# Patient Record
Sex: FEMALE | Race: WHITE | NOT HISPANIC OR LATINO | Employment: UNEMPLOYED | URBAN - METROPOLITAN AREA
[De-identification: names, ages, dates, MRNs, and addresses within clinical notes are randomized per-mention and may not be internally consistent; named-entity substitution may affect disease eponyms.]

---

## 2023-04-24 LAB
ALBUMIN (G/DL) IN SER/PLAS: 4.3 G/DL (ref 3.4–5)
ANION GAP IN SER/PLAS: 17 MMOL/L (ref 10–20)
CALCIUM (MG/DL) IN SER/PLAS: 8.6 MG/DL (ref 8.6–10.3)
CARBON DIOXIDE, TOTAL (MMOL/L) IN SER/PLAS: 25 MMOL/L (ref 21–32)
CHLORIDE (MMOL/L) IN SER/PLAS: 102 MMOL/L (ref 98–107)
CREATININE (MG/DL) IN SER/PLAS: 1.31 MG/DL (ref 0.5–1.05)
ERYTHROCYTE DISTRIBUTION WIDTH (RATIO) BY AUTOMATED COUNT: 16 % (ref 11.5–14.5)
ERYTHROCYTE MEAN CORPUSCULAR HEMOGLOBIN CONCENTRATION (G/DL) BY AUTOMATED: 29 G/DL (ref 32–36)
ERYTHROCYTE MEAN CORPUSCULAR VOLUME (FL) BY AUTOMATED COUNT: 75 FL (ref 80–100)
ERYTHROCYTES (10*6/UL) IN BLOOD BY AUTOMATED COUNT: 4.64 X10E12/L (ref 4–5.2)
GFR FEMALE: 39 ML/MIN/1.73M2
GLUCOSE (MG/DL) IN SER/PLAS: 100 MG/DL (ref 74–99)
HEMATOCRIT (%) IN BLOOD BY AUTOMATED COUNT: 34.8 % (ref 36–46)
HEMOGLOBIN (G/DL) IN BLOOD: 10.1 G/DL (ref 12–16)
LEUKOCYTES (10*3/UL) IN BLOOD BY AUTOMATED COUNT: 8.1 X10E9/L (ref 4.4–11.3)
PHOSPHATE (MG/DL) IN SER/PLAS: 4.1 MG/DL (ref 2.5–4.9)
PLATELETS (10*3/UL) IN BLOOD AUTOMATED COUNT: 334 X10E9/L (ref 150–450)
POTASSIUM (MMOL/L) IN SER/PLAS: 3.4 MMOL/L (ref 3.5–5.3)
SODIUM (MMOL/L) IN SER/PLAS: 141 MMOL/L (ref 136–145)
UREA NITROGEN (MG/DL) IN SER/PLAS: 28 MG/DL (ref 6–23)

## 2023-09-02 PROBLEM — I10 BENIGN ESSENTIAL HTN: Status: ACTIVE | Noted: 2023-09-02

## 2023-09-02 PROBLEM — I44.2 COMPLETE HEART BLOCK (MULTI): Status: ACTIVE | Noted: 2023-09-02

## 2023-09-02 PROBLEM — D50.9 IRON DEFICIENCY ANEMIA: Status: ACTIVE | Noted: 2023-09-02

## 2023-09-02 PROBLEM — J44.9 COPD (CHRONIC OBSTRUCTIVE PULMONARY DISEASE) (MULTI): Status: ACTIVE | Noted: 2023-09-02

## 2023-09-02 PROBLEM — K52.9 ACUTE COLITIS: Status: ACTIVE | Noted: 2023-09-02

## 2023-09-02 PROBLEM — R91.1 LUNG NODULE: Status: ACTIVE | Noted: 2023-09-02

## 2023-09-02 PROBLEM — N18.2 CHRONIC RENAL IMPAIRMENT, STAGE 2 (MILD): Status: ACTIVE | Noted: 2023-09-02

## 2023-09-02 PROBLEM — R07.9 CARDIAC CHEST PAIN: Status: ACTIVE | Noted: 2023-09-02

## 2023-09-02 PROBLEM — K92.2 GI (GASTROINTESTINAL BLEED): Status: ACTIVE | Noted: 2023-02-10

## 2023-09-02 PROBLEM — I50.22 CHRONIC SYSTOLIC CONGESTIVE HEART FAILURE (MULTI): Status: ACTIVE | Noted: 2023-09-02

## 2023-09-02 PROBLEM — R76.8 RED BLOOD CELL ANTIBODY POSITIVE: Chronic | Status: ACTIVE | Noted: 2023-02-21

## 2023-09-02 PROBLEM — I21.4 NON-ST ELEVATION MYOCARDIAL INFARCTION (NSTEMI), SUBSEQUENT EPISODE OF CARE (MULTI): Status: ACTIVE | Noted: 2023-09-02

## 2023-09-02 PROBLEM — F51.01 PRIMARY INSOMNIA: Status: ACTIVE | Noted: 2023-09-02

## 2023-09-02 PROBLEM — D37.8 NEOPLASM OF UNCERTAIN BEHAVIOR OF PANCREAS: Status: ACTIVE | Noted: 2023-09-02

## 2023-09-02 PROBLEM — I25.10 3-VESSEL CORONARY ARTERY DISEASE: Status: ACTIVE | Noted: 2023-09-02

## 2023-09-02 PROBLEM — I25.10 CORONARY ARTERY DISEASE INVOLVING NATIVE CORONARY ARTERY OF NATIVE HEART WITHOUT ANGINA PECTORIS: Status: ACTIVE | Noted: 2023-02-19

## 2023-09-02 PROBLEM — Z95.828 HISTORY OF ENDOVASCULAR STENT GRAFT FOR ABDOMINAL AORTIC ANEURYSM (AAA): Status: ACTIVE | Noted: 2023-09-02

## 2023-09-02 PROBLEM — Z90.410 ACQUIRED TOTAL ABSENCE OF PANCREAS: Status: ACTIVE | Noted: 2023-02-19

## 2023-09-02 PROBLEM — E11.9 DIABETES MELLITUS TYPE 2, NONINSULIN DEPENDENT (MULTI): Status: ACTIVE | Noted: 2023-09-02

## 2023-09-02 PROBLEM — I10 HTN (HYPERTENSION): Status: ACTIVE | Noted: 2023-09-02

## 2023-09-02 PROBLEM — I71.40 ABDOMINAL AORTIC ANEURYSM (AAA) (CMS-HCC): Status: ACTIVE | Noted: 2023-09-02

## 2023-09-02 PROBLEM — E78.00 HYPERCHOLESTEROLEMIA: Status: ACTIVE | Noted: 2023-09-02

## 2023-09-02 PROBLEM — K21.9 GERD WITHOUT ESOPHAGITIS: Status: ACTIVE | Noted: 2023-09-02

## 2023-09-02 PROBLEM — R63.4 WEIGHT LOSS: Status: ACTIVE | Noted: 2022-05-10

## 2023-09-02 RX ORDER — OMEPRAZOLE 40 MG/1
1 CAPSULE, DELAYED RELEASE ORAL
COMMUNITY
Start: 2021-10-24

## 2023-09-02 RX ORDER — AMLODIPINE BESYLATE 10 MG/1
1 TABLET ORAL DAILY
COMMUNITY
End: 2023-10-10 | Stop reason: ALTCHOICE

## 2023-09-02 RX ORDER — FAMOTIDINE 20 MG/1
1 TABLET, FILM COATED ORAL NIGHTLY
COMMUNITY

## 2023-09-02 RX ORDER — BUDESONIDE 0.5 MG/2ML
2 INHALANT ORAL 2 TIMES DAILY
COMMUNITY
Start: 2020-01-02 | End: 2023-10-10 | Stop reason: ALTCHOICE

## 2023-09-02 RX ORDER — FUROSEMIDE 40 MG/1
1 TABLET ORAL DAILY
COMMUNITY
End: 2023-11-22 | Stop reason: SDUPTHER

## 2023-09-02 RX ORDER — METFORMIN HYDROCHLORIDE 500 MG/1
1 TABLET ORAL EVERY 12 HOURS
COMMUNITY
Start: 2021-06-09

## 2023-09-02 RX ORDER — FLUTICASONE PROPIONATE 110 UG/1
2 AEROSOL, METERED RESPIRATORY (INHALATION) 2 TIMES DAILY
COMMUNITY
Start: 2020-01-10 | End: 2023-10-10 | Stop reason: ALTCHOICE

## 2023-09-02 RX ORDER — NITROGLYCERIN 0.4 MG/1
1 TABLET SUBLINGUAL EVERY 5 MIN PRN
COMMUNITY
Start: 2018-09-17

## 2023-09-02 RX ORDER — TRAZODONE HYDROCHLORIDE 50 MG/1
1 TABLET ORAL NIGHTLY
COMMUNITY
End: 2023-10-10 | Stop reason: ALTCHOICE

## 2023-09-02 RX ORDER — DOCUSATE SODIUM 100 MG/1
1 CAPSULE, LIQUID FILLED ORAL 2 TIMES DAILY
COMMUNITY
End: 2023-10-10 | Stop reason: ALTCHOICE

## 2023-09-02 RX ORDER — METOPROLOL TARTRATE 25 MG/1
1 TABLET, FILM COATED ORAL 2 TIMES DAILY
COMMUNITY
End: 2023-11-22

## 2023-09-02 RX ORDER — POTASSIUM CHLORIDE 750 MG/1
1 TABLET, FILM COATED, EXTENDED RELEASE ORAL DAILY
COMMUNITY
Start: 2021-11-26 | End: 2023-10-10 | Stop reason: ALTCHOICE

## 2023-09-02 RX ORDER — METOPROLOL TARTRATE 50 MG/1
50 TABLET ORAL 2 TIMES DAILY
COMMUNITY
Start: 2019-12-24 | End: 2023-10-10 | Stop reason: ALTCHOICE

## 2023-09-02 RX ORDER — SIMVASTATIN 5 MG/1
1 TABLET, FILM COATED ORAL NIGHTLY
COMMUNITY
Start: 2018-03-01 | End: 2023-10-10 | Stop reason: ALTCHOICE

## 2023-09-02 RX ORDER — ASPIRIN 81 MG/1
81 TABLET ORAL
COMMUNITY
End: 2023-10-10 | Stop reason: ALTCHOICE

## 2023-09-02 RX ORDER — PANTOPRAZOLE SODIUM 40 MG/1
1 TABLET, DELAYED RELEASE ORAL DAILY
COMMUNITY
End: 2023-10-10 | Stop reason: ALTCHOICE

## 2023-09-02 RX ORDER — ACETAMINOPHEN 500 MG
2 TABLET ORAL EVERY 6 HOURS PRN
COMMUNITY
Start: 2019-12-20 | End: 2023-10-10 | Stop reason: ALTCHOICE

## 2023-09-02 RX ORDER — EZETIMIBE 10 MG/1
1 TABLET ORAL DAILY
COMMUNITY
Start: 2022-07-19 | End: 2023-10-10 | Stop reason: ALTCHOICE

## 2023-09-02 RX ORDER — IRON POLYSACCHARIDE COMPLEX 150 MG
150 CAPSULE ORAL
COMMUNITY
End: 2023-10-10 | Stop reason: ALTCHOICE

## 2023-09-02 RX ORDER — SUCRALFATE 1 G/1
1 TABLET ORAL EVERY 6 HOURS
COMMUNITY
Start: 2022-07-19 | End: 2023-10-10 | Stop reason: ALTCHOICE

## 2023-09-02 RX ORDER — LISINOPRIL 2.5 MG/1
1 TABLET ORAL DAILY
COMMUNITY
Start: 2019-11-24 | End: 2023-11-22 | Stop reason: SDUPTHER

## 2023-09-02 RX ORDER — ACETAMINOPHEN 500 MG
1 TABLET ORAL NIGHTLY
COMMUNITY
End: 2023-10-10 | Stop reason: ALTCHOICE

## 2023-09-02 RX ORDER — GLIMEPIRIDE 1 MG/1
1 TABLET ORAL
COMMUNITY
Start: 2021-03-03 | End: 2023-10-10 | Stop reason: ALTCHOICE

## 2023-09-02 RX ORDER — HYDROXYZINE HYDROCHLORIDE 10 MG/1
1 TABLET, FILM COATED ORAL NIGHTLY
COMMUNITY
Start: 2021-11-09 | End: 2023-10-10 | Stop reason: ALTCHOICE

## 2023-09-02 RX ORDER — IPRATROPIUM BROMIDE 0.5 MG/2.5ML
2.5 SOLUTION RESPIRATORY (INHALATION) 4 TIMES DAILY
COMMUNITY
End: 2023-10-10 | Stop reason: ALTCHOICE

## 2023-09-02 RX ORDER — FERROUS SULFATE 15 MG/ML
15 DROPS ORAL 2 TIMES DAILY
COMMUNITY
Start: 2019-12-24 | End: 2023-10-10 | Stop reason: ALTCHOICE

## 2023-09-02 RX ORDER — SUCRALFATE 1 G/1
1 TABLET ORAL 4 TIMES DAILY
COMMUNITY
Start: 2023-05-10

## 2023-09-02 RX ORDER — TALC
1 POWDER (GRAM) TOPICAL NIGHTLY
COMMUNITY
End: 2023-10-10 | Stop reason: ALTCHOICE

## 2023-10-10 ENCOUNTER — ANCILLARY PROCEDURE (OUTPATIENT)
Dept: CARDIOLOGY | Facility: CLINIC | Age: 88
End: 2023-10-10
Payer: COMMERCIAL

## 2023-10-10 ENCOUNTER — OFFICE VISIT (OUTPATIENT)
Dept: CARDIOLOGY | Facility: CLINIC | Age: 88
End: 2023-10-10
Payer: COMMERCIAL

## 2023-10-10 VITALS
OXYGEN SATURATION: 96 % | WEIGHT: 95 LBS | BODY MASS INDEX: 17.94 KG/M2 | HEART RATE: 69 BPM | HEIGHT: 61 IN | DIASTOLIC BLOOD PRESSURE: 71 MMHG | SYSTOLIC BLOOD PRESSURE: 119 MMHG

## 2023-10-10 DIAGNOSIS — I10 PRIMARY HYPERTENSION: ICD-10-CM

## 2023-10-10 DIAGNOSIS — E78.00 HYPERCHOLESTEROLEMIA: ICD-10-CM

## 2023-10-10 DIAGNOSIS — I50.22 CHRONIC SYSTOLIC CONGESTIVE HEART FAILURE (MULTI): ICD-10-CM

## 2023-10-10 DIAGNOSIS — I25.10 CORONARY ARTERY DISEASE INVOLVING NATIVE CORONARY ARTERY OF NATIVE HEART WITHOUT ANGINA PECTORIS: ICD-10-CM

## 2023-10-10 DIAGNOSIS — I44.2 COMPLETE HEART BLOCK (MULTI): ICD-10-CM

## 2023-10-10 DIAGNOSIS — I10 BENIGN ESSENTIAL HTN: ICD-10-CM

## 2023-10-10 DIAGNOSIS — D50.0 IRON DEFICIENCY ANEMIA DUE TO CHRONIC BLOOD LOSS: ICD-10-CM

## 2023-10-10 DIAGNOSIS — I25.10 3-VESSEL CORONARY ARTERY DISEASE: Primary | ICD-10-CM

## 2023-10-10 DIAGNOSIS — R07.9 CARDIAC CHEST PAIN: ICD-10-CM

## 2023-10-10 LAB
ATRIAL RATE: 69 BPM
PR INTERVAL: 156 MS
Q ONSET: 196 MS
QRS COUNT: 11 BEATS
QRS DURATION: 132 MS
QT INTERVAL: 422 MS
QTC CALCULATION(BAZETT): 452 MS
QTC FREDERICIA: 442 MS
R AXIS: 12 DEGREES
T AXIS: 102 DEGREES
T OFFSET: 407 MS
VENTRICULAR RATE: 69 BPM

## 2023-10-10 PROCEDURE — 1036F TOBACCO NON-USER: CPT | Performed by: INTERNAL MEDICINE

## 2023-10-10 PROCEDURE — 3074F SYST BP LT 130 MM HG: CPT | Performed by: INTERNAL MEDICINE

## 2023-10-10 PROCEDURE — 1159F MED LIST DOCD IN RCRD: CPT | Performed by: INTERNAL MEDICINE

## 2023-10-10 PROCEDURE — 93005 ELECTROCARDIOGRAM TRACING: CPT

## 2023-10-10 PROCEDURE — 99215 OFFICE O/P EST HI 40 MIN: CPT | Performed by: INTERNAL MEDICINE

## 2023-10-10 PROCEDURE — 1126F AMNT PAIN NOTED NONE PRSNT: CPT | Performed by: INTERNAL MEDICINE

## 2023-10-10 PROCEDURE — 3078F DIAST BP <80 MM HG: CPT | Performed by: INTERNAL MEDICINE

## 2023-10-10 PROCEDURE — 1160F RVW MEDS BY RX/DR IN RCRD: CPT | Performed by: INTERNAL MEDICINE

## 2023-10-10 RX ORDER — ACETAMINOPHEN, DIPHENHYDRAMINE HCL, PHENYLEPHRINE HCL 325; 25; 5 MG/1; MG/1; MG/1
10 TABLET ORAL DAILY
COMMUNITY

## 2023-10-10 ASSESSMENT — PAIN SCALES - GENERAL: PAINLEVEL: 0-NO PAIN

## 2023-10-10 ASSESSMENT — ENCOUNTER SYMPTOMS
DEPRESSION: 0
OCCASIONAL FEELINGS OF UNSTEADINESS: 0
LOSS OF SENSATION IN FEET: 0

## 2023-10-10 NOTE — PROGRESS NOTES
"Chief Complaint:   Follow-up, Coronary Artery Disease, and Hypertension     History Of Present Illness:    Maci Catalan is a 88 y.o. female presenting with Ms Catalan is an 87-year-old female with pertinent medical history including, Hx of Perioperative NSTEMI ( Occuring in setting of EVAR 11/2019, Troponin Peak 18.45 ) three-vessel coronary artery disease S/P PCI (11/22/2019) to proximal LAD with a 2.75 x 12 mm Resolute [place] drug-eluting stent postdilated up to 3.5 mm ) Chronic Systolic Heart Failure with LVEF 35% ( 11/2019 ) with Mid and apical anterior septum, mid and apical inferior septum, and basal and mid inferior wall motion abnormalities, HX of AAA S/P EVAR 11/2019 ) Hx of Complete Heart Block S/P Medtronic Dual Chamber System, and Hx of Pancreatic Tumor S/P Whipple who is seen for follow up.     She is seen in the presence of her Sister and permission is granted to discuss medical care in her presnec.      Last Recorded Vitals:  Vitals:    10/10/23 1111   BP: 119/71   Pulse: 69   SpO2: 96%   Weight: (!) 43.1 kg (95 lb)   Height: 1.549 m (5' 1\")     Weight         10/10/2023  1111             Weight: 43.1 kg (95 lb)              Past Medical History:  She has a past medical history of Personal history of other diseases of the circulatory system and Personal history of other endocrine, nutritional and metabolic disease.    Past Surgical History:  She has a past surgical history that includes Hysterectomy (04/17/2018); Other surgical history (06/19/2018); Cholecystectomy (06/19/2018); Other surgical history (06/21/2019); Other surgical history (06/21/2019); CT angio abdomen pelvis w and or wo IV IV contrast (8/16/2019); CT angio aorta and bilateral iliofemoral runoff w and or wo IV contrast (11/7/2019); IR angiogram aorta abdomen (11/6/2019); and CT angio abdomen pelvis w and or wo IV IV contrast (10/16/2022).      Social History:  She reports that she has quit smoking. Her smoking use included cigarettes. " "She has never used smokeless tobacco. No history on file for alcohol use and drug use.    Family History:  Family History   Problem Relation Name Age of Onset    Hyperlipidemia Mother      Hyperlipidemia Father          Allergies:  Aspirin, Codeine, and Hydrocortisone    Outpatient Medications:  Current Outpatient Medications   Medication Instructions    famotidine (Pepcid) 20 mg tablet 1 tablet, oral, Nightly    furosemide (Lasix) 40 mg tablet 1 tablet, oral, Daily    lisinopril 2.5 mg tablet 1 tablet, oral, Daily    melatonin 10 mg, oral, Daily    metFORMIN (Glucophage) 500 mg tablet 1 tablet, oral, Every 12 hours, With food     metoprolol tartrate (Lopressor) 25 mg tablet 1 tablet, oral, 2 times daily    nitroglycerin (Nitrostat) 0.4 mg SL tablet 1 tablet, sublingual, Every 5 min PRN    omeprazole (PriLOSEC) 40 mg DR capsule 1 capsule, oral, Daily    sucralfate (Carafate) 1 gram tablet 1 tablet, oral, 4 times daily       Physical Exam:  /71   Pulse 69   Ht 1.549 m (5' 1\")   Wt (!) 43.1 kg (95 lb)   SpO2 96%   BMI 17.95 kg/m²     General Appearance:  Alert, cooperative, no distress, appears stated age   Head:  Normocephalic, without obvious abnormality, atraumatic   Eyes:  PERRL, conjunctiva/corneas clear, EOM's intact, fundi benign, both eyes   Ears:  Normal TM's and external ear canals, both ears   Nose: Nares normal, septum midline,mucosa normal, no drainage or sinus tenderness   Throat: Lips, mucosa, and tongue normal; teeth and gums normal   Neck: Supple, symmetrical, trachea midline, no adenopathy;  thyroid: not enlarged, symmetric, no tenderness/mass/nodules; no carotid bruit or JVD   Back:   Symmetric, no curvature, ROM normal, no CVA tenderness   Lungs:   Clear to auscultation bilaterally, respirations unlabored   Breasts:  No masses or tenderness   Heart:  Regular rate and rhythm, S1 and S2 normal, no murmur, rub, or gallop   Abdomen:   Soft, non-tender, bowel sounds active all four quadrants, "  no masses, no organomegaly   Pelvic: Deferred   Extremities: Extremities normal, atraumatic, no cyanosis or edema   Pulses: 2+ and symmetric   Skin: Skin color, texture, turgor normal, no rashes or lesions   Lymph nodes: Cervical, supraclavicular, and axillary nodes normal   Neurologic: Normal          Last Labs:  CBC -  Lab Results   Component Value Date    WBC 8.1 04/24/2023    HGB 10.1 (L) 04/24/2023    HCT 34.8 (L) 04/24/2023    MCV 75 (L) 04/24/2023     04/24/2023       CMP -  Lab Results   Component Value Date    CALCIUM 8.6 04/24/2023    PHOS 4.1 04/24/2023    PROT 6.3 (L) 02/06/2023    ALBUMIN 4.3 04/24/2023    AST 14 02/06/2023    ALT 13 02/06/2023    ALKPHOS 35 02/06/2023    BILITOT 0.3 02/06/2023       LIPID PANEL -   Lab Results   Component Value Date    CHOL 143 11/01/2022    TRIG 209 (H) 11/01/2022    HDL 55.0 11/01/2022    CHHDL 2.6 11/01/2022    LDLF 126 (H) 11/10/2021    VLDL 38 11/10/2021    NHDL 143 11/11/2019       RENAL FUNCTION PANEL -   Lab Results   Component Value Date    GLUCOSE 100 (H) 04/24/2023     04/24/2023    K 3.4 (L) 04/24/2023     04/24/2023    CO2 25 04/24/2023    ANIONGAP 17 04/24/2023    BUN 28 (H) 04/24/2023    CREATININE 1.31 (H) 04/24/2023    CALCIUM 8.6 04/24/2023    PHOS 4.1 04/24/2023    ALBUMIN 4.3 04/24/2023        Lab Results   Component Value Date     (H) 11/01/2022    HGBA1C 7.4 (A) 06/14/2022    HGBA1C 7.4 (H) 06/01/2021       Last Cardiology Tests:  ECG:  In Office EKG 10/10/2023  -- AV Pacing   Echo:  Echocardiogram 01/2021  1. The left ventricular systolic function is normal with a 55% estimated ejection fraction.  2. Mid and apical inferior septum and basal inferior segment are abnormal.This is unchanged.  3. Moderately increased left ventricular septal thickness.  4. There is an elevated mean left atrial pressure.  5. Slightly elevated RVSP.  6. The left atrium is abnormally small.  7. The left ventricular cavity size is decreased      Cath:  Cardiac catheterization and PCI 2019  A 4 Arabic pigtail was used to cross the aortic valve for LV pressure  measurement and pullback.     Coronary Angiography:  The coronary circulation is right dominant.     Left Main Coronary Artery:  The left main coronary artery is a normal caliber vessel. The left main arises  normally from the left coronary sinus of Valsalva and bifurcates into the LAD  and circumflex coronary arteries. The left main coronary artery showed  calcification. The distal left main coronary artery showed 10 to 30% stenosis.     Left Anterior Descending Coronary Artery Distribution:  The left anterior descending coronary artery is a normal caliber vessel. The LAD  arises normally from the left main coronary artery. The LAD demonstrated  calcification. The proximal left anterior descending coronary artery showed 40%  stenosis. An additional lesion, located at the proximal left anterior descending  coronary artery, revealed 90% stenosis. The 1st diagonal branch is a normal  caliber vessel. The proximal 1st diagonal branch revealed 80% stenosis. The 2nd  diagonal branch is a small caliber vessel. The proximal 2nd diagonal branch  showed 70% stenosis.        Valve Findings:  No aortic valve stenosis is visualized.     Coronary Interventions:  Angiography reveals a 90% stenosis of the proximal left anterior descending.  Pre-intervention LIZANDRO flow was 3. Percutaneous coronary intervention was  performed within the proximal left anterior descending. The stenosis was  successfully reduced from 90% to 0%. Post-intervention LIZANDRO flow was 3.  Successful PCI of the proximal LAD with a 2.75 x 12 mm Resolute [place]  drug-eluting stent postdilated up to 3.5 mm.     Coronary Intervention Comments:  An Ikari L 3.5 guide was used to engage the left main. A run-through wire was  placed in the distal LAD. A Akira Blue wire was attempted to be placed in the  first diagonal. Due to vessel angulation wire could not  be placed there. A 2.0 x  12 mm semi-compliant balloon would not cross the proximal LAD. Subsequently a  guide liner was used to assist in delivering equipment. The lesion was  predilated with a 2.0 x 12 mm semi-compliant balloon at 8 guillermo. The guide liner  was advanced into the LAD while the balloon was inflated. The stent was then  delivered across the lesion. A 2.75 x 12 mm resolute case drug-eluting stent was  then deployed at 10 guillermo. A 3.25 x 8 mm noncompliant balloon would not cross the  mid stent. Subsequently the Akira Blue wire was placed in the distal LAD to be  used as a delia wire. The proximal portion of the stent was postdilated with a  3.25 x 8 mm noncompliant balloon up to 16 guillermo. A 3.25 x 6 mm semi-compliant  balloon was used to post dilate the distal stent up to 14 guillermo. A 3.5 x 6 mm  noncompliant balloon was then used to post dilate the entire stented up to 16  guillermo. Final angiography revealed LIZANDRO 3 flow with 0% residual stenosis. The  jailed diagonal had LIZANDRO 3 flow with moderate to severe ostial disease.     Coronary Lesion Summary:  Vessel Stenosis Vessel Segment  Left Main 10 to 30% stenosis distal  LAD 40% stenosis proximal  LAD 90% stenosis proximal  1st Diagonal 80% stenosis proximal  2nd Diagonal 70% stenosis proximal      Lab review: I have Chemistry CMP:   Lab Results   Component Value Date    ALBUMIN 4.3 04/24/2023    CALCIUM 8.6 04/24/2023    CO2 25 04/24/2023    CREATININE 1.31 (H) 04/24/2023    GLUCOSE 100 (H) 04/24/2023    BILITOT 0.3 02/06/2023    PROT 6.3 (L) 02/06/2023    ALT 13 02/06/2023    AST 14 02/06/2023    ALKPHOS 35 02/06/2023   , Chemistry BMP   Lab Results   Component Value Date    GLUCOSE 100 (H) 04/24/2023    CALCIUM 8.6 04/24/2023    CO2 25 04/24/2023    CREATININE 1.31 (H) 04/24/2023   , CBC:  Lab Results   Component Value Date    WBC 8.1 04/24/2023    RBC 4.64 04/24/2023    HGB 10.1 (L) 04/24/2023    HCT 34.8 (L) 04/24/2023    MCV 75 (L) 04/24/2023    MCHC 29.0 (L)  "04/24/2023    RDW 16.0 (H) 04/24/2023    NRBC 0.0 11/01/2022   , Coags:   Lab Results   Component Value Date    APTT 25 (L) 02/06/2023    FIBRINOGEN 429 (H) 11/08/2019    INR 1.0 02/06/2023   , Lipids:   Lab Results   Component Value Date    CHOL 143 11/01/2022    HDL 55.0 11/01/2022    TRIG 209 (H) 11/01/2022   , Cardiac Enzymes: No results found for: \"CKTOTAL\", \"CKMB\", \"CKMBINDEX\", \"TROPONINT\", and POC Glucose:   Lab Results   Component Value Date    GLUCOSE 100 (H) 04/24/2023     Diagnostic review: I have personally reviewed the result(s) of the EKG and Echocardiogram .   Imaging review: I have  personally reviewed the result(s) Device Interrogation     Assessment/Plan   Problem List Items Addressed This Visit             ICD-10-CM    Benign essential HTN I10    3-vessel coronary artery disease - Primary I25.10    Relevant Orders    Iron and TIBC    Ferritin    CBC and Auto Differential    Troponin I, High Sensitivity    B-Type Natriuretic Peptide    Coronary artery disease involving native coronary artery of native heart without angina pectoris I25.10    Cardiac chest pain R07.9    Relevant Orders    ECG 12 lead (Ancillary Performed)    Chronic systolic congestive heart failure (CMS/HCC) I50.22    Relevant Orders    Iron and TIBC    CBC and Auto Differential    Comprehensive metabolic panel    B-Type Natriuretic Peptide    Complete heart block (CMS/HCC) I44.2    Relevant Orders    ECG 12 lead (Ancillary Performed)    Referral to Cardiac Electrophysiology    HTN (hypertension) I10    Hypercholesterolemia E78.00    Relevant Orders    Lipid Panel    Iron deficiency anemia D50.9    Relevant Orders    Iron and TIBC    Ferritin    CBC and Auto Differential     Overall, she continues to do well from a cardiovascular standpoint.  She offers no significant complaints.  Her concern is regarding her permanent pacemaker and monitoring of this.  Device was interrogated at bedside with results transmitted to [employer] system " via [employer].  They are currently in process.  We will set her up with electrophysiology for device management and continued monitoring.  We will place referral to our electrophysiology colleague, Dr. PERRI Bush so that she can continue to consolidate care at the [facility], [place]

## 2023-10-10 NOTE — PATIENT INSTRUCTIONS
It was a pleasure seeing you today. I would like to see you back in clinic in  4-6  months. You can call my office if questions arise between now and our next visit.     Today we talked about the following:   -- You Cardiac Device and Battery  ++ Please meet with Dr Bush here at [place] so that they can follow your pacemaker and the battery   += Right now, you're battery should have more than enough life. WE did interrogate this and it is transmitting to the company to verify this     We will stay on your current medications   -- I have placed orders for blood work.   -- get blood work about 1-2 weeks before we see each other again.         Below are some Heart Health Tips that we provide to all of our patients. I hope you find them useful.     -  We recommend you follow a heart healthy diet. Watch food labels and try not to eat more than 2,500 mg of sodium per day. Avoid foods high in salt like processed meats (lunch meats, elias, and sausage), processed foods (boxed dinners, canned soups), fried and fast foods. Monitor serving sizes and if the sodium per serving size is more than 200 mg, avoid those foods. If the sodium per serving size is between 100-200 mg, you can use those in limited quantities. Try to choose foods where the amount of sodium per serving size is less than 100 mg. Try to eat a diet rich in fruits and vegetables, whole grains, low fat dairy products, skinless poultry and fish, nuts, beans, non-tropical vegetable oils. Limit saturated fat, trans fat, sodium, red meats, and sugar-sweetened beverages.   Limit alcohol     -The combination of a reduced-calorie diet and increased physical activity is recommended. Adults should aim to get at least 150 minutes of moderate physical activity per week (30 minutes of moderate physical activities at least 5 days per week). Examples of moderate physical activities include brisk walking, swimming, aerobic dancing, heavy gardening, jumping rope, bicycling 10  "MPH or faster, tennis, hiking uphill or with a heavy backpack. Please let us know if you would like to learn more about your nutrition and calories and additional options including weight loss programs to help you reach your goal.     -If you smoke, stop smoking. If you stop smoking you can help get rid of a major source of stress to your heart. Smoking makes your heart rate and blood pressure go up and increases your risk or developing cardiovascular diseases and worsen symptoms associated with heart failure.     -Obtain a BP monitor and monitor your BP daily. Check it around the same time each day; at least 1 hour after taking your medications. Record your BP in a log and bring your log with you to your doctors appointment.     -F/u with your PCP as recommended.     - If you are having problems with medications, consider looking at the following websites.   --  \"GoodRx\"   --  \"[facility]\"    - We are happy to supply written prescriptions if needed to allow you to obtain your medications from different pharmacies. Additionally, if you are having issues with mail order delivery, please let us know. We can send a limited supply of your medications to your local pharmacy.     "

## 2023-11-15 DIAGNOSIS — I25.10 3-VESSEL CORONARY ARTERY DISEASE: Primary | ICD-10-CM

## 2023-11-22 RX ORDER — FUROSEMIDE 40 MG/1
40 TABLET ORAL DAILY
Qty: 100 TABLET | Refills: 2 | Status: SHIPPED | OUTPATIENT
Start: 2023-11-22 | End: 2024-04-10

## 2023-11-22 RX ORDER — METOPROLOL TARTRATE 25 MG/1
25 TABLET, FILM COATED ORAL 2 TIMES DAILY
Qty: 200 TABLET | Refills: 2 | Status: SHIPPED | OUTPATIENT
Start: 2023-11-22 | End: 2024-04-10 | Stop reason: SDUPTHER

## 2023-11-22 RX ORDER — LISINOPRIL 2.5 MG/1
2.5 TABLET ORAL DAILY
Qty: 100 TABLET | Refills: 2 | Status: SHIPPED | OUTPATIENT
Start: 2023-11-22 | End: 2024-04-10 | Stop reason: SDUPTHER

## 2023-11-23 NOTE — TELEPHONE ENCOUNTER
Cardiac Medications Refilled   -- As per 10/10/2023 note  -- Metoprolol Tartrate 25 mg BID   -- Lisinopril 2.5 mg   '-- Furosemide 40 mg

## 2024-01-23 ENCOUNTER — APPOINTMENT (OUTPATIENT)
Dept: CARDIOLOGY | Facility: CLINIC | Age: 89
End: 2024-01-23
Payer: MEDICARE

## 2024-03-25 ENCOUNTER — LAB (OUTPATIENT)
Dept: LAB | Facility: LAB | Age: 89
End: 2024-03-25
Payer: MEDICARE

## 2024-03-25 DIAGNOSIS — K55.019: Primary | ICD-10-CM

## 2024-03-25 DIAGNOSIS — I50.22 CHRONIC SYSTOLIC CONGESTIVE HEART FAILURE (MULTI): ICD-10-CM

## 2024-03-25 DIAGNOSIS — E11.9 TYPE 2 DIABETES MELLITUS WITHOUT COMPLICATIONS (MULTI): ICD-10-CM

## 2024-03-25 DIAGNOSIS — D50.0 IRON DEFICIENCY ANEMIA DUE TO CHRONIC BLOOD LOSS: ICD-10-CM

## 2024-03-25 DIAGNOSIS — E78.00 HYPERCHOLESTEROLEMIA: ICD-10-CM

## 2024-03-25 DIAGNOSIS — I25.10 3-VESSEL CORONARY ARTERY DISEASE: ICD-10-CM

## 2024-03-25 LAB
ALBUMIN SERPL BCP-MCNC: 4.2 G/DL (ref 3.4–5)
ALP SERPL-CCNC: 87 U/L (ref 33–136)
ALT SERPL W P-5'-P-CCNC: 11 U/L (ref 7–45)
ANION GAP SERPL CALC-SCNC: 15 MMOL/L (ref 10–20)
AST SERPL W P-5'-P-CCNC: 14 U/L (ref 9–39)
BASOPHILS # BLD AUTO: 0.07 X10*3/UL (ref 0–0.1)
BASOPHILS NFR BLD AUTO: 1.1 %
BILIRUB DIRECT SERPL-MCNC: 0.1 MG/DL (ref 0–0.3)
BILIRUB SERPL-MCNC: 0.3 MG/DL (ref 0–1.2)
BNP SERPL-MCNC: 296 PG/ML (ref 0–99)
BUN SERPL-MCNC: 28 MG/DL (ref 6–23)
CALCIUM SERPL-MCNC: 8.6 MG/DL (ref 8.6–10.3)
CARDIAC TROPONIN I PNL SERPL HS: 21 NG/L (ref 0–13)
CHLORIDE SERPL-SCNC: 100 MMOL/L (ref 98–107)
CHOLEST SERPL-MCNC: 235 MG/DL (ref 0–199)
CHOLESTEROL/HDL RATIO: 3.7
CO2 SERPL-SCNC: 25 MMOL/L (ref 21–32)
CREAT SERPL-MCNC: 1.98 MG/DL (ref 0.5–1.05)
EGFRCR SERPLBLD CKD-EPI 2021: 24 ML/MIN/1.73M*2
EOSINOPHIL # BLD AUTO: 0.24 X10*3/UL (ref 0–0.4)
EOSINOPHIL NFR BLD AUTO: 3.8 %
ERYTHROCYTE [DISTWIDTH] IN BLOOD BY AUTOMATED COUNT: 16.3 % (ref 11.5–14.5)
EST. AVERAGE GLUCOSE BLD GHB EST-MCNC: 189 MG/DL
FERRITIN SERPL-MCNC: 17 NG/ML (ref 8–150)
GLUCOSE SERPL-MCNC: 245 MG/DL (ref 74–99)
HBA1C MFR BLD: 8.2 %
HCT VFR BLD AUTO: 36.7 % (ref 36–46)
HDLC SERPL-MCNC: 62.8 MG/DL
HGB BLD-MCNC: 10.7 G/DL (ref 12–16)
IMM GRANULOCYTES # BLD AUTO: 0.02 X10*3/UL (ref 0–0.5)
IMM GRANULOCYTES NFR BLD AUTO: 0.3 % (ref 0–0.9)
IRON SATN MFR SERPL: 9 % (ref 25–45)
IRON SERPL-MCNC: 43 UG/DL (ref 35–150)
LDLC SERPL CALC-MCNC: 125 MG/DL
LYMPHOCYTES # BLD AUTO: 0.93 X10*3/UL (ref 0.8–3)
LYMPHOCYTES NFR BLD AUTO: 14.7 %
MCH RBC QN AUTO: 22.7 PG (ref 26–34)
MCHC RBC AUTO-ENTMCNC: 29.2 G/DL (ref 32–36)
MCV RBC AUTO: 78 FL (ref 80–100)
MONOCYTES # BLD AUTO: 0.4 X10*3/UL (ref 0.05–0.8)
MONOCYTES NFR BLD AUTO: 6.3 %
NEUTROPHILS # BLD AUTO: 4.68 X10*3/UL (ref 1.6–5.5)
NEUTROPHILS NFR BLD AUTO: 73.8 %
NON HDL CHOLESTEROL: 172 MG/DL (ref 0–149)
NRBC BLD-RTO: 0 /100 WBCS (ref 0–0)
PLATELET # BLD AUTO: 203 X10*3/UL (ref 150–450)
POTASSIUM SERPL-SCNC: 4.5 MMOL/L (ref 3.5–5.3)
PROT SERPL-MCNC: 7.1 G/DL (ref 6.4–8.2)
RBC # BLD AUTO: 4.72 X10*6/UL (ref 4–5.2)
SODIUM SERPL-SCNC: 135 MMOL/L (ref 136–145)
TIBC SERPL-MCNC: 467 UG/DL (ref 240–445)
TRIGL SERPL-MCNC: 235 MG/DL (ref 0–149)
UIBC SERPL-MCNC: 424 UG/DL (ref 110–370)
VLDL: 47 MG/DL (ref 0–40)
WBC # BLD AUTO: 6.3 X10*3/UL (ref 4.4–11.3)

## 2024-03-25 PROCEDURE — 83550 IRON BINDING TEST: CPT

## 2024-03-25 PROCEDURE — 80061 LIPID PANEL: CPT

## 2024-03-25 PROCEDURE — 82248 BILIRUBIN DIRECT: CPT

## 2024-03-25 PROCEDURE — 84484 ASSAY OF TROPONIN QUANT: CPT

## 2024-03-25 PROCEDURE — 82570 ASSAY OF URINE CREATININE: CPT

## 2024-03-25 PROCEDURE — 83880 ASSAY OF NATRIURETIC PEPTIDE: CPT

## 2024-03-25 PROCEDURE — 36415 COLL VENOUS BLD VENIPUNCTURE: CPT

## 2024-03-25 PROCEDURE — 83540 ASSAY OF IRON: CPT

## 2024-03-25 PROCEDURE — 85025 COMPLETE CBC W/AUTO DIFF WBC: CPT

## 2024-03-25 PROCEDURE — 80053 COMPREHEN METABOLIC PANEL: CPT

## 2024-03-25 PROCEDURE — 82728 ASSAY OF FERRITIN: CPT

## 2024-03-25 PROCEDURE — 82043 UR ALBUMIN QUANTITATIVE: CPT

## 2024-03-25 PROCEDURE — 83036 HEMOGLOBIN GLYCOSYLATED A1C: CPT

## 2024-03-26 LAB
CREAT UR-MCNC: 19.2 MG/DL (ref 20–320)
MICROALBUMIN UR-MCNC: 11.5 MG/L
MICROALBUMIN/CREAT UR: 59.9 UG/MG CREAT

## 2024-04-09 ENCOUNTER — OFFICE VISIT (OUTPATIENT)
Dept: CARDIOLOGY | Facility: CLINIC | Age: 89
End: 2024-04-09
Payer: MEDICARE

## 2024-04-09 VITALS
SYSTOLIC BLOOD PRESSURE: 142 MMHG | HEART RATE: 79 BPM | WEIGHT: 103 LBS | BODY MASS INDEX: 20.22 KG/M2 | HEIGHT: 60 IN | OXYGEN SATURATION: 96 % | DIASTOLIC BLOOD PRESSURE: 70 MMHG

## 2024-04-09 DIAGNOSIS — I10 BENIGN ESSENTIAL HTN: ICD-10-CM

## 2024-04-09 DIAGNOSIS — I50.22 CHRONIC SYSTOLIC CONGESTIVE HEART FAILURE (MULTI): ICD-10-CM

## 2024-04-09 DIAGNOSIS — I25.10 3-VESSEL CORONARY ARTERY DISEASE: Primary | ICD-10-CM

## 2024-04-09 DIAGNOSIS — Z95.828 HISTORY OF ENDOVASCULAR STENT GRAFT FOR ABDOMINAL AORTIC ANEURYSM (AAA): ICD-10-CM

## 2024-04-09 DIAGNOSIS — I71.43 INFRARENAL ABDOMINAL AORTIC ANEURYSM (AAA) WITHOUT RUPTURE (CMS-HCC): ICD-10-CM

## 2024-04-09 PROCEDURE — 99214 OFFICE O/P EST MOD 30 MIN: CPT | Mod: 25 | Performed by: INTERNAL MEDICINE

## 2024-04-09 PROCEDURE — 1159F MED LIST DOCD IN RCRD: CPT | Performed by: INTERNAL MEDICINE

## 2024-04-09 PROCEDURE — 3078F DIAST BP <80 MM HG: CPT | Performed by: INTERNAL MEDICINE

## 2024-04-09 PROCEDURE — 1160F RVW MEDS BY RX/DR IN RCRD: CPT | Performed by: INTERNAL MEDICINE

## 2024-04-09 PROCEDURE — 3077F SYST BP >= 140 MM HG: CPT | Performed by: INTERNAL MEDICINE

## 2024-04-09 PROCEDURE — 1126F AMNT PAIN NOTED NONE PRSNT: CPT | Performed by: INTERNAL MEDICINE

## 2024-04-09 PROCEDURE — 93010 ELECTROCARDIOGRAM REPORT: CPT | Performed by: INTERNAL MEDICINE

## 2024-04-09 PROCEDURE — 99214 OFFICE O/P EST MOD 30 MIN: CPT | Performed by: INTERNAL MEDICINE

## 2024-04-09 ASSESSMENT — PAIN SCALES - GENERAL: PAINLEVEL: 0-NO PAIN

## 2024-04-09 ASSESSMENT — ENCOUNTER SYMPTOMS
OCCASIONAL FEELINGS OF UNSTEADINESS: 0
LOSS OF SENSATION IN FEET: 0
DEPRESSION: 0

## 2024-04-09 NOTE — PATIENT INSTRUCTIONS
It was a pleasure seeing you today. I would like to see you back in clinic in  4-6  months. You can call my office if questions arise between now and our next visit.     Today we talked about the following:   -- Cholesterol -- Watch what you are eating and try to exercise just a touc more   -- Try dancing with the dog, line dancing or Neftaly Barrios Sweating to the oldies.     We will stay on your current medications     Below are some Heart Health Tips that we provide to all of our patients. I hope you find them useful.     -  We recommend you follow a heart healthy diet. Watch food labels and try not to eat more than 2,500 mg of sodium per day. Avoid foods high in salt like processed meats (lunch meats, elias, and sausage), processed foods (boxed dinners, canned soups), fried and fast foods. Monitor serving sizes and if the sodium per serving size is more than 200 mg, avoid those foods. If the sodium per serving size is between 100-200 mg, you can use those in limited quantities. Try to choose foods where the amount of sodium per serving size is less than 100 mg. Try to eat a diet rich in fruits and vegetables, whole grains, low fat dairy products, skinless poultry and fish, nuts, beans, non-tropical vegetable oils. Limit saturated fat, trans fat, sodium, red meats, and sugar-sweetened beverages.   Limit alcohol     -The combination of a reduced-calorie diet and increased physical activity is recommended. Adults should aim to get at least 150 minutes of moderate physical activity per week (30 minutes of moderate physical activities at least 5 days per week). Examples of moderate physical activities include brisk walking, swimming, aerobic dancing, heavy gardening, jumping rope, bicycling 10 MPH or faster, tennis, hiking uphill or with a heavy backpack. Please let us know if you would like to learn more about your nutrition and calories and additional options including weight loss programs to help you reach your  "goal.     -If you smoke, stop smoking. If you stop smoking you can help get rid of a major source of stress to your heart. Smoking makes your heart rate and blood pressure go up and increases your risk or developing cardiovascular diseases and worsen symptoms associated with heart failure.     -Obtain a BP monitor and monitor your BP daily. Check it around the same time each day; at least 1 hour after taking your medications. Record your BP in a log and bring your log with you to your doctors appointment.     -F/u with your PCP as recommended.     - If you are having problems with medications, consider looking at the following websites.   --  \"GoodRx\"   --  \"[facility]\"    - We are happy to supply written prescriptions if needed to allow you to obtain your medications from different pharmacies. Additionally, if you are having issues with mail order delivery, please let us know. We can send a limited supply of your medications to your local pharmacy.   "

## 2024-04-09 NOTE — PROGRESS NOTES
Chief Complaint:   Follow-up (7 month )     History Of Present Illness:    Maci Catalan is a 88 y.o. female presenting with Ms Catalan is an 87-year-old female with pertinent medical history including, Hx of Perioperative NSTEMI ( Occuring in setting of EVAR 11/2019, Troponin Peak 18.45 ) three-vessel coronary artery disease S/P PCI (11/22/2019) to proximal LAD with a 2.75 x 12 mm Resolute Rc drug-eluting stent postdilated up to 3.5 mm ) Chronic Systolic Heart Failure with LVEF 35% ( 11/2019 ) with Mid and apical anterior septum, mid and apical inferior septum, and basal and mid inferior wall motion abnormalities, HX of AAA S/P EVAR 11/2019 ) Hx of Complete Heart Block S/P Medtronic Dual Chamber System, and Hx of Pancreatic Tumor S/P Whipple who is seen for follow up.     She is seen in the presence of her Sister and permission is granted to discuss medical care in her in her presence.  Since her last appointment, they have both done very well.  She continues to remain as active as she can.  She has put on some weight, but this is mostly desirable weight gain issues improve nutrition.  She has not noticed any lower extremity edema, abdominal swelling.  She notes no orthopnea, paroxysmal nocturnal dyspnea.  She remains compliant with medications.  She offers no cardiovascular complaints.    Patient denies chest pain and angina.  Pt denies shortness of breath, dyspnea on exertion, orthopnea, and paroxysmal nocturnal dyspnea.  Pt denies worsening lower extremity edema.  Pt denies palpitations or syncope.  No recent falls.  No fever or chills.  No cough.  No change in bowel or bladder habits.  No travel.  No sick contacts.  No recent travel    12 point review of systems was performed and is otherwise negative.  Past medical history:  As above.  Medications:  Reviewed.  Allergies:  Reviewed.  Social history:  Patient denies smoking, alcohol abuse, or illicit drug use.  Family history:  No sudden cardiac death or  premature coronary artery disease.  67     Last Recorded Vitals:  Vitals:    04/09/24 1113 04/09/24 1145   BP: 152/78 142/70   BP Location:  Left arm   Patient Position: Sitting Sitting   BP Cuff Size:  Adult   Pulse: 79    SpO2: 96%    Weight: 46.7 kg (103 lb)    Height: 1.524 m (5')      Weight         4/9/2024  1113             Weight: 46.7 kg (103 lb)              Past Medical History:  She has a past medical history of Personal history of other diseases of the circulatory system and Personal history of other endocrine, nutritional and metabolic disease.    Past Surgical History:  She has a past surgical history that includes Hysterectomy (04/17/2018); Other surgical history (06/19/2018); Cholecystectomy (06/19/2018); Other surgical history (06/21/2019); Other surgical history (06/21/2019); CT angio abdomen pelvis w and or wo IV IV contrast (8/16/2019); CT angio aorta and bilateral iliofemoral runoff w and or wo IV contrast (11/7/2019); IR angiogram aorta abdomen (11/6/2019); and CT angio abdomen pelvis w and or wo IV IV contrast (10/16/2022).      Social History:  She reports that she quit smoking about 7 years ago. Her smoking use included cigarettes. She has been exposed to tobacco smoke. She has never used smokeless tobacco. She reports that she does not drink alcohol and does not use drugs.    Family History:  Family History   Problem Relation Name Age of Onset    Hyperlipidemia Mother      Hyperlipidemia Father          Allergies:  Aspirin, Codeine, and Hydrocortisone    Outpatient Medications:  Current Outpatient Medications   Medication Instructions    famotidine (Pepcid) 20 mg tablet 1 tablet, oral, Nightly    furosemide (LASIX) 40 mg, oral, Daily    lisinopril 2.5 mg, oral, Daily    melatonin 10 mg, oral, Daily    metFORMIN (Glucophage) 500 mg tablet 1 tablet, oral, Every 12 hours, With food     metoprolol tartrate (LOPRESSOR) 25 mg, oral, 2 times daily    nitroglycerin (Nitrostat) 0.4 mg SL tablet 1  tablet, sublingual, Every 5 min PRN    omeprazole (PriLOSEC) 40 mg DR capsule 1 capsule, oral, 2 times daily (0900,1400)    sucralfate (Carafate) 1 gram tablet 1 tablet, oral, 4 times daily       Physical Exam:  /70 (BP Location: Left arm, Patient Position: Sitting, BP Cuff Size: Adult)   Pulse 79   Ht 1.524 m (5')   Wt 46.7 kg (103 lb)   SpO2 96%   BMI 20.12 kg/m²     General Appearance:  Alert, cooperative, no distress, appears stated age   Head:  Normocephalic, without obvious abnormality, atraumatic   Eyes:  PERRL, conjunctiva/corneas clear, EOM's intact, fundi benign, both eyes   Ears:  Normal TM's and external ear canals, both ears   Nose: Nares normal, septum midline,mucosa normal, no drainage or sinus tenderness   Throat: Lips, mucosa, and tongue normal; teeth and gums normal   Neck: Supple, symmetrical, trachea midline, no adenopathy;  thyroid: not enlarged, symmetric, no tenderness/mass/nodules; no carotid bruit or JVD   Back:   Symmetric, no curvature, ROM normal, no CVA tenderness   Lungs:   Clear to auscultation bilaterally, respirations unlabored   Breasts:  No masses or tenderness   Heart:  Regular rate and rhythm, S1 and S2 normal, no murmur, rub, or gallop   Abdomen:   Soft, non-tender, bowel sounds active all four quadrants,  no masses, no organomegaly   Pelvic: Deferred   Extremities: Extremities normal, atraumatic, no cyanosis or edema   Pulses: 2+ and symmetric   Skin: Skin color, texture, turgor normal, no rashes or lesions   Lymph nodes: Cervical, supraclavicular, and axillary nodes normal   Neurologic: Normal          Last Labs:  CBC -  Lab Results   Component Value Date    WBC 6.3 03/25/2024    HGB 10.7 (L) 03/25/2024    HCT 36.7 03/25/2024    MCV 78 (L) 03/25/2024     03/25/2024       CMP -  Lab Results   Component Value Date    CALCIUM 8.6 03/25/2024    PHOS 4.1 04/24/2023    PROT 7.1 03/25/2024    ALBUMIN 4.2 03/25/2024    AST 14 03/25/2024    ALT 11 03/25/2024     ALKPHOS 87 03/25/2024    BILITOT 0.3 03/25/2024       LIPID PANEL -   Lab Results   Component Value Date    CHOL 235 (H) 03/25/2024    TRIG 235 (H) 03/25/2024    HDL 62.8 03/25/2024    CHHDL 3.7 03/25/2024    LDLF 126 (H) 11/10/2021    VLDL 47 (H) 03/25/2024    NHDL 172 (H) 03/25/2024       RENAL FUNCTION PANEL -   Lab Results   Component Value Date    GLUCOSE 245 (H) 03/25/2024     (L) 03/25/2024    K 4.5 03/25/2024     03/25/2024    CO2 25 03/25/2024    ANIONGAP 15 03/25/2024    BUN 28 (H) 03/25/2024    CREATININE 1.98 (H) 03/25/2024    CALCIUM 8.6 03/25/2024    PHOS 4.1 04/24/2023    ALBUMIN 4.2 03/25/2024        Lab Results   Component Value Date     (H) 03/25/2024    HGBA1C 8.2 (H) 03/25/2024       Last Cardiology Tests:  ECG:  In Office EKG 10/10/2023  -- AV Pacing   Echo:  Echocardiogram 01/2021  1. The left ventricular systolic function is normal with a 55% estimated ejection fraction.  2. Mid and apical inferior septum and basal inferior segment are abnormal.This is unchanged.  3. Moderately increased left ventricular septal thickness.  4. There is an elevated mean left atrial pressure.  5. Slightly elevated RVSP.  6. The left atrium is abnormally small.  7. The left ventricular cavity size is decreased     Cath:  Cardiac catheterization and PCI 2019  A 4 Korean pigtail was used to cross the aortic valve for LV pressure  measurement and pullback.     Coronary Angiography:  The coronary circulation is right dominant.     Left Main Coronary Artery:  The left main coronary artery is a normal caliber vessel. The left main arises  normally from the left coronary sinus of Valsalva and bifurcates into the LAD  and circumflex coronary arteries. The left main coronary artery showed  calcification. The distal left main coronary artery showed 10 to 30% stenosis.     Left Anterior Descending Coronary Artery Distribution:  The left anterior descending coronary artery is a normal caliber vessel. The  LAD  arises normally from the left main coronary artery. The LAD demonstrated  calcification. The proximal left anterior descending coronary artery showed 40%  stenosis. An additional lesion, located at the proximal left anterior descending  coronary artery, revealed 90% stenosis. The 1st diagonal branch is a normal  caliber vessel. The proximal 1st diagonal branch revealed 80% stenosis. The 2nd  diagonal branch is a small caliber vessel. The proximal 2nd diagonal branch  showed 70% stenosis.        Valve Findings:  No aortic valve stenosis is visualized.     Coronary Interventions:  Angiography reveals a 90% stenosis of the proximal left anterior descending.  Pre-intervention LIZANDRO flow was 3. Percutaneous coronary intervention was  performed within the proximal left anterior descending. The stenosis was  successfully reduced from 90% to 0%. Post-intervention LIZANDRO flow was 3.  Successful PCI of the proximal LAD with a 2.75 x 12 mm Resolute Kite  drug-eluting stent postdilated up to 3.5 mm.     Coronary Intervention Comments:  An BehavioSec L 3.5 guide was used to engage the left main. A run-through wire was  placed in the distal LAD. A Akira Blue wire was attempted to be placed in the  first diagonal. Due to vessel angulation wire could not be placed there. A 2.0 x  12 mm semi-compliant balloon would not cross the proximal LAD. Subsequently a  guide liner was used to assist in delivering equipment. The lesion was  predilated with a 2.0 x 12 mm semi-compliant balloon at 8 guillermo. The guide liner  was advanced into the LAD while the balloon was inflated. The stent was then  delivered across the lesion. A 2.75 x 12 mm resolute case drug-eluting stent was  then deployed at 10 guillermo. A 3.25 x 8 mm noncompliant balloon would not cross the  mid stent. Subsequently the Akira Blue wire was placed in the distal LAD to be  used as a delia wire. The proximal portion of the stent was postdilated with a  3.25 x 8 mm noncompliant balloon up  "to 16 guillermo. A 3.25 x 6 mm semi-compliant  balloon was used to post dilate the distal stent up to 14 guillermo. A 3.5 x 6 mm  noncompliant balloon was then used to post dilate the entire stented up to 16  guillermo. Final angiography revealed LIZANDRO 3 flow with 0% residual stenosis. The  jailed diagonal had LIZANDRO 3 flow with moderate to severe ostial disease.     Coronary Lesion Summary:  Vessel Stenosis Vessel Segment  Left Main 10 to 30% stenosis distal  LAD 40% stenosis proximal  LAD 90% stenosis proximal  1st Diagonal 80% stenosis proximal  2nd Diagonal 70% stenosis proximal      Lab review: I have Chemistry CMP:   Lab Results   Component Value Date    ALBUMIN 4.2 03/25/2024    CALCIUM 8.6 03/25/2024    CO2 25 03/25/2024    CREATININE 1.98 (H) 03/25/2024    GLUCOSE 245 (H) 03/25/2024    BILITOT 0.3 03/25/2024    PROT 7.1 03/25/2024    ALT 11 03/25/2024    AST 14 03/25/2024    ALKPHOS 87 03/25/2024   , Chemistry BMP   Lab Results   Component Value Date    GLUCOSE 245 (H) 03/25/2024    CALCIUM 8.6 03/25/2024    CO2 25 03/25/2024    CREATININE 1.98 (H) 03/25/2024   , CBC:  Lab Results   Component Value Date    WBC 6.3 03/25/2024    RBC 4.72 03/25/2024    HGB 10.7 (L) 03/25/2024    HCT 36.7 03/25/2024    MCV 78 (L) 03/25/2024    MCH 22.7 (L) 03/25/2024    MCHC 29.2 (L) 03/25/2024    RDW 16.3 (H) 03/25/2024    NRBC 0.0 03/25/2024   , Coags:   Lab Results   Component Value Date    APTT 25 (L) 02/06/2023    FIBRINOGEN 429 (H) 11/08/2019    INR 1.0 02/06/2023   , Lipids:   Lab Results   Component Value Date    CHOL 235 (H) 03/25/2024    HDL 62.8 03/25/2024    LDLCALC 125 (H) 03/25/2024    TRIG 235 (H) 03/25/2024   , Cardiac Enzymes: No results found for: \"CKTOTAL\", \"CKMB\", \"CKMBINDEX\", \"TROPONINT\", and POC Glucose:   Lab Results   Component Value Date    GLUCOSE 245 (H) 03/25/2024     Diagnostic review: I have personally reviewed the result(s) of the EKG and Echocardiogram .   Imaging review: I have  personally reviewed the result(s) " Device Interrogation     Assessment/Plan   Problem List Items Addressed This Visit             ICD-10-CM    3-vessel coronary artery disease - Primary I25.10    Abdominal aortic aneurysm (AAA) (CMS-[facility]) I71.40    Benign essential HTN I10    Chronic systolic congestive heart failure (Multi) I50.22    History of endovascular stent graft for abdominal aortic aneurysm (AAA) Z95.828       Overall, she continues to do well from a cardiovascular standpoint.  She offers no significant complaints.    Her care has been consolidated with both electrophysiology And cardiology at [facility].  --She offers no significant complaints at this time.  She is satisfied with her increased weight gain, and except increased cholesterol levels as a result of trying to improve diet.  We will continue to follow this closely to see whether or not further interventions are needed.  Otherwise, she remains well compensated.  Continue current medications at this time.      Ruben Sanchez, DO

## 2024-04-10 DIAGNOSIS — I25.10 3-VESSEL CORONARY ARTERY DISEASE: ICD-10-CM

## 2024-04-10 RX ORDER — LISINOPRIL 2.5 MG/1
2.5 TABLET ORAL DAILY
Qty: 100 TABLET | Refills: 2 | Status: SHIPPED | OUTPATIENT
Start: 2024-04-10 | End: 2025-02-04

## 2024-04-10 RX ORDER — FUROSEMIDE 40 MG/1
40 TABLET ORAL DAILY
Qty: 100 TABLET | Refills: 2 | Status: SHIPPED | OUTPATIENT
Start: 2024-04-10 | End: 2024-04-30 | Stop reason: SDUPTHER

## 2024-04-10 RX ORDER — METOPROLOL TARTRATE 25 MG/1
25 TABLET, FILM COATED ORAL 2 TIMES DAILY
Qty: 200 TABLET | Refills: 2 | Status: SHIPPED | OUTPATIENT
Start: 2024-04-10

## 2024-04-12 ENCOUNTER — ANCILLARY PROCEDURE (OUTPATIENT)
Dept: CARDIOLOGY | Facility: CLINIC | Age: 89
End: 2024-04-12
Payer: MEDICARE

## 2024-04-12 PROCEDURE — 93005 ELECTROCARDIOGRAM TRACING: CPT

## 2024-04-30 DIAGNOSIS — I25.10 3-VESSEL CORONARY ARTERY DISEASE: ICD-10-CM

## 2024-04-30 RX ORDER — FUROSEMIDE 40 MG/1
40 TABLET ORAL DAILY
Qty: 100 TABLET | Refills: 2 | Status: SHIPPED | OUTPATIENT
Start: 2024-04-30 | End: 2025-04-30

## 2024-05-09 LAB
ATRIAL RATE: 79 BPM
P AXIS: 10 DEGREES
P OFFSET: 171 MS
P ONSET: 136 MS
PR INTERVAL: 134 MS
Q ONSET: 183 MS
QRS COUNT: 13 BEATS
QRS DURATION: 158 MS
QT INTERVAL: 432 MS
QTC CALCULATION(BAZETT): 495 MS
QTC FREDERICIA: 473 MS
R AXIS: 5 DEGREES
T AXIS: 179 DEGREES
T OFFSET: 399 MS
VENTRICULAR RATE: 79 BPM

## 2024-06-30 ENCOUNTER — APPOINTMENT (OUTPATIENT)
Dept: RADIOLOGY | Facility: HOSPITAL | Age: 89
End: 2024-06-30
Payer: MEDICARE

## 2024-06-30 ENCOUNTER — HOSPITAL ENCOUNTER (EMERGENCY)
Facility: HOSPITAL | Age: 89
Discharge: HOME | End: 2024-06-30
Attending: STUDENT IN AN ORGANIZED HEALTH CARE EDUCATION/TRAINING PROGRAM
Payer: MEDICARE

## 2024-06-30 VITALS
OXYGEN SATURATION: 97 % | SYSTOLIC BLOOD PRESSURE: 168 MMHG | BODY MASS INDEX: 18.26 KG/M2 | HEIGHT: 60 IN | HEART RATE: 88 BPM | WEIGHT: 93 LBS | TEMPERATURE: 98.5 F | DIASTOLIC BLOOD PRESSURE: 68 MMHG | RESPIRATION RATE: 16 BRPM
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
## 2024-06-30 DIAGNOSIS — M25.551 PAIN OF RIGHT HIP: ICD-10-CM

## 2024-06-30 PROCEDURE — 72125 CT NECK SPINE W/O DYE: CPT

## 2024-06-30 PROCEDURE — 99285 EMERGENCY DEPT VISIT HI MDM: CPT | Mod: 25

## 2024-06-30 PROCEDURE — 2500000005 HC RX 250 GENERAL PHARMACY W/O HCPCS

## 2024-06-30 PROCEDURE — 70450 CT HEAD/BRAIN W/O DYE: CPT

## 2024-06-30 PROCEDURE — 72125 CT NECK SPINE W/O DYE: CPT | Performed by: RADIOLOGY

## 2024-06-30 PROCEDURE — 73552 X-RAY EXAM OF FEMUR 2/>: CPT | Mod: RIGHT SIDE | Performed by: RADIOLOGY

## 2024-06-30 PROCEDURE — 70450 CT HEAD/BRAIN W/O DYE: CPT | Performed by: RADIOLOGY

## 2024-06-30 PROCEDURE — 73552 X-RAY EXAM OF FEMUR 2/>: CPT | Mod: RT

## 2024-06-30 PROCEDURE — 73502 X-RAY EXAM HIP UNI 2-3 VIEWS: CPT | Mod: RIGHT SIDE | Performed by: RADIOLOGY

## 2024-06-30 PROCEDURE — 2500000001 HC RX 250 WO HCPCS SELF ADMINISTERED DRUGS (ALT 637 FOR MEDICARE OP)

## 2024-06-30 PROCEDURE — 73502 X-RAY EXAM HIP UNI 2-3 VIEWS: CPT | Mod: RT

## 2024-06-30 RX ORDER — LIDOCAINE 50 MG/G
1 PATCH TOPICAL DAILY
Qty: 7 PATCH | Refills: 0 | Status: ON HOLD | OUTPATIENT
Start: 2024-06-30 | End: 2024-07-07

## 2024-06-30 RX ORDER — METHOCARBAMOL 500 MG/1
500 TABLET, FILM COATED ORAL ONCE
Status: COMPLETED | OUTPATIENT
Start: 2024-06-30 | End: 2024-06-30

## 2024-06-30 RX ORDER — LIDOCAINE 560 MG/1
1 PATCH PERCUTANEOUS; TOPICAL; TRANSDERMAL DAILY
Status: DISCONTINUED | OUTPATIENT
Start: 2024-06-30 | End: 2024-06-30 | Stop reason: HOSPADM

## 2024-06-30 RX ORDER — ACETAMINOPHEN 325 MG/1
650 TABLET ORAL EVERY 6 HOURS PRN
Qty: 60 TABLET | Refills: 0 | Status: ON HOLD | OUTPATIENT
Start: 2024-06-30 | End: 2024-07-14

## 2024-06-30 RX ORDER — METHOCARBAMOL 500 MG/1
500 TABLET, FILM COATED ORAL 4 TIMES DAILY
Qty: 28 TABLET | Refills: 0 | Status: ON HOLD | OUTPATIENT
Start: 2024-06-30 | End: 2024-07-07

## 2024-06-30 RX ORDER — ACETAMINOPHEN 325 MG/1
975 TABLET ORAL ONCE
Status: COMPLETED | OUTPATIENT
Start: 2024-06-30 | End: 2024-06-30

## 2024-06-30 ASSESSMENT — PAIN DESCRIPTION - LOCATION: LOCATION: HIP

## 2024-06-30 ASSESSMENT — PAIN SCALES - GENERAL
PAINLEVEL_OUTOF10: 9
PAINLEVEL_OUTOF10: 2
PAINLEVEL_OUTOF10: 5 - MODERATE PAIN
PAINLEVEL_OUTOF10: 2
PAINLEVEL_OUTOF10: 9
PAINLEVEL_OUTOF10: 4
PAINLEVEL_OUTOF10: 0 - NO PAIN

## 2024-06-30 ASSESSMENT — PAIN DESCRIPTION - ORIENTATION: ORIENTATION: RIGHT

## 2024-06-30 NOTE — DISCHARGE INSTRUCTIONS
You have been evaluated in the Emergency Department today for hip pain. Please return if unable to walk and pain does not improve.     Please follow up with your primary care physician within two days. If you do not have a primary care doctor you may call [phone] to make an appointment.    Return to the Emergency Department if you develop any new or worsening symptoms.   Thank you for choosing us for your care.

## 2024-06-30 NOTE — ED PROVIDER NOTES
Emergency Department Provider Note        History of Present Illness     History provided by: Patient and EMS  Limitations to History: None  External Records Reviewed with Brief Summary: Outpatient progress note from cardiology in april which showed visit for management of heart block, NSTEMI and AAA    HPI:  Maci Catalan is a 89 y.o. female with a past medical history of NSTEMI, complete heart block s/p pacemaker, AAA who is presenting for a  mechanical fall. Patient reports that she slipped on a grocery bag yesterday. She reports that she fell on her butt. Denies LOC. Denies blood thinners. She reports that she was able to ambulate afterwards but has been having worsening right hip pain since walking on it.     Physical Exam   Triage vitals:  T 36.9 °C (98.5 °F)  HR 93  /64  RR 17  O2   None (Room air)    Physical Exam  Vitals and nursing note reviewed.   Constitutional:       General: She is not in acute distress.  HENT:      Head: Normocephalic and atraumatic.   Eyes:      Conjunctiva/sclera: Conjunctivae normal.   Neck:      Comments: No cervical spine tenderness.   Cardiovascular:      Rate and Rhythm: Normal rate and regular rhythm.   Pulmonary:      Effort: Pulmonary effort is normal. No respiratory distress.      Breath sounds: Normal breath sounds.   Abdominal:      General: There is no distension.      Palpations: Abdomen is soft.   Musculoskeletal:         General: No deformity.      Cervical back: Normal range of motion. No tenderness.      Comments: 2+ DP and PT pulses. Able to wiggle her toes. Flex and extend her ankles.sensation intact to light touch. Pain to palpation of the right femur   Skin:     General: Skin is warm and dry.   Neurological:      Mental Status: She is alert and oriented to person, place, and time.      Comments: GCS 15   Psychiatric:         Mood and Affect: Mood normal.         Behavior: Behavior normal.          Medical Decision Making & ED Course   Medical  Decision Makin y.o. female with a past medical history of complete heart block status post pacemaker, AAA, NSTEMI who is presenting today with mechanical fall.  Do not think this is a syncopal episode.  Will obtain a CT head and C-spine based on the patient's age (89.  Will also obtain an x-ray of the right hip including the right femur.  No indication for additional lab work at this time EKGs.  Patient was offered pain meds and was requesting Tylenol.  X-ray of the right hip and femur were both negative. CT head and cervical spine were both negative. Patient was unable to ambulate initially. CT hip was ordered .Gave tylenol, robaxin and lidocaine patch. Patient was able to ambulate after Robaxin Tylenol and lidocaine patch.  Patient was informed of the risks of leaving without the CT hip.  Patient reports that she does not want this.  She was given strict return precautions to come back if she is unable to ambulate having worsening pain no improvement with outpatient management.  Patient was discharged home in stable condition.  ----      Differential diagnoses considered include but are not limited to: Femur fracture versus MSK pain     Social Determinants of Health which Significantly Impact Care: None identified     EKG Independent Interpretation: EKG not obtained    Independent Result Review and Interpretation: Relevant laboratory and radiographic results were reviewed and independently interpreted by myself.  As necessary, they are commented on in the ED Course.    Chronic conditions affecting the patient's care: As documented above in [place]    The patient was discussed with the following consultants/services: None    Care Considerations: As documented above in [place]    ED Course:  ED Course as of 24 1558   Sun 2024   1234 X-ray of the hip and femur were negative. [JYOTI]   1241 CT head was negative [JYOTI]   1244 CT cervical spine wo IV contrast  negative [JYOTI]   1555 Patient was able to ambulate  after the x-rays.  And pain meds.  Patient did not want to wait for the CT imaging.  Low concern for fracture however patient was informed that we cannot rule out an occult fracture without a CT hip. Patient wants to be discharged home with her sister. Given strict return precautions.  [JYOTI]      ED Course User Index  [JYOTI] Frida Vivas MD         Diagnoses as of 06/30/24 1558   Fall, initial encounter   Pain of right hip     Disposition   As a result of the work-up, the patient was discharged home.  she was informed of her diagnosis and instructed to come back with any concerns or worsening of condition.  she and was agreeable to the plan as discussed above.  she was given the opportunity to ask questions.  All of the patient's questions were answered.    Procedures   Procedures    Patient seen and discussed with ED attending physician.    Frida Vivas MD  Emergency Medicine     Frida Vivas MD  Resident  06/30/24 [id number]

## 2024-06-30 NOTE — PROGRESS NOTES
Patient is a an 89-year-old female who presented to the emergency room after mechanical fall yesterday with hip pain and difficulty with ambulation.  She was initially seen and evaluated by Dr. Cook who obtained imaging that was negative.  Patient was still complaining of discomfort with ambulation and inability to ambulate appropriately so he added CT imaging to evaluate for occult fracture and patient was signed out to me pending these results.  While awaiting obtaining CT patient was able to ambulate without difficulty and stated that her pain was much improved for resident.  It was discussed that she had pending CT imaging but she felt that since she was back to her baseline and was able to ambulate comfortably she could go home and return should her symptoms get worse.  She has decision-making capacity and is otherwise well.  She will follow-up with her primary care doctor.  She is educated on supportive care at home as well as signs and symptoms that should prompt immediate return to emergency room.      Jasmyn Goodson MD

## 2024-06-30 NOTE — Clinical Note
Maci Catalan was seen and treated in our emergency department on 6/30/2024.  She may return to work on 07/02/2024.       If you have any questions or concerns, please don't hesitate to call.      Frida Vivas MD

## 2024-07-04 ENCOUNTER — ANESTHESIA (OUTPATIENT)
Dept: OPERATING ROOM | Facility: HOSPITAL | Age: 89
End: 2024-07-04
Payer: MEDICARE

## 2024-07-04 ENCOUNTER — APPOINTMENT (OUTPATIENT)
Dept: RADIOLOGY | Facility: HOSPITAL | Age: 89
DRG: 481 | End: 2024-07-04
Payer: MEDICARE

## 2024-07-04 ENCOUNTER — HOSPITAL ENCOUNTER (INPATIENT)
Facility: HOSPITAL | Age: 89
DRG: 481 | End: 2024-07-04
Attending: EMERGENCY MEDICINE | Admitting: FAMILY MEDICINE
Payer: MEDICARE

## 2024-07-04 ENCOUNTER — ANESTHESIA EVENT (OUTPATIENT)
Dept: OPERATING ROOM | Facility: HOSPITAL | Age: 89
End: 2024-07-04
Payer: MEDICARE

## 2024-07-04 DIAGNOSIS — I25.10 3-VESSEL CORONARY ARTERY DISEASE: ICD-10-CM

## 2024-07-04 DIAGNOSIS — E11.9 DIABETES MELLITUS TYPE 2, NONINSULIN DEPENDENT (MULTI): ICD-10-CM

## 2024-07-04 DIAGNOSIS — R53.81 PHYSICAL DECONDITIONING: ICD-10-CM

## 2024-07-04 DIAGNOSIS — S72.001A CLOSED RIGHT HIP FRACTURE, INITIAL ENCOUNTER (MULTI): Primary | ICD-10-CM

## 2024-07-04 LAB
ABO GROUP (TYPE) IN BLOOD: NORMAL
ANION GAP SERPL CALC-SCNC: 16 MMOL/L (ref 10–20)
ANION GAP SERPL CALC-SCNC: 19 MMOL/L (ref 10–20)
ANTIBODY SCREEN: NORMAL
APTT PPP: 29 SECONDS (ref 27–38)
BASOPHILS # BLD AUTO: 0.04 X10*3/UL (ref 0–0.1)
BASOPHILS NFR BLD AUTO: 0.4 %
BB ANTIBODY IDENTIFICATION: NORMAL
BUN SERPL-MCNC: 56 MG/DL (ref 6–23)
BUN SERPL-MCNC: 57 MG/DL (ref 6–23)
CALCIUM SERPL-MCNC: 8.3 MG/DL (ref 8.6–10.3)
CALCIUM SERPL-MCNC: 8.6 MG/DL (ref 8.6–10.3)
CASE #: NORMAL
CHLORIDE SERPL-SCNC: 104 MMOL/L (ref 98–107)
CHLORIDE SERPL-SCNC: 107 MMOL/L (ref 98–107)
CO2 SERPL-SCNC: 18 MMOL/L (ref 21–32)
CO2 SERPL-SCNC: 21 MMOL/L (ref 21–32)
CREAT SERPL-MCNC: 2.73 MG/DL (ref 0.5–1.05)
CREAT SERPL-MCNC: 2.84 MG/DL (ref 0.5–1.05)
EGFRCR SERPLBLD CKD-EPI 2021: 15 ML/MIN/1.73M*2
EGFRCR SERPLBLD CKD-EPI 2021: 16 ML/MIN/1.73M*2
EOSINOPHIL # BLD AUTO: 0.03 X10*3/UL (ref 0–0.4)
EOSINOPHIL NFR BLD AUTO: 0.3 %
ERYTHROCYTE [DISTWIDTH] IN BLOOD BY AUTOMATED COUNT: 16.4 % (ref 11.5–14.5)
ERYTHROCYTE [DISTWIDTH] IN BLOOD BY AUTOMATED COUNT: 16.7 % (ref 11.5–14.5)
GLUCOSE SERPL-MCNC: 128 MG/DL (ref 74–99)
GLUCOSE SERPL-MCNC: 199 MG/DL (ref 74–99)
HCT VFR BLD AUTO: 33.3 % (ref 36–46)
HCT VFR BLD AUTO: 34.6 % (ref 36–46)
HGB BLD-MCNC: 10.4 G/DL (ref 12–16)
HGB BLD-MCNC: 9.8 G/DL (ref 12–16)
IMM GRANULOCYTES # BLD AUTO: 0.09 X10*3/UL (ref 0–0.5)
IMM GRANULOCYTES NFR BLD AUTO: 0.8 % (ref 0–0.9)
INR PPP: 1 (ref 0.9–1.1)
LYMPHOCYTES # BLD AUTO: 0.51 X10*3/UL (ref 0.8–3)
LYMPHOCYTES NFR BLD AUTO: 4.7 %
MCH RBC QN AUTO: 22.7 PG (ref 26–34)
MCH RBC QN AUTO: 22.8 PG (ref 26–34)
MCHC RBC AUTO-ENTMCNC: 29.4 G/DL (ref 32–36)
MCHC RBC AUTO-ENTMCNC: 30.1 G/DL (ref 32–36)
MCV RBC AUTO: 76 FL (ref 80–100)
MCV RBC AUTO: 77 FL (ref 80–100)
MONOCYTES # BLD AUTO: 0.53 X10*3/UL (ref 0.05–0.8)
MONOCYTES NFR BLD AUTO: 4.9 %
NEUTROPHILS # BLD AUTO: 9.69 X10*3/UL (ref 1.6–5.5)
NEUTROPHILS NFR BLD AUTO: 88.9 %
NRBC BLD-RTO: 0 /100 WBCS (ref 0–0)
NRBC BLD-RTO: 0 /100 WBCS (ref 0–0)
PLATELET # BLD AUTO: 211 X10*3/UL (ref 150–450)
PLATELET # BLD AUTO: 233 X10*3/UL (ref 150–450)
POTASSIUM SERPL-SCNC: 3.4 MMOL/L (ref 3.5–5.3)
POTASSIUM SERPL-SCNC: 3.5 MMOL/L (ref 3.5–5.3)
PROTHROMBIN TIME: 11.7 SECONDS (ref 9.8–12.8)
RBC # BLD AUTO: 4.31 X10*6/UL (ref 4–5.2)
RBC # BLD AUTO: 4.56 X10*6/UL (ref 4–5.2)
RH FACTOR (ANTIGEN D): NORMAL
SODIUM SERPL-SCNC: 137 MMOL/L (ref 136–145)
SODIUM SERPL-SCNC: 141 MMOL/L (ref 136–145)
WBC # BLD AUTO: 10.9 X10*3/UL (ref 4.4–11.3)
WBC # BLD AUTO: 7.8 X10*3/UL (ref 4.4–11.3)

## 2024-07-04 PROCEDURE — 73700 CT LOWER EXTREMITY W/O DYE: CPT | Mod: FOREIGN READ | Performed by: RADIOLOGY

## 2024-07-04 PROCEDURE — 72192 CT PELVIS W/O DYE: CPT | Mod: FOREIGN READ | Performed by: RADIOLOGY

## 2024-07-04 PROCEDURE — 85027 COMPLETE CBC AUTOMATED: CPT | Performed by: FAMILY MEDICINE

## 2024-07-04 PROCEDURE — 86900 BLOOD TYPING SEROLOGIC ABO: CPT | Performed by: PHYSICIAN ASSISTANT

## 2024-07-04 PROCEDURE — 72192 CT PELVIS W/O DYE: CPT

## 2024-07-04 PROCEDURE — 71045 X-RAY EXAM CHEST 1 VIEW: CPT

## 2024-07-04 PROCEDURE — 86922 COMPATIBILITY TEST ANTIGLOB: CPT

## 2024-07-04 PROCEDURE — 73502 X-RAY EXAM HIP UNI 2-3 VIEWS: CPT | Mod: RIGHT SIDE | Performed by: RADIOLOGY

## 2024-07-04 PROCEDURE — 96374 THER/PROPH/DIAG INJ IV PUSH: CPT | Mod: 59

## 2024-07-04 PROCEDURE — 1200000002 HC GENERAL ROOM WITH TELEMETRY DAILY

## 2024-07-04 PROCEDURE — 73552 X-RAY EXAM OF FEMUR 2/>: CPT | Mod: RIGHT SIDE | Performed by: RADIOLOGY

## 2024-07-04 PROCEDURE — 2500000001 HC RX 250 WO HCPCS SELF ADMINISTERED DRUGS (ALT 637 FOR MEDICARE OP): Performed by: FAMILY MEDICINE

## 2024-07-04 PROCEDURE — 96375 TX/PRO/DX INJ NEW DRUG ADDON: CPT

## 2024-07-04 PROCEDURE — 99223 1ST HOSP IP/OBS HIGH 75: CPT | Performed by: STUDENT IN AN ORGANIZED HEALTH CARE EDUCATION/TRAINING PROGRAM

## 2024-07-04 PROCEDURE — 93005 ELECTROCARDIOGRAM TRACING: CPT

## 2024-07-04 PROCEDURE — 73552 X-RAY EXAM OF FEMUR 2/>: CPT | Mod: RT

## 2024-07-04 PROCEDURE — 36415 COLL VENOUS BLD VENIPUNCTURE: CPT | Performed by: PHYSICIAN ASSISTANT

## 2024-07-04 PROCEDURE — 85025 COMPLETE CBC W/AUTO DIFF WBC: CPT | Performed by: PHYSICIAN ASSISTANT

## 2024-07-04 PROCEDURE — 80048 BASIC METABOLIC PNL TOTAL CA: CPT | Performed by: PHYSICIAN ASSISTANT

## 2024-07-04 PROCEDURE — 73700 CT LOWER EXTREMITY W/O DYE: CPT | Mod: RT

## 2024-07-04 PROCEDURE — 99221 1ST HOSP IP/OBS SF/LOW 40: CPT

## 2024-07-04 PROCEDURE — 85610 PROTHROMBIN TIME: CPT | Performed by: PHYSICIAN ASSISTANT

## 2024-07-04 PROCEDURE — 99285 EMERGENCY DEPT VISIT HI MDM: CPT | Mod: 25

## 2024-07-04 PROCEDURE — 2500000004 HC RX 250 GENERAL PHARMACY W/ HCPCS (ALT 636 FOR OP/ED): Performed by: FAMILY MEDICINE

## 2024-07-04 PROCEDURE — 73564 X-RAY EXAM KNEE 4 OR MORE: CPT | Mod: RIGHT SIDE | Performed by: RADIOLOGY

## 2024-07-04 PROCEDURE — 2500000004 HC RX 250 GENERAL PHARMACY W/ HCPCS (ALT 636 FOR OP/ED): Performed by: PHYSICIAN ASSISTANT

## 2024-07-04 PROCEDURE — 73564 X-RAY EXAM KNEE 4 OR MORE: CPT | Mod: RT

## 2024-07-04 PROCEDURE — 80048 BASIC METABOLIC PNL TOTAL CA: CPT | Performed by: FAMILY MEDICINE

## 2024-07-04 PROCEDURE — 85730 THROMBOPLASTIN TIME PARTIAL: CPT | Performed by: PHYSICIAN ASSISTANT

## 2024-07-04 PROCEDURE — 73502 X-RAY EXAM HIP UNI 2-3 VIEWS: CPT | Mod: RT

## 2024-07-04 RX ORDER — TALC
9 POWDER (GRAM) TOPICAL NIGHTLY
Status: DISCONTINUED | OUTPATIENT
Start: 2024-07-04 | End: 2024-07-11 | Stop reason: HOSPADM

## 2024-07-04 RX ORDER — SUCRALFATE 1 G/1
1 TABLET ORAL 4 TIMES DAILY
Status: DISCONTINUED | OUTPATIENT
Start: 2024-07-04 | End: 2024-07-11 | Stop reason: HOSPADM

## 2024-07-04 RX ORDER — ACETAMINOPHEN 650 MG/1
650 SUPPOSITORY RECTAL EVERY 4 HOURS PRN
Status: DISCONTINUED | OUTPATIENT
Start: 2024-07-04 | End: 2024-07-11 | Stop reason: HOSPADM

## 2024-07-04 RX ORDER — ONDANSETRON HYDROCHLORIDE 2 MG/ML
4 INJECTION, SOLUTION INTRAVENOUS EVERY 8 HOURS PRN
Status: DISCONTINUED | OUTPATIENT
Start: 2024-07-04 | End: 2024-07-11 | Stop reason: HOSPADM

## 2024-07-04 RX ORDER — FLUTICASONE PROPIONATE 50 MCG
1 SPRAY, SUSPENSION (ML) NASAL DAILY
COMMUNITY

## 2024-07-04 RX ORDER — FUROSEMIDE 40 MG/1
40 TABLET ORAL DAILY
Status: DISCONTINUED | OUTPATIENT
Start: 2024-07-05 | End: 2024-07-11 | Stop reason: HOSPADM

## 2024-07-04 RX ORDER — ONDANSETRON 4 MG/1
4 TABLET, FILM COATED ORAL EVERY 8 HOURS PRN
Status: DISCONTINUED | OUTPATIENT
Start: 2024-07-04 | End: 2024-07-11 | Stop reason: HOSPADM

## 2024-07-04 RX ORDER — METOPROLOL TARTRATE 25 MG/1
25 TABLET, FILM COATED ORAL 2 TIMES DAILY
Status: DISCONTINUED | OUTPATIENT
Start: 2024-07-04 | End: 2024-07-11 | Stop reason: HOSPADM

## 2024-07-04 RX ORDER — METHOCARBAMOL 500 MG/1
500 TABLET, FILM COATED ORAL 4 TIMES DAILY
Status: DISCONTINUED | OUTPATIENT
Start: 2024-07-04 | End: 2024-07-11 | Stop reason: HOSPADM

## 2024-07-04 RX ORDER — SODIUM CHLORIDE, SODIUM LACTATE, POTASSIUM CHLORIDE, CALCIUM CHLORIDE 600; 310; 30; 20 MG/100ML; MG/100ML; MG/100ML; MG/100ML
50 INJECTION, SOLUTION INTRAVENOUS CONTINUOUS
Status: DISCONTINUED | OUTPATIENT
Start: 2024-07-04 | End: 2024-07-05

## 2024-07-04 RX ORDER — PANTOPRAZOLE SODIUM 40 MG/1
40 TABLET, DELAYED RELEASE ORAL
Status: DISCONTINUED | OUTPATIENT
Start: 2024-07-05 | End: 2024-07-11 | Stop reason: HOSPADM

## 2024-07-04 RX ORDER — METFORMIN HYDROCHLORIDE 500 MG/1
500 TABLET ORAL EVERY 12 HOURS
Status: DISCONTINUED | OUTPATIENT
Start: 2024-07-04 | End: 2024-07-04

## 2024-07-04 RX ORDER — TALC
3 POWDER (GRAM) TOPICAL NIGHTLY PRN
Status: DISCONTINUED | OUTPATIENT
Start: 2024-07-04 | End: 2024-07-04

## 2024-07-04 RX ORDER — MORPHINE SULFATE 4 MG/ML
4 INJECTION, SOLUTION INTRAMUSCULAR; INTRAVENOUS ONCE
Status: COMPLETED | OUTPATIENT
Start: 2024-07-04 | End: 2024-07-04

## 2024-07-04 RX ORDER — HYDROCODONE BITARTRATE AND ACETAMINOPHEN 5; 325 MG/1; MG/1
1 TABLET ORAL EVERY 6 HOURS PRN
COMMUNITY
End: 2024-07-11 | Stop reason: HOSPADM

## 2024-07-04 RX ORDER — ACETAMINOPHEN 325 MG/1
650 TABLET ORAL EVERY 4 HOURS PRN
Status: DISCONTINUED | OUTPATIENT
Start: 2024-07-04 | End: 2024-07-11 | Stop reason: HOSPADM

## 2024-07-04 RX ORDER — ACETAMINOPHEN 325 MG/1
650 TABLET ORAL EVERY 6 HOURS PRN
Status: DISCONTINUED | OUTPATIENT
Start: 2024-07-04 | End: 2024-07-11 | Stop reason: HOSPADM

## 2024-07-04 RX ORDER — MORPHINE SULFATE 4 MG/ML
4 INJECTION, SOLUTION INTRAMUSCULAR; INTRAVENOUS EVERY 4 HOURS PRN
Status: DISCONTINUED | OUTPATIENT
Start: 2024-07-04 | End: 2024-07-06

## 2024-07-04 RX ORDER — ACETAMINOPHEN 160 MG/5ML
650 SOLUTION ORAL EVERY 4 HOURS PRN
Status: DISCONTINUED | OUTPATIENT
Start: 2024-07-04 | End: 2024-07-11 | Stop reason: HOSPADM

## 2024-07-04 RX ORDER — NITROGLYCERIN 0.4 MG/1
0.4 TABLET SUBLINGUAL EVERY 5 MIN PRN
Status: DISCONTINUED | OUTPATIENT
Start: 2024-07-04 | End: 2024-07-11 | Stop reason: HOSPADM

## 2024-07-04 RX ORDER — FLUTICASONE PROPIONATE 50 MCG
1 SPRAY, SUSPENSION (ML) NASAL DAILY
Status: DISCONTINUED | OUTPATIENT
Start: 2024-07-04 | End: 2024-07-11 | Stop reason: HOSPADM

## 2024-07-04 RX ORDER — LISINOPRIL 5 MG/1
2.5 TABLET ORAL DAILY
Status: DISCONTINUED | OUTPATIENT
Start: 2024-07-04 | End: 2024-07-11 | Stop reason: HOSPADM

## 2024-07-04 RX ORDER — ONDANSETRON HYDROCHLORIDE 2 MG/ML
4 INJECTION, SOLUTION INTRAVENOUS ONCE
Status: COMPLETED | OUTPATIENT
Start: 2024-07-04 | End: 2024-07-04

## 2024-07-04 SDOH — SOCIAL STABILITY: SOCIAL INSECURITY: HAVE YOU HAD THOUGHTS OF HARMING ANYONE ELSE?: NO

## 2024-07-04 SDOH — SOCIAL STABILITY: SOCIAL INSECURITY: HAS ANYONE EVER THREATENED TO HURT YOUR FAMILY OR YOUR PETS?: NO

## 2024-07-04 SDOH — SOCIAL STABILITY: SOCIAL INSECURITY: DO YOU FEEL UNSAFE GOING BACK TO THE PLACE WHERE YOU ARE LIVING?: NO

## 2024-07-04 SDOH — SOCIAL STABILITY: SOCIAL INSECURITY: ARE YOU OR HAVE YOU BEEN THREATENED OR ABUSED PHYSICALLY, EMOTIONALLY, OR SEXUALLY BY ANYONE?: NO

## 2024-07-04 SDOH — SOCIAL STABILITY: SOCIAL INSECURITY: DO YOU FEEL ANYONE HAS EXPLOITED OR TAKEN ADVANTAGE OF YOU FINANCIALLY OR OF YOUR PERSONAL PROPERTY?: NO

## 2024-07-04 SDOH — SOCIAL STABILITY: SOCIAL INSECURITY: ARE THERE ANY APPARENT SIGNS OF INJURIES/BEHAVIORS THAT COULD BE RELATED TO ABUSE/NEGLECT?: NO

## 2024-07-04 SDOH — SOCIAL STABILITY: SOCIAL INSECURITY: HAVE YOU HAD ANY THOUGHTS OF HARMING ANYONE ELSE?: NO

## 2024-07-04 SDOH — SOCIAL STABILITY: SOCIAL INSECURITY: WERE YOU ABLE TO COMPLETE ALL THE BEHAVIORAL HEALTH SCREENINGS?: YES

## 2024-07-04 SDOH — SOCIAL STABILITY: SOCIAL INSECURITY: ABUSE: ADULT

## 2024-07-04 SDOH — SOCIAL STABILITY: SOCIAL INSECURITY: DOES ANYONE TRY TO KEEP YOU FROM HAVING/CONTACTING OTHER FRIENDS OR DOING THINGS OUTSIDE YOUR HOME?: NO

## 2024-07-04 ASSESSMENT — COGNITIVE AND FUNCTIONAL STATUS - GENERAL
DRESSING REGULAR UPPER BODY CLOTHING: A LITTLE
MOVING TO AND FROM BED TO CHAIR: A LOT
TURNING FROM BACK TO SIDE WHILE IN FLAT BAD: A LOT
DAILY ACTIVITIY SCORE: 19
TOILETING: A LITTLE
MOBILITY SCORE: 10
WALKING IN HOSPITAL ROOM: TOTAL
MOVING FROM LYING ON BACK TO SITTING ON SIDE OF FLAT BED WITH BEDRAILS: A LOT
CLIMB 3 TO 5 STEPS WITH RAILING: TOTAL
PATIENT BASELINE BEDBOUND: NO
HELP NEEDED FOR BATHING: A LITTLE
STANDING UP FROM CHAIR USING ARMS: A LOT
DRESSING REGULAR LOWER BODY CLOTHING: A LOT

## 2024-07-04 ASSESSMENT — ACTIVITIES OF DAILY LIVING (ADL)
ADEQUATE_TO_COMPLETE_ADL: YES
WALKS IN HOME: INDEPENDENT
HEARING - LEFT EAR: FUNCTIONAL
BATHING: INDEPENDENT
LACK_OF_TRANSPORTATION: NO
FEEDING YOURSELF: INDEPENDENT
GROOMING: INDEPENDENT
JUDGMENT_ADEQUATE_SAFELY_COMPLETE_DAILY_ACTIVITIES: YES
ASSISTIVE_DEVICE: EYEGLASSES;DENTURES UPPER;DENTURES LOWER
TOILETING: INDEPENDENT
HEARING - RIGHT EAR: FUNCTIONAL
PATIENT'S MEMORY ADEQUATE TO SAFELY COMPLETE DAILY ACTIVITIES?: YES
DRESSING YOURSELF: INDEPENDENT

## 2024-07-04 ASSESSMENT — PATIENT HEALTH QUESTIONNAIRE - PHQ9
SUM OF ALL RESPONSES TO PHQ9 QUESTIONS 1 & 2: 0
1. LITTLE INTEREST OR PLEASURE IN DOING THINGS: NOT AT ALL
2. FEELING DOWN, DEPRESSED OR HOPELESS: NOT AT ALL

## 2024-07-04 ASSESSMENT — PAIN SCALES - GENERAL
PAINLEVEL_OUTOF10: 10 - WORST POSSIBLE PAIN
PAINLEVEL_OUTOF10: 3
PAINLEVEL_OUTOF10: 3
PAINLEVEL_OUTOF10: 10 - WORST POSSIBLE PAIN
PAINLEVEL_OUTOF10: 1
PAINLEVEL_OUTOF10: 0 - NO PAIN

## 2024-07-04 ASSESSMENT — LIFESTYLE VARIABLES
AUDIT-C TOTAL SCORE: 0
PRESCIPTION_ABUSE_PAST_12_MONTHS: NO
HOW OFTEN DO YOU HAVE A DRINK CONTAINING ALCOHOL: NEVER
SUBSTANCE_ABUSE_PAST_12_MONTHS: NO
SKIP TO QUESTIONS 9-10: 1
HOW OFTEN DO YOU HAVE 6 OR MORE DRINKS ON ONE OCCASION: NEVER
HOW MANY STANDARD DRINKS CONTAINING ALCOHOL DO YOU HAVE ON A TYPICAL DAY: PATIENT DOES NOT DRINK
AUDIT-C TOTAL SCORE: 0

## 2024-07-04 ASSESSMENT — PAIN DESCRIPTION - LOCATION: LOCATION: HIP

## 2024-07-04 ASSESSMENT — COLUMBIA-SUICIDE SEVERITY RATING SCALE - C-SSRS
1. IN THE PAST MONTH, HAVE YOU WISHED YOU WERE DEAD OR WISHED YOU COULD GO TO SLEEP AND NOT WAKE UP?: NO
6. HAVE YOU EVER DONE ANYTHING, STARTED TO DO ANYTHING, OR PREPARED TO DO ANYTHING TO END YOUR LIFE?: NO
2. HAVE YOU ACTUALLY HAD ANY THOUGHTS OF KILLING YOURSELF?: NO

## 2024-07-04 ASSESSMENT — PAIN - FUNCTIONAL ASSESSMENT
PAIN_FUNCTIONAL_ASSESSMENT: 0-10
PAIN_FUNCTIONAL_ASSESSMENT: 0-10

## 2024-07-04 ASSESSMENT — PAIN DESCRIPTION - ORIENTATION: ORIENTATION: RIGHT

## 2024-07-04 NOTE — CONSULTS
Orthopaedic Surgery Consult H&P    HPI:   Orthopaedic Problems/Injuries: R basicervical FNF  Other Injuries: none    89 y.o. female PMH NSTEMI, complete heart block s/p pacemaker, AAA, HF, pancreatic mass s/p whipple) tripped on a grocery bag 6/29. Eval in ED on 6/30 w neg XR. re-presented to ED today with worsening R hip pain and inability to ambulate. Denies numbness, tingling, and open wounds on the affected limb.     PMH: per above/EMR  PSH: per above/EMR  SocHx:      -  Denies tobacco use, former smoker      -  Denies EtOH use      -  Denies other drug use  FamHx:  Non-contributory to this patient's acute orthopaedic problem.   Allergies: Reviewed in EMR  Meds: Reviewed in EMR    ROS      - 14 point ROS negative except as above    Physical Exam:  Gen: AOx3, NAD  HEENT: normocephalic atraumatic  Psych: appropriate mood and affect  Resp: nonlabored breathing  Cardiac: Extremities WWP, RRR to peripheral palpation  Neuro: CN 2-12 grossly intact  Skin: no rashes    Right lower extremity:  - Skin intact   - Tender to palpation over R greater troch  - Fires EHL/DF/PF.  - Sensation intact to light touch in sural, saphenous, superficial/deep peroneal, tibial nerve distributions.  - 2+ DP pulse, < 2 seconds capillary refill.    A full secondary exam was performed and all relevant findings discussed and noted above.    Imaging:  AP and lateral radiographs of the AP pelvis, R hip/femur display right displaced basicervical femoral neck fracture.    CT pelvis displays above.    Assessment:  Orthopaedic Problems/Injuries: R basicervical femoral neck fracture.    C/p R hip CMN. Clearance pending medicine/cards workup.    Plan:  - admit to medicine  - surgical optimization pending, appreciate documentation of clearance when medically able  - WB: NWB RLE, strict bedrest  - Abx: periop ancef  - Diet: NPO  - DVT: okay for DVT ppx per primary  - Please obtain all preop labs (CBC,BMP,EKG, CXR, Coags, Type and Screen)  - Mcintosh:  please place high in setting of immobilizing hip fracture    - Dispo: pending OR    Anita Bryant, DO  PGY-3 Orthopaedic Surgery  On-call Resident  _________________________________________________________    While admitted, this patient will be followed by the S&E Team. Please reach out to orthopaedic on-call RAJ first with any questions or concerns. Please contact below residents with any questions (available via Epic Chat).     Joints Team: Joseluis Waller, PGY1; Marcos Ambrosio, PGY2; Bartolo Scott, PGY4    Sports Team: Anita Bryant, PGY3; Sia Norton, PGY5    Community Team: Pravin Keith, PGY3;  Hugh Hodges, PGY5    Shoulder/Elbow Team:  Raad Montgomery, PGY4    Foot/Ankle Team: Cristian Looney, PGY3    On weekends and after 6PM:  At [facility]: Please reach out to the orthopaedic on-call resident ([phone])  At [facility]: Please reach out to the orthopaedic on-call RAJ or resident (please refer to Rajesh)

## 2024-07-04 NOTE — ED TRIAGE NOTES
PT TO ED VIA EMS AFTER FALLING IN A STANDING POSITION ON SATURDAY. PT WAS SEEN ON SUNDAY AND SCANS WERE ALL NORMAL. PT STATES THAT HER RIGHT LEG STILL HURTS AND SHE IS UNABLE TO BEAR WEIGHT. PT DENIES BT USE.

## 2024-07-04 NOTE — PROGRESS NOTES
Pharmacy Medication History Review   Spoke to the patients daughter    Maci Catalan is a 89 y.o. female admitted for Closed right hip fracture, initial encounter (Multi). Pharmacy reviewed the patient's [id number] medications and allergies for accuracy.    The list below reflectives the updated PTA list. Please review each medication in order reconciliation for additional clarification and justification.     Prior to Admission Medications   Prescriptions Last Dose Informant   HYDROcodone-acetaminophen (Norco) 5-325 mg tablet     Sig: Take 1 tablet by mouth every 6 hours if needed for severe pain (7 - 10). Rx 3 day supply on 7/3/24   acetaminophen (TylenoL) 325 mg tablet     Sig: Take 2 tablets (650 mg) by mouth every 6 hours if needed for mild pain (1 - 3) for up to 14 days.   fluticasone (Flonase) 50 mcg/actuation nasal spray 7/3/2024    Sig: Administer 1 spray into each nostril once daily. Shake gently. Before first use, prime pump. After use, clean tip and replace cap.   furosemide (Lasix) 40 mg tablet 7/3/2024    Sig: Take 1 tablet (40 mg) by mouth once daily.   lidocaine (Lidoderm) 5 % patch     Sig: Place 1 patch over 12 hours on the skin once daily for 7 days. Remove & discard patch within 12 hours or as directed by MD.   lisinopril 2.5 mg tablet 7/3/2024    Sig: Take 1 tablet (2.5 mg) by mouth once daily.   melatonin 10 mg tablet     Sig: Take 1 tablet (10 mg) by mouth once daily at bedtime.   metFORMIN (Glucophage) 500 mg tablet 7/3/2024    Sig: Take 1 tablet (500 mg) by mouth every 12 hours. With food   methocarbamol (Robaxin) 500 mg tablet     Sig: Take 1 tablet (500 mg) by mouth 4 times a day for 7 days. As needed for pain   metoprolol tartrate (Lopressor) 25 mg tablet 7/3/2024    Sig: Take 1 tablet (25 mg) by mouth 2 times a day.   nitroglycerin (Nitrostat) 0.4 mg SL tablet     Sig: Place 1 tablet (0.4 mg) under the tongue every 5 minutes if needed for chest pain (UP TO 3 DOSES AS NEEDED  FOR CHEST PAIN.CALL 911 IF PAIN PERSISTS.).   omeprazole (PriLOSEC) 40 mg DR capsule 7/3/2024    Sig: Take 1 capsule (40 mg) by mouth 2 times a day.   sucralfate (Carafate) 1 gram tablet 7/3/2024    Sig: Take 1 tablet (1 g) by mouth 4 times a day.      Facility-Administered Medications: None       The list below reflectives the updated allergy list. Please review each documented allergy for additional clarification and justification.  Allergies  Reviewed by Johana Thakur RN on 7/4/2024        Severity Reactions Comments    Aspirin Not Specified GI bleeding     Codeine Not Specified Other syncope    Hydrocortisone Not Specified Itching             Below are additional concerns with the patient's PTA list.      Puja Gan

## 2024-07-04 NOTE — ED PROVIDER NOTES
HPI   Chief Complaint   Patient presents with    Leg Pain       89y old female complains of pain in the R hip. She fell 7 days ago. Was seen at [facility] and had negative plain films and was able to ambulate so was discharged home. Continues to have pain and feel the hip is not healing. Returns today because she had worse pain and cannot now ambulate. She denies other complaints.                         Patten Coma Scale Score: 15                     Patient History   Past Medical History:   Diagnosis Date    Personal history of other diseases of the circulatory system     History of hypertension    Personal history of other endocrine, nutritional and metabolic disease     History of high cholesterol     Past Surgical History:   Procedure Laterality Date    CHOLECYSTECTOMY  06/19/2018    Cholecystectomy    CT ABDOMEN PELVIS ANGIOGRAM W AND/OR WO IV CONTRAST  8/16/2019    CT ABDOMEN PELVIS ANGIOGRAM W AND/OR WO IV CONTRAST 8/16/2019 [facility] [place] [facility]    CT ABDOMEN PELVIS ANGIOGRAM W AND/OR WO IV CONTRAST  10/16/2022    CT ABDOMEN PELVIS ANGIOGRAM W AND/OR WO IV CONTRAST 10/16/2022 [place] [facility]    CT AORTA AND BILATERAL ILIOFEMORAL RUNOFF ANGIOGRAM W AND/OR WO IV CONTRAST  11/7/2019    CT AORTA AND BILATERAL ILIOFEMORAL RUNOFF ANGIOGRAM W AND/OR WO IV CONTRAST 11/7/2019 [facility] [place] [facility]    HYSTERECTOMY  04/17/2018    Hysterectomy    IR ANGIOGRAM AORTA ABDOMEN  11/6/2019    IR ANGIOGRAM AORTA ABDOMEN 11/6/2019 [facility] [place] [facility]    OTHER SURGICAL HISTORY  06/19/2018    Proximal Subtotal Pancreatectomy ([facility] Procedure)    OTHER SURGICAL HISTORY  06/21/2019    Pacemaker insertion    OTHER SURGICAL HISTORY  06/21/2019    Appendectomy laparoscopic     Family History   Problem Relation Name Age of Onset    Hyperlipidemia Mother      Hyperlipidemia Father       Social History     Tobacco Use    Smoking status: Former     Current packs/day: 0.00     Types: Cigarettes     Quit date: 2017     Years since  quittin.5     Passive exposure: Past    Smokeless tobacco: Never   Substance Use Topics    Alcohol use: Never    Drug use: Never       Physical Exam   ED Triage Vitals   Temperature Heart Rate Respirations BP   24 0822 24 0822 24 0822 24 0822   36.6 °C (97.9 °F) 98 18 (!) 166/104      Pulse Ox Temp Source Heart Rate Source Patient Position   24 0822 24 0822 24 0822 24 0822   94 % Temporal Monitor Lying      BP Location FiO2 (%)     24 0822 24 1115     Right arm 32 %       Physical Exam  Vitals reviewed.   Constitutional:       General: She is not in acute distress.     Appearance: Normal appearance. She is normal weight. She is not ill-appearing, toxic-appearing or diaphoretic.   HENT:      Head: Normocephalic and atraumatic.      Right Ear: External ear normal.      Left Ear: External ear normal.      Nose: Nose normal.      Mouth/Throat:      Pharynx: No oropharyngeal exudate.   Eyes:      Extraocular Movements: Extraocular movements intact.      Conjunctiva/sclera: Conjunctivae normal.      Pupils: Pupils are equal, round, and reactive to light.   Pulmonary:      Effort: Pulmonary effort is normal. No respiratory distress.      Breath sounds: No stridor.   Abdominal:      General: There is no distension.   Musculoskeletal:         General: Tenderness present. No swelling or deformity.      Cervical back: Normal range of motion.      Comments: R hip tender, unable to frog leg. NV intact.      Skin:     General: Skin is warm.      Capillary Refill: Capillary refill takes less than 2 seconds.      Findings: No rash.   Neurological:      General: No focal deficit present.      Mental Status: She is alert and oriented to person, place, and time. Mental status is at baseline.   Psychiatric:         Mood and Affect: Mood normal.         Behavior: Behavior normal.         Thought Content: Thought content normal.         Judgment: Judgment normal.         ED  Course & MDM   Diagnoses as of 07/04/24 1544   Closed right hip fracture, initial encounter (Multi)       Medical Decision Making  MEDICAL DECISION MAKING   Number and Complexity of Problems  Differential Diagnosis: fracture, sprain, occult fracture    MDM Data  External documents reviewed:  My EKG interpretation: 82 bpm atrial paced  My CT interpretation: + fem neck intertroch fx  Discussed with: Simeon Lopez, and ortho resident who requests cards consult for clearance.     Treatment and Disposition  Labs Reviewed  CBC WITH AUTO DIFFERENTIAL - Abnormal     WBC                           10.9                   nRBC                          0.0                    RBC                           4.56                   Hemoglobin                    10.4 (*)               Hematocrit                    34.6 (*)               MCV                           76 (*)                 MCH                           22.8 (*)               MCHC                          30.1 (*)               RDW                           16.4 (*)               Platelets                     211                    Neutrophils %                 88.9                   Immature Granulocytes %, Automated   0.8                    Lymphocytes %                 4.7                    Monocytes %                   4.9                    Eosinophils %                 0.3                    Basophils %                   0.4                    Neutrophils Absolute          9.69 (*)               Immature Granulocytes Absolute, Au*   0.09                   Lymphocytes Absolute          0.51 (*)               Monocytes Absolute            0.53                   Eosinophils Absolute          0.03                   Basophils Absolute            0.04                BASIC METABOLIC PANEL - Abnormal     Glucose                       199 (*)                Sodium                        137                    Potassium                     3.5                     Chloride                      104                    Bicarbonate                   18 (*)                 Anion Gap                     19                     Urea Nitrogen                 57 (*)                 Creatinine                    2.84 (*)               eGFR                          15 (*)                 Calcium                       8.6                 PROTIME-INR - Normal     Protime                       11.7                   INR                           1.0                 APTT - Normal     aPTT                          29                       Narrative: The APTT is no longer used for monitoring Unfractionated Heparin Therapy. For monitoring Heparin Therapy, use the Heparin Assay.  TYPE AND SCREEN     ABO TYPE                      AB                     Rh TYPE                       POS                    ANTIBODY SCREEN               POS                 BB ORDER ONLY - ANTIBODY IDENTIFICATION     Antibody ID                   Anti-K                 CASE #                                            BASIC METABOLIC PANEL  CBC  URINALYSIS WITH REFLEX MICROSCOPIC   XR femur right 2+ views   Final Result    Fracture the proximal right femur.    Signed by Diony Amato MD     XR hip right with pelvis when performed 2 or 3 views   Final Result    Fracture the proximal right femur.    Signed by Diony Amato MD     XR knee right 4+ views   Final Result    No definite acute findings. See discussion above.                MACRO:    None          Signed by: Joseph Schoenberger 7/4/2024 11:07 AM    Dictation workstation:   [id number]     XR chest 1 view   Final Result    No evidence consolidating infiltrate or effusion.    Signed by Diony Amato MD     CT pelvis wo IV contrast   Final Result    Intertrochanteric fracture of the proximal right femur with coxa vera    deformity.    Signed by Diony Amato MD     CT femur right wo IV contrast   Final Result    Intertrochanteric fracture of the proximal right femur  with coxa vera    deformity.    Signed by Diony Amato MD      Medications  morphine injection 4 mg (4 mg intravenous Given 7/4/24 1313)  acetaminophen (Tylenol) tablet 650 mg (has no administration in time range)  fluticasone (Flonase) nasal spray 1 spray (has no administration in time range)  furosemide (Lasix) tablet 40 mg (has no administration in time range)  lisinopril tablet 2.5 mg (has no administration in time range)  melatonin tablet 10 mg (has no administration in time range)  metFORMIN (Glucophage) tablet 500 mg (has no administration in time range)  methocarbamol (Robaxin) tablet 500 mg (has no administration in time range)  metoprolol tartrate (Lopressor) tablet 25 mg (has no administration in time range)  nitroglycerin (Nitrostat) SL tablet 0.4 mg (has no administration in time range)  pantoprazole (ProtoNix) EC tablet 40 mg (has no administration in time range)  sucralfate (Carafate) tablet 1 g (has no administration in time range)  lactated Ringer's infusion (has no administration in time range)  acetaminophen (Tylenol) tablet 650 mg (has no administration in time range)    Or  acetaminophen (Tylenol) oral liquid 650 mg (has no administration in time range)    Or  acetaminophen (Tylenol) suppository 650 mg (has no administration in time range)  ondansetron (Zofran) tablet 4 mg (has no administration in time range)    Or  ondansetron (Zofran) injection 4 mg (has no administration in time range)  melatonin tablet 3 mg (has no administration in time range)  morphine injection 4 mg (4 mg intravenous Given 7/4/24 0905)  ondansetron (Zofran) injection 4 mg (4 mg intravenous Given 7/4/24 0905)     ED Course: see above, admitted for further care. Morphine for pain, screening labs, npo for surgery possibly today  Was admission considered?: yes        Procedure  Procedures     João Toussaint PA-C  07/04/24 3777       João Toussaint PA-C  07/04/24 1811

## 2024-07-04 NOTE — TREATMENT PLAN
Patient still pending clearance.     Plan for OR tomorrow 7/5 with Dr. Barton or Dr. Zambrano.  Okay for diet today      Anita Bryant, DO

## 2024-07-04 NOTE — CONSULTS
Inpatient consult to Cardiology  Consult performed by: Matthew ROWLEY MD  Consult ordered by: Jãoo Toussaint PA-C  Reason for consult: Preoperative risk stratification        History Of Present Illness:    Maci Catalan is a 89 y.o. female presenting with past medical history of Perioperative NSTEMI ( Occuring in setting of EVAR 11/2019, Troponin Peak 18.45 ) three-vessel coronary artery disease S/P PCI (11/22/2019) to proximal LAD with a 2.75 x 12 mm Resolute [place] drug-eluting stent postdilated up to 3.5 mm ) Chronic Systolic Heart Failure with LVEF 35% ( 11/2019 ) with Mid and apical anterior septum, mid and apical inferior septum, and basal and mid inferior wall motion abnormalities, HX of AAA S/P EVAR 11/2019 ) Hx of Complete Heart Block S/P Medtronic Dual Chamber System, and Hx of Pancreatic Tumor S/P Whipple, preserved ejection fraction 55% with regional wall motion abnormalities, left-ventricular hypertrophy, slightly elevated RVSP with small left ventricular cavity and echocardiogram from 1/6/2021 presenting with hip fracture.  Cardiology consulted for preoperative risk stratification.     A full hospital course review was completed.  The patient tripped on a grocery bag 6/29. Eval in ED on 6/30 w neg XR. re-presented to ED with worsening R hip pain and inability to ambulate. Denies numbness, tingling, and open wounds on the affected limb.       No exacerbating or relieving factors.  Patient denies chest pain and angina.  Pt denies shortness of breath, dyspnea on exertion, orthopnea, and paroxysmal nocturnal dyspnea.  Pt denies worsening lower extremity edema.  Pt denies palpitations or syncope.  No recent falls.  No fever or chills.  No cough.  No change in bowel or bladder habits.  No sick contacts.  No recent travel.      Last Recorded Vitals:  Vitals:    07/04/24 1100 07/04/24 1115 07/04/24 1130 07/04/24 1230   BP: 136/64 144/89 144/60 124/58   BP Location:       Patient Position:       Pulse: 79  79 69 73   Resp: 15 17 13 16   Temp:       TempSrc:       SpO2: 98% 98% 100% 100%       Last Labs:  CBC - 7/4/2024:  9:10 AM  10.9 10.4 211    34.6      CMP - 7/4/2024:  9:10 AM  8.6 7.1 14 --- 0.3   _ 4.2 11 87      PTT - 7/4/2024: 10:40 AM  1.0   11.7 29     Troponin I, High Sensitivity   Date/Time Value Ref Range Status   03/25/2024 12:05 PM 21 (H) 0 - 13 ng/L Final     Troponin I   Date/Time Value Ref Range Status   01/31/2023 04:52 PM 21 (H) 0 - 13 ng/L Final     Comment:     .  Less than 99th percentile of normal range cutoff-  Female and children under 18 years old <14 ng/L; Male <21 ng/L: Negative  Repeat testing should be performed if clinically indicated.   .  Female and children under 18 years old 14-50 ng/L; Male 21-50 ng/L:  Consistent with possible cardiac damage and possible increased clinical   risk. Serial measurements may help to assess extent of myocardial damage.   .  >50 ng/L: Consistent with cardiac damage, increased clinical risk and  myocardial infarction. Serial measurements may help assess extent of   myocardial damage.   .   NOTE: Children less than 1 year old may have higher baseline troponin   levels and results should be interpreted in conjunction with the overall   clinical context.   .  NOTE: Troponin I testing is performed using a different   testing methodology at [facility] than at other   [facility]. Direct result comparisons should only   be made within the same method.       BNP   Date/Time Value Ref Range Status   03/25/2024 12:05  (H) 0 - 99 pg/mL Final   11/01/2022 01:07  (H) 0 - 99 pg/mL Final     Comment:     .  <100 pg/mL - Heart failure unlikely  100-299 pg/mL - Intermediate probability of acute heart  .               failure exacerbation. Correlate with clinical  .               context and patient history.    >=300 pg/mL - Heart Failure likely. Correlate with clinical  .               context and patient history.   Biotin interference may  cause falsely decreased results.   Patients taking a Biotin dose of up to 5 mg/day should   refrain from taking Biotin for 24 hours before sample   collection. Providers may contact their local laboratory   for further information.     06/14/2022 11:53  (H) 0 - 99 pg/mL Final     Comment:     .  <100 pg/mL - Heart failure unlikely  100-299 pg/mL - Intermediate probability of acute heart  .               failure exacerbation. Correlate with clinical  .               context and patient history.    >=300 pg/mL - Heart Failure likely. Correlate with clinical  .               context and patient history.  BNP testing is performed using different testing   methodology at [facility] than at other   [facility]. Direct result comparisons should   only be made within the same method.       Hemoglobin A1C   Date/Time Value Ref Range Status   03/25/2024 12:05 PM 8.2 (H) see below % Final   06/14/2022 11:49 AM 7.4 (A) % Final     Comment:          Diagnosis of Diabetes-Adults   Non-Diabetic: < or = 5.6%   Increased risk for developing diabetes: 5.7-6.4%   Diagnostic of diabetes: > or = 6.5%  .       Monitoring of Diabetes                Age (y)     Therapeutic Goal (%)   Adults:          >18           <7.0   Pediatrics:    13-18           <7.5                   7-12           <8.0                   0- 6            7.5-8.5   American Diabetes Association. Diabetes Care 33(S1), Jan 2010.     02/02/2022 10:08 AM 5.4 % Final     Comment:          Diagnosis of Diabetes-Adults   Non-Diabetic: < or = 5.6%   Increased risk for developing diabetes: 5.7-6.4%   Diagnostic of diabetes: > or = 6.5%  .       Monitoring of Diabetes                Age (y)     Therapeutic Goal (%)   Adults:          >18           <7.0   Pediatrics:    13-18           <7.5                   7-12           <8.0                   0- 6            7.5-8.5   American Diabetes Association. Diabetes Care 33(S1), Jan 2010.     06/01/2021 10:41  "AM 7.4 (H) 4.0 - 5.6 % Final   03/02/2021 10:42 AM 8.4 (H) 4.0 - 5.6 % Final     LDL Calculated   Date/Time Value Ref Range Status   03/25/2024 12:05  (H) <=99 mg/dL Final     Comment:                                 Near   Borderline      AGE      Desirable  Optimal    High     High     Very High     0-19 Y     0 - 109     ---    110-129   >/= 130     ----    20-24 Y     0 - 119     ---    120-159   >/= 160     ----      >24 Y     0 -  99   100-129  130-159   160-189     >/=190       VLDL   Date/Time Value Ref Range Status   03/25/2024 12:05 PM 47 (H) 0 - 40 mg/dL Final   11/10/2021 11:00 AM 38 0 - 40 mg/dL Final   01/11/2021 11:06 AM 37 0 - 40 mg/dL Final   11/11/2019 08:30 AM 42 (H) 0 - 40 mg/dL Final      Last I/O:  No intake/output data recorded.    Past Cardiology Tests (Last 3 Years):  EKG:  ECG 12 lead (Clinic Performed) 04/19/2024      ECG 12 lead (Ancillary Performed) 10/10/2023    Echo:  No results found for this or any previous visit from the past 1095 days.    Ejection Fractions:  No results found for: \"EF\"  Cath:  No results found for this or any previous visit from the past 1095 days.    Stress Test:  No results found for this or any previous visit from the past 1095 days.    Cardiac Imaging:  No results found for this or any previous visit from the past 1095 days.      Past Medical History:  She has a past medical history of Personal history of other diseases of the circulatory system and Personal history of other endocrine, nutritional and metabolic disease.    Past Surgical History:  She has a past surgical history that includes Hysterectomy (04/17/2018); Other surgical history (06/19/2018); Cholecystectomy (06/19/2018); Other surgical history (06/21/2019); Other surgical history (06/21/2019); CT angio abdomen pelvis w and or wo IV IV contrast (8/16/2019); CT angio aorta and bilateral iliofemoral runoff w and or wo IV contrast (11/7/2019); IR angiogram aorta abdomen (11/6/2019); and CT angio " abdomen pelvis w and or wo IV IV contrast (10/16/2022).      Social History:  She reports that she quit smoking about 7 years ago. Her smoking use included cigarettes. She has been exposed to tobacco smoke. She has never used smokeless tobacco. She reports that she does not drink alcohol and does not use drugs.    Family History:  Family History   Problem Relation Name Age of Onset    Hyperlipidemia Mother      Hyperlipidemia Father          Allergies:  Aspirin, Codeine, and Hydrocortisone    Inpatient Medications:  Scheduled medications   Medication Dose Route Frequency     PRN medications   Medication    morphine     Continuous Medications   Medication Dose Last Rate     Outpatient Medications:  Current Outpatient Medications   Medication Instructions    acetaminophen (TYLENOL) 650 mg, oral, Every 6 hours PRN    fluticasone (Flonase) 50 mcg/actuation nasal spray 1 spray, Each Nostril, Daily, Shake gently. Before first use, prime pump. After use, clean tip and replace cap.    furosemide (LASIX) 40 mg, oral, Daily    HYDROcodone-acetaminophen (Norco) 5-325 mg tablet 1 tablet, oral, Every 6 hours PRN, Rx 3 day supply on 7/3/24    lidocaine (Lidoderm) 5 % patch 1 patch, transdermal, Daily, Remove & discard patch within 12 hours or as directed by MD.    lisinopril 2.5 mg, oral, Daily    melatonin 10 mg, oral, Nightly    metFORMIN (Glucophage) 500 mg tablet 1 tablet, oral, Every 12 hours, With food     methocarbamol (ROBAXIN) 500 mg, oral, 4 times daily, As needed for pain    metoprolol tartrate (LOPRESSOR) 25 mg, oral, 2 times daily    nitroglycerin (Nitrostat) 0.4 mg SL tablet 1 tablet, sublingual, Every 5 min PRN    omeprazole (PriLOSEC) 40 mg DR capsule 1 capsule, oral, 2 times daily (0900,1400)    sucralfate (Carafate) 1 gram tablet 1 tablet, oral, 4 times daily       Physical Exam:  General:  Patient is awake, alert, and oriented.  Patient is in no acute distress.  HEENT:  Pupils equal and reactive.   Normocephalic.  Moist mucosa.    Neck:  No thyromegaly.  Normal Jugular Venous Pressure.  Cardiovascular:  Regular rate and rhythm.  Normal S1 and S2.  Pulmonary:  Clear to auscultation bilaterally.  Abdomen:  Soft. Non-tender.   Non-distended.  Positive bowel sounds.  Lower Extremities:  2+ pedal pulses. No LE edema.  Neurologic:  Cranial nerves intact.  No focal deficit.   Skin: Skin warm and dry, normal skin turgor.   Psychiatric: Normal affect.      Assessment/Plan   Maci Catalan is a 89 y.o. female presenting with past medical history of Perioperative NSTEMI ( Occuring in setting of EVAR 11/2019, Troponin Peak 18.45 ) three-vessel coronary artery disease S/P PCI (11/22/2019) to proximal LAD with a 2.75 x 12 mm Resolute [place] drug-eluting stent postdilated up to 3.5 mm ) Chronic Systolic Heart Failure with LVEF 35% ( 11/2019 ) with Mid and apical anterior septum, mid and apical inferior septum, and basal and mid inferior wall motion abnormalities, HX of AAA S/P EVAR 11/2019 ) Hx of Complete Heart Block S/P Medtronic Dual Chamber System, and Hx of Pancreatic Tumor S/P Whipple, preserved ejection fraction 55% with regional wall motion abnormalities, left-ventricular hypertrophy, slightly elevated RVSP with small left ventricular cavity and echocardiogram from 1/6/2021 presenting with hip fracture.  Cardiology consulted for preoperative risk stratification.  No pulmonary edema on chest x-ray.  The patient clinically appears euvolemic.  No signs of active ischemia.    Agree with ongoing supportive management.     The patient is low to intermediate risk for moderate risk surgery and can proceed.    The patient will benefit from follow-up in Cardiology clinic with her primary cardiologist Dr. Sanchez at [facility] as scheduled.    Thank you for allowing me to participate in their care.  Please feel free to call me with any further questions or concerns.        Matthew Alvarez MD, FAC, DAR, [facility]  Division of  Cardiovascular Medicine  System Director, Nuclear Cardiology   Medical Director, [facility] [place], [facility]   Clinical , [employer] [facility] [employer] [facility] [employer]  [email or web address]   Office:  [phone]

## 2024-07-05 ENCOUNTER — APPOINTMENT (OUTPATIENT)
Dept: RADIOLOGY | Facility: HOSPITAL | Age: 89
DRG: 481 | End: 2024-07-05
Payer: MEDICARE

## 2024-07-05 ENCOUNTER — ANESTHESIA EVENT (OUTPATIENT)
Dept: OPERATING ROOM | Facility: HOSPITAL | Age: 89
End: 2024-07-05
Payer: MEDICARE

## 2024-07-05 ENCOUNTER — ANESTHESIA (OUTPATIENT)
Dept: OPERATING ROOM | Facility: HOSPITAL | Age: 89
End: 2024-07-05
Payer: MEDICARE

## 2024-07-05 ENCOUNTER — APPOINTMENT (OUTPATIENT)
Dept: CARDIOLOGY | Facility: HOSPITAL | Age: 89
DRG: 481 | End: 2024-07-05
Payer: MEDICARE

## 2024-07-05 PROBLEM — Z95.0 PACEMAKER: Status: ACTIVE | Noted: 2024-07-05

## 2024-07-05 LAB
ANION GAP SERPL CALC-SCNC: 19 MMOL/L (ref 10–20)
ATRIAL RATE: 82 BPM
BLOOD EXPIRATION DATE: NORMAL
BLOOD EXPIRATION DATE: NORMAL
BUN SERPL-MCNC: 50 MG/DL (ref 6–23)
CALCIUM SERPL-MCNC: 8.3 MG/DL (ref 8.6–10.3)
CHLORIDE SERPL-SCNC: 107 MMOL/L (ref 98–107)
CO2 SERPL-SCNC: 18 MMOL/L (ref 21–32)
CREAT SERPL-MCNC: 2.09 MG/DL (ref 0.5–1.05)
DISPENSE STATUS: NORMAL
DISPENSE STATUS: NORMAL
EGFRCR SERPLBLD CKD-EPI 2021: 22 ML/MIN/1.73M*2
ERYTHROCYTE [DISTWIDTH] IN BLOOD BY AUTOMATED COUNT: 16.7 % (ref 11.5–14.5)
GLUCOSE BLD MANUAL STRIP-MCNC: 124 MG/DL (ref 74–99)
GLUCOSE BLD MANUAL STRIP-MCNC: 91 MG/DL (ref 74–99)
GLUCOSE SERPL-MCNC: 105 MG/DL (ref 74–99)
HCT VFR BLD AUTO: 33.7 % (ref 36–46)
HGB BLD-MCNC: 9.7 G/DL (ref 12–16)
MCH RBC QN AUTO: 22.8 PG (ref 26–34)
MCHC RBC AUTO-ENTMCNC: 28.8 G/DL (ref 32–36)
MCV RBC AUTO: 79 FL (ref 80–100)
NRBC BLD-RTO: 0 /100 WBCS (ref 0–0)
P AXIS: 63 DEGREES
P OFFSET: 180 MS
P ONSET: 123 MS
PLATELET # BLD AUTO: 229 X10*3/UL (ref 150–450)
POTASSIUM SERPL-SCNC: 3.5 MMOL/L (ref 3.5–5.3)
PR INTERVAL: 182 MS
PRODUCT BLOOD TYPE: 6200
PRODUCT BLOOD TYPE: 8400
PRODUCT CODE: NORMAL
PRODUCT CODE: NORMAL
Q ONSET: 214 MS
QRS COUNT: 13 BEATS
QRS DURATION: 124 MS
QT INTERVAL: 406 MS
QTC CALCULATION(BAZETT): 474 MS
QTC FREDERICIA: 450 MS
R AXIS: 4 DEGREES
RBC # BLD AUTO: 4.26 X10*6/UL (ref 4–5.2)
SODIUM SERPL-SCNC: 140 MMOL/L (ref 136–145)
T AXIS: 142 DEGREES
T OFFSET: 417 MS
UNIT ABO: NORMAL
UNIT ABO: NORMAL
UNIT NUMBER: NORMAL
UNIT NUMBER: NORMAL
UNIT RH: NORMAL
UNIT RH: NORMAL
UNIT VOLUME: 350
UNIT VOLUME: 350
VENTRICULAR RATE: 82 BPM
WBC # BLD AUTO: 6.3 X10*3/UL (ref 4.4–11.3)
XM INTEP: NORMAL
XM INTEP: NORMAL

## 2024-07-05 PROCEDURE — C1713 ANCHOR/SCREW BN/BN,TIS/BN: HCPCS | Performed by: STUDENT IN AN ORGANIZED HEALTH CARE EDUCATION/TRAINING PROGRAM

## 2024-07-05 PROCEDURE — 3600000009 HC OR TIME - EACH INCREMENTAL 1 MINUTE - PROCEDURE LEVEL FOUR: Performed by: STUDENT IN AN ORGANIZED HEALTH CARE EDUCATION/TRAINING PROGRAM

## 2024-07-05 PROCEDURE — 2500000001 HC RX 250 WO HCPCS SELF ADMINISTERED DRUGS (ALT 637 FOR MEDICARE OP): Performed by: FAMILY MEDICINE

## 2024-07-05 PROCEDURE — 3700000002 HC GENERAL ANESTHESIA TIME - EACH INCREMENTAL 1 MINUTE: Performed by: STUDENT IN AN ORGANIZED HEALTH CARE EDUCATION/TRAINING PROGRAM

## 2024-07-05 PROCEDURE — 2500000004 HC RX 250 GENERAL PHARMACY W/ HCPCS (ALT 636 FOR OP/ED): Performed by: ANESTHESIOLOGIST ASSISTANT

## 2024-07-05 PROCEDURE — 2500000001 HC RX 250 WO HCPCS SELF ADMINISTERED DRUGS (ALT 637 FOR MEDICARE OP): Performed by: PHYSICAL THERAPIST

## 2024-07-05 PROCEDURE — 2720000007 HC OR 272 NO HCPCS: Performed by: STUDENT IN AN ORGANIZED HEALTH CARE EDUCATION/TRAINING PROGRAM

## 2024-07-05 PROCEDURE — 2780000003 HC OR 278 NO HCPCS: Performed by: STUDENT IN AN ORGANIZED HEALTH CARE EDUCATION/TRAINING PROGRAM

## 2024-07-05 PROCEDURE — 3700000001 HC GENERAL ANESTHESIA TIME - INITIAL BASE CHARGE: Performed by: STUDENT IN AN ORGANIZED HEALTH CARE EDUCATION/TRAINING PROGRAM

## 2024-07-05 PROCEDURE — 85027 COMPLETE CBC AUTOMATED: CPT

## 2024-07-05 PROCEDURE — 3600000004 HC OR TIME - INITIAL BASE CHARGE - PROCEDURE LEVEL FOUR: Performed by: STUDENT IN AN ORGANIZED HEALTH CARE EDUCATION/TRAINING PROGRAM

## 2024-07-05 PROCEDURE — 27245 TREAT THIGH FRACTURE: CPT | Performed by: STUDENT IN AN ORGANIZED HEALTH CARE EDUCATION/TRAINING PROGRAM

## 2024-07-05 PROCEDURE — A4649 SURGICAL SUPPLIES: HCPCS | Performed by: STUDENT IN AN ORGANIZED HEALTH CARE EDUCATION/TRAINING PROGRAM

## 2024-07-05 PROCEDURE — 36415 COLL VENOUS BLD VENIPUNCTURE: CPT

## 2024-07-05 PROCEDURE — 2500000005 HC RX 250 GENERAL PHARMACY W/O HCPCS: Performed by: STUDENT IN AN ORGANIZED HEALTH CARE EDUCATION/TRAINING PROGRAM

## 2024-07-05 PROCEDURE — 76000 FLUOROSCOPY <1 HR PHYS/QHP: CPT | Mod: RT

## 2024-07-05 PROCEDURE — 2500000005 HC RX 250 GENERAL PHARMACY W/O HCPCS: Performed by: ANESTHESIOLOGY

## 2024-07-05 PROCEDURE — 2060000001 HC INTERMEDIATE ICU ROOM DAILY

## 2024-07-05 PROCEDURE — 7100000002 HC RECOVERY ROOM TIME - EACH INCREMENTAL 1 MINUTE: Performed by: STUDENT IN AN ORGANIZED HEALTH CARE EDUCATION/TRAINING PROGRAM

## 2024-07-05 PROCEDURE — 2500000004 HC RX 250 GENERAL PHARMACY W/ HCPCS (ALT 636 FOR OP/ED): Performed by: NURSE PRACTITIONER

## 2024-07-05 PROCEDURE — 7100000001 HC RECOVERY ROOM TIME - INITIAL BASE CHARGE: Performed by: STUDENT IN AN ORGANIZED HEALTH CARE EDUCATION/TRAINING PROGRAM

## 2024-07-05 PROCEDURE — 2500000005 HC RX 250 GENERAL PHARMACY W/O HCPCS: Performed by: ANESTHESIOLOGIST ASSISTANT

## 2024-07-05 PROCEDURE — 82947 ASSAY GLUCOSE BLOOD QUANT: CPT

## 2024-07-05 PROCEDURE — 2500000004 HC RX 250 GENERAL PHARMACY W/ HCPCS (ALT 636 FOR OP/ED): Performed by: PHYSICIAN ASSISTANT

## 2024-07-05 PROCEDURE — 0QS606Z REPOSITION RIGHT UPPER FEMUR WITH INTRAMEDULLARY INTERNAL FIXATION DEVICE, OPEN APPROACH: ICD-10-PCS | Performed by: STUDENT IN AN ORGANIZED HEALTH CARE EDUCATION/TRAINING PROGRAM

## 2024-07-05 PROCEDURE — 80048 BASIC METABOLIC PNL TOTAL CA: CPT

## 2024-07-05 DEVICE — IMPLANTABLE DEVICE: Type: IMPLANTABLE DEVICE | Site: HIP | Status: FUNCTIONAL

## 2024-07-05 DEVICE — TROCH NAIL, GAM3 125D 11X180MM W/TI SET SCRW: Type: IMPLANTABLE DEVICE | Site: HIP | Status: FUNCTIONAL

## 2024-07-05 DEVICE — IMPLANTABLE DEVICE: Type: IMPLANTABLE DEVICE | Site: HIP | Status: NON-FUNCTIONAL

## 2024-07-05 RX ORDER — LIDOCAINE HYDROCHLORIDE 10 MG/ML
0.1 INJECTION, SOLUTION EPIDURAL; INFILTRATION; INTRACAUDAL; PERINEURAL ONCE
Status: DISCONTINUED | OUTPATIENT
Start: 2024-07-05 | End: 2024-07-05 | Stop reason: HOSPADM

## 2024-07-05 RX ORDER — PROPOFOL 10 MG/ML
INJECTION, EMULSION INTRAVENOUS AS NEEDED
Status: DISCONTINUED | OUTPATIENT
Start: 2024-07-05 | End: 2024-07-05

## 2024-07-05 RX ORDER — ONDANSETRON HYDROCHLORIDE 2 MG/ML
4 INJECTION, SOLUTION INTRAVENOUS ONCE AS NEEDED
Status: DISCONTINUED | OUTPATIENT
Start: 2024-07-05 | End: 2024-07-05 | Stop reason: HOSPADM

## 2024-07-05 RX ORDER — TRANEXAMIC ACID 100 MG/ML
INJECTION, SOLUTION INTRAVENOUS AS NEEDED
Status: DISCONTINUED | OUTPATIENT
Start: 2024-07-05 | End: 2024-07-05

## 2024-07-05 RX ORDER — OXYCODONE HYDROCHLORIDE 5 MG/1
5 TABLET ORAL EVERY 4 HOURS PRN
Status: DISCONTINUED | OUTPATIENT
Start: 2024-07-05 | End: 2024-07-05 | Stop reason: HOSPADM

## 2024-07-05 RX ORDER — SODIUM CHLORIDE 0.9 G/100ML
IRRIGANT IRRIGATION AS NEEDED
Status: DISCONTINUED | OUTPATIENT
Start: 2024-07-05 | End: 2024-07-05 | Stop reason: HOSPADM

## 2024-07-05 RX ORDER — SODIUM CHLORIDE, SODIUM LACTATE, POTASSIUM CHLORIDE, CALCIUM CHLORIDE 600; 310; 30; 20 MG/100ML; MG/100ML; MG/100ML; MG/100ML
100 INJECTION, SOLUTION INTRAVENOUS CONTINUOUS
Status: CANCELLED | OUTPATIENT
Start: 2024-07-05

## 2024-07-05 RX ORDER — SODIUM CHLORIDE, SODIUM LACTATE, POTASSIUM CHLORIDE, CALCIUM CHLORIDE 600; 310; 30; 20 MG/100ML; MG/100ML; MG/100ML; MG/100ML
INJECTION, SOLUTION INTRAVENOUS CONTINUOUS PRN
Status: DISCONTINUED | OUTPATIENT
Start: 2024-07-05 | End: 2024-07-05

## 2024-07-05 RX ORDER — HYDROMORPHONE HYDROCHLORIDE 1 MG/ML
INJECTION, SOLUTION INTRAMUSCULAR; INTRAVENOUS; SUBCUTANEOUS AS NEEDED
Status: DISCONTINUED | OUTPATIENT
Start: 2024-07-05 | End: 2024-07-05

## 2024-07-05 RX ORDER — ONDANSETRON HYDROCHLORIDE 2 MG/ML
INJECTION, SOLUTION INTRAVENOUS AS NEEDED
Status: DISCONTINUED | OUTPATIENT
Start: 2024-07-05 | End: 2024-07-05

## 2024-07-05 RX ORDER — CEFAZOLIN 1 G/1
INJECTION, POWDER, FOR SOLUTION INTRAVENOUS AS NEEDED
Status: DISCONTINUED | OUTPATIENT
Start: 2024-07-05 | End: 2024-07-05

## 2024-07-05 RX ORDER — CEFAZOLIN SODIUM 1 G/50ML
1 SOLUTION INTRAVENOUS EVERY 12 HOURS
Status: COMPLETED | OUTPATIENT
Start: 2024-07-06 | End: 2024-07-06

## 2024-07-05 RX ORDER — DEXTROSE MONOHYDRATE AND SODIUM CHLORIDE 5; .9 G/100ML; G/100ML
50 INJECTION, SOLUTION INTRAVENOUS CONTINUOUS
Status: DISCONTINUED | OUTPATIENT
Start: 2024-07-05 | End: 2024-07-10

## 2024-07-05 RX ORDER — LIDOCAINE HYDROCHLORIDE 20 MG/ML
INJECTION, SOLUTION INFILTRATION; PERINEURAL AS NEEDED
Status: DISCONTINUED | OUTPATIENT
Start: 2024-07-05 | End: 2024-07-05

## 2024-07-05 RX ORDER — ROCURONIUM BROMIDE 10 MG/ML
INJECTION, SOLUTION INTRAVENOUS AS NEEDED
Status: DISCONTINUED | OUTPATIENT
Start: 2024-07-05 | End: 2024-07-05

## 2024-07-05 RX ORDER — SODIUM CHLORIDE, SODIUM LACTATE, POTASSIUM CHLORIDE, CALCIUM CHLORIDE 600; 310; 30; 20 MG/100ML; MG/100ML; MG/100ML; MG/100ML
100 INJECTION, SOLUTION INTRAVENOUS CONTINUOUS
Status: DISCONTINUED | OUTPATIENT
Start: 2024-07-05 | End: 2024-07-05 | Stop reason: HOSPADM

## 2024-07-05 RX ORDER — ENOXAPARIN SODIUM 100 MG/ML
30 INJECTION SUBCUTANEOUS EVERY 24 HOURS
Status: DISCONTINUED | OUTPATIENT
Start: 2024-07-05 | End: 2024-07-05

## 2024-07-05 RX ORDER — PHENYLEPHRINE HCL IN 0.9% NACL 1 MG/10 ML
SYRINGE (ML) INTRAVENOUS AS NEEDED
Status: DISCONTINUED | OUTPATIENT
Start: 2024-07-05 | End: 2024-07-05

## 2024-07-05 RX ORDER — FENTANYL CITRATE 50 UG/ML
INJECTION, SOLUTION INTRAMUSCULAR; INTRAVENOUS AS NEEDED
Status: DISCONTINUED | OUTPATIENT
Start: 2024-07-05 | End: 2024-07-05

## 2024-07-05 RX ORDER — HEPARIN SODIUM 5000 [USP'U]/ML
5000 INJECTION, SOLUTION INTRAVENOUS; SUBCUTANEOUS EVERY 8 HOURS SCHEDULED
Status: DISCONTINUED | OUTPATIENT
Start: 2024-07-05 | End: 2024-07-05

## 2024-07-05 RX ORDER — OXYCODONE HYDROCHLORIDE 5 MG/1
10 TABLET ORAL EVERY 4 HOURS PRN
Status: DISCONTINUED | OUTPATIENT
Start: 2024-07-05 | End: 2024-07-05 | Stop reason: HOSPADM

## 2024-07-05 SDOH — HEALTH STABILITY: MENTAL HEALTH: CURRENT SMOKER: 0

## 2024-07-05 ASSESSMENT — PAIN - FUNCTIONAL ASSESSMENT
PAIN_FUNCTIONAL_ASSESSMENT: 0-10

## 2024-07-05 ASSESSMENT — COGNITIVE AND FUNCTIONAL STATUS - GENERAL
MOVING TO AND FROM BED TO CHAIR: A LOT
TURNING FROM BACK TO SIDE WHILE IN FLAT BAD: A LOT
MOVING FROM LYING ON BACK TO SITTING ON SIDE OF FLAT BED WITH BEDRAILS: A LOT
MOVING FROM LYING ON BACK TO SITTING ON SIDE OF FLAT BED WITH BEDRAILS: A LITTLE
TOILETING: A LITTLE
DRESSING REGULAR LOWER BODY CLOTHING: A LOT
WALKING IN HOSPITAL ROOM: TOTAL
CLIMB 3 TO 5 STEPS WITH RAILING: TOTAL
DRESSING REGULAR UPPER BODY CLOTHING: A LITTLE
DAILY ACTIVITIY SCORE: 19
MOVING TO AND FROM BED TO CHAIR: A LITTLE
STANDING UP FROM CHAIR USING ARMS: TOTAL
CLIMB 3 TO 5 STEPS WITH RAILING: TOTAL
DRESSING REGULAR UPPER BODY CLOTHING: A LITTLE
TOILETING: A LITTLE
DRESSING REGULAR LOWER BODY CLOTHING: A LOT
HELP NEEDED FOR BATHING: A LITTLE
WALKING IN HOSPITAL ROOM: TOTAL
MOBILITY SCORE: 12
HELP NEEDED FOR BATHING: A LITTLE
TURNING FROM BACK TO SIDE WHILE IN FLAT BAD: A LITTLE

## 2024-07-05 ASSESSMENT — PAIN SCALES - GENERAL
PAINLEVEL_OUTOF10: 8
PAINLEVEL_OUTOF10: 0 - NO PAIN
PAINLEVEL_OUTOF10: 5 - MODERATE PAIN
PAINLEVEL_OUTOF10: 0 - NO PAIN
PAINLEVEL_OUTOF10: 0 - NO PAIN
PAINLEVEL_OUTOF10: 3
PAINLEVEL_OUTOF10: 3
PAINLEVEL_OUTOF10: 10 - WORST POSSIBLE PAIN
PAINLEVEL_OUTOF10: 5 - MODERATE PAIN
PAINLEVEL_OUTOF10: 0 - NO PAIN

## 2024-07-05 ASSESSMENT — ENCOUNTER SYMPTOMS
MYALGIAS: 1
FATIGUE: 1
ACTIVITY CHANGE: 1
ARTHRALGIAS: 1

## 2024-07-05 NOTE — ANESTHESIA PREPROCEDURE EVALUATION
Patient: Maci Catalan    Procedure Information       Date/Time: 07/05/24 1300    Procedure: Right Hip Fracture ORIF w/ Nail Trochanteric (Right: Hip)    Location: [facility] OR 17 / Virtual [facility] OR    Surgeons: Ozzy Zambrano MD          Vitals:    07/05/24 1222   BP: 129/62   Pulse: 78   Resp: 18   Temp: 37.1 °C (98.8 °F)   SpO2: 96%       Past Surgical History:   Procedure Laterality Date    CHOLECYSTECTOMY  06/19/2018    Cholecystectomy    CT ABDOMEN PELVIS ANGIOGRAM W AND/OR WO IV CONTRAST  8/16/2019    CT ABDOMEN PELVIS ANGIOGRAM W AND/OR WO IV CONTRAST 8/16/2019 [facility] ANCILLARY LEGACY    CT ABDOMEN PELVIS ANGIOGRAM W AND/OR WO IV CONTRAST  10/16/2022    CT ABDOMEN PELVIS ANGIOGRAM W AND/OR WO IV CONTRAST 10/16/2022 [facility] EMERGENCY LEGACY    CT AORTA AND BILATERAL ILIOFEMORAL RUNOFF ANGIOGRAM W AND/OR WO IV CONTRAST  11/7/2019    CT AORTA AND BILATERAL ILIOFEMORAL RUNOFF ANGIOGRAM W AND/OR WO IV CONTRAST 11/7/2019 [facility] INPATIENT LEGACY    HYSTERECTOMY  04/17/2018    Hysterectomy    IR ANGIOGRAM AORTA ABDOMEN  11/6/2019    IR ANGIOGRAM AORTA ABDOMEN 11/6/2019 [facility] INPATIENT LEGACY    OTHER SURGICAL HISTORY  06/19/2018    Proximal Subtotal Pancreatectomy (Whipple Procedure)    OTHER SURGICAL HISTORY  06/21/2019    Pacemaker insertion    OTHER SURGICAL HISTORY  06/21/2019    Appendectomy laparoscopic     Past Medical History:   Diagnosis Date    Personal history of other diseases of the circulatory system     History of hypertension    Personal history of other endocrine, nutritional and metabolic disease     History of high cholesterol       Current Facility-Administered Medications:     [Transfer Hold] acetaminophen (Tylenol) tablet 650 mg, 650 mg, oral, q4h PRN **OR** [Transfer Hold] acetaminophen (Tylenol) oral liquid 650 mg, 650 mg, oral, q4h PRN **OR** [Transfer Hold] acetaminophen (Tylenol) suppository 650 mg, 650 mg, rectal, q4h PRN, Jacky Hendrix MD    [Transfer Hold] acetaminophen (Tylenol) tablet 650 mg,  650 mg, oral, q6h PRN, Jacky Hendrix MD    D5 % and 0.9 % sodium chloride infusion, 50 mL/hr, intravenous, Continuous, KAYDEN Gonzales, Last Rate: 50 mL/hr at 07/05/24 0906, 50 mL/hr at 07/05/24 0906    [Transfer Hold] fluticasone (Flonase) nasal spray 1 spray, 1 spray, Each Nostril, Daily, Jacky Hendrix MD    [Held by provider] furosemide (Lasix) tablet 40 mg, 40 mg, oral, Daily, Jacky Hendrix MD    [Transfer Hold] heparin (porcine) injection 5,000 Units, 5,000 Units, subcutaneous, q8h LISA, KAYDEN Gonzales, 5,000 Units at 07/05/24 0717    [Held by provider] lisinopril tablet 2.5 mg, 2.5 mg, oral, Daily, Jacky Hendrix MD, 2.5 mg at 07/04/24 1807    [Transfer Hold] melatonin tablet 9 mg, 9 mg, oral, Nightly, Jacky Hendrix MD, 9 mg at 07/04/24 2127    [Transfer Hold] methocarbamol (Robaxin) tablet 500 mg, 500 mg, oral, 4x daily, Jacky Hendrix MD, 500 mg at 07/04/24 2128    [Transfer Hold] metoprolol tartrate (Lopressor) tablet 25 mg, 25 mg, oral, BID, Jacky Hendrix MD, 25 mg at 07/05/24 0849    [Transfer Hold] morphine injection 4 mg, 4 mg, intravenous, q4h PRN, João Toussaint PA-C, 4 mg at 07/05/24 0300    [Transfer Hold] nitroglycerin (Nitrostat) SL tablet 0.4 mg, 0.4 mg, sublingual, q5 min PRN, Jacky Hendrix MD    [Transfer Hold] ondansetron (Zofran) tablet 4 mg, 4 mg, oral, q8h PRN **OR** [Transfer Hold] ondansetron (Zofran) injection 4 mg, 4 mg, intravenous, q8h PRN, Jacky Hendrix MD    [Transfer Hold] pantoprazole (ProtoNix) EC tablet 40 mg, 40 mg, oral, Daily before breakfast, Jacky Hendrix MD    [Transfer Hold] sucralfate (Carafate) tablet 1 g, 1 g, oral, 4x daily, Jacky Hendrix MD, 1 g at 07/04/24 2129  Prior to Admission medications    Medication Sig Start Date End Date Taking? Authorizing Provider   fluticasone (Flonase) 50 mcg/actuation nasal spray Administer 1 spray into each nostril once daily. Shake gently. Before first use, prime pump. After use, clean tip and replace cap.   Yes  Historical Provider, MD   furosemide (Lasix) 40 mg tablet Take 1 tablet (40 mg) by mouth once daily. 4/30/24 4/30/25 Yes Ruben Sanchez DO   lisinopril 2.5 mg tablet Take 1 tablet (2.5 mg) by mouth once daily. 4/10/24 2/4/25 Yes Ruben Sanchez DO   metFORMIN (Glucophage) 500 mg tablet Take 1 tablet (500 mg) by mouth every 12 hours. With food 6/9/21  Yes Historical Provider, MD   metoprolol tartrate (Lopressor) 25 mg tablet Take 1 tablet (25 mg) by mouth 2 times a day. 4/10/24  Yes Ruben Sanchez DO   omeprazole (PriLOSEC) 40 mg DR capsule Take 1 capsule (40 mg) by mouth 2 times a day. 10/24/21  Yes Historical Provider, MD   sucralfate (Carafate) 1 gram tablet Take 1 tablet (1 g) by mouth 4 times a day. 5/10/23  Yes Historical Provider, MD   acetaminophen (TylenoL) 325 mg tablet Take 2 tablets (650 mg) by mouth every 6 hours if needed for mild pain (1 - 3) for up to 14 days. 6/30/24 7/14/24  Frida Vivas MD   HYDROcodone-acetaminophen (Norco) 5-325 mg tablet Take 1 tablet by mouth every 6 hours if needed for severe pain (7 - 10). Rx 3 day supply on 7/3/24    Historical Provider, MD   lidocaine (Lidoderm) 5 % patch Place 1 patch over 12 hours on the skin once daily for 7 days. Remove & discard patch within 12 hours or as directed by MD. 6/30/24 7/7/24  Frida Vivas MD   melatonin 10 mg tablet Take 1 tablet (10 mg) by mouth once daily at bedtime.    Historical Provider, MD   methocarbamol (Robaxin) 500 mg tablet Take 1 tablet (500 mg) by mouth 4 times a day for 7 days. As needed for pain 6/30/24 7/7/24  Frida Vivas MD   nitroglycerin (Nitrostat) 0.4 mg SL tablet Place 1 tablet (0.4 mg) under the tongue every 5 minutes if needed for chest pain (UP TO 3 DOSES AS NEEDED FOR CHEST PAIN.CALL 911 IF PAIN PERSISTS.). 9/17/18   Historical Provider, MD   famotidine (Pepcid) 20 mg tablet Take 1 tablet (20 mg) by mouth once daily at bedtime.  7/4/24  Historical Provider, MD     Allergies   Allergen Reactions     Aspirin GI bleeding    Codeine Other     syncope    Hydrocortisone Itching     Social History     Tobacco Use    Smoking status: Former     Current packs/day: 0.00     Types: Cigarettes     Quit date: 2017     Years since quittin.5     Passive exposure: Past    Smokeless tobacco: Never   Substance Use Topics    Alcohol use: Never         Chemistry    Lab Results   Component Value Date/Time     2024 0633    K 3.5 2024 0633     2024 0633    CO2 18 (L) 2024 0633    BUN 50 (H) 2024 0633    CREATININE 2.09 (H) 2024 0633    Lab Results   Component Value Date/Time    CALCIUM 8.3 (L) 2024 0633    ALKPHOS 87 2024 1205    AST 14 2024 1205    ALT 11 2024 1205    BILITOT 0.3 2024 1205          Lab Results   Component Value Date/Time    WBC 6.3 2024 0633    HGB 9.7 (L) 2024 0633    HCT 33.7 (L) 2024 0633     2024 0633     Lab Results   Component Value Date/Time    PROTIME 11.7 2024 1040    INR 1.0 2024 1040     Encounter Date: 24   ECG 12 Lead   Result Value    Ventricular Rate 82    Atrial Rate 82    OH Interval 182    QRS Duration 124    QT Interval 406    QTC Calculation(Bazett) 474    P Axis 63    R Axis 4    T Axis 142    QRS Count 13    Q Onset 214    P Onset 123    P Offset 180    T Offset 417    QTC Fredericia 450    Narrative    Atrial-sensed ventricular-paced rhythm  Abnormal ECG  When compared with ECG of 2024 11:17,  Vent. rate has increased BY   3 BPM     No results found for this or any previous visit from the past 1095 days.        Relevant Problems   Anesthesia (within normal limits)      Cardiac   (+) 3-vessel coronary artery disease (Stents 5 years ago)   (+) Abdominal aortic aneurysm (AAA) (CMS-HCC)   (+) Benign essential HTN   (+) Cardiac chest pain   (+) Chronic systolic congestive heart failure (Multi)   (+) Complete heart block (Multi)   (+) Coronary artery disease  involving native coronary artery of native heart without angina pectoris   (+) HTN (hypertension)   (+) Hypercholesterolemia   (+) Non-ST elevation myocardial infarction (NSTEMI), subsequent episode of care (Multi)   (+) Pacemaker (100% paced. Magnet rate 85)      Pulmonary   (+) COPD (chronic obstructive pulmonary disease) (Multi)      GI   (+) Erosive gastritis   (+) GERD without esophagitis   (+) GI (gastrointestinal bleed)      /Renal   (+) Chronic renal impairment, stage 2 (mild)      Endocrine   (+) Diabetes mellitus type 2, noninsulin dependent (Multi) (BS-124)      Hematology   (+) Iron deficiency anemia       Clinical information reviewed:   Tobacco  Allergies  Meds   Med Hx  Surg Hx  OB Status  Fam Hx  Soc   Hx        NPO Detail:  NPO/Void Status  Carbohydrate Drink Given Prior to Surgery? : N  Date of Last Liquid: 07/05/24  Time of Last Liquid: 0000  Date of Last Solid: 07/05/24  Time of Last Solid: 0000  Last Intake Type: Clear fluids  Time of Last Void: 1223         Physical Exam    Airway  Mallampati: II  TM distance: >3 FB  Neck ROM: full     Cardiovascular   Rhythm: regular  Rate: normal     Dental   (+) upper dentures, lower dentures     Pulmonary   Breath sounds clear to auscultation     Abdominal            Anesthesia Plan    History of general anesthesia?: yes  History of complications of general anesthesia?: no    ASA 3     general   (Perioperative NSTEMI (in setting of EVAR 11/2019), CAD s/p PCI (11/22/2019) to proximal LAD, Chronic Systolic Heart Failure with LVEF 35% ( 11/2019 ) with most recent EF low 60's, Complete Heart Block S/P Medtronic Dual Chamber PM. Of note, patient has anti K antibodies. )  The patient is not a current smoker.    intravenous induction   Postoperative administration of opioids is intended.  Trial extubation is planned.  Anesthetic plan and risks discussed with patient.    Plan discussed with CRNA.

## 2024-07-05 NOTE — ANESTHESIA POSTPROCEDURE EVALUATION
Patient: Maci Catalan    Procedure Summary       Date: 07/05/24 Room / Location: [facility] OR 17 / Virtual [facility] OR    Anesthesia Start: 1623 Anesthesia Stop: 1754    Procedure: Right Hip Fracture ORIF w/ Nail Trochanteric (Right: Hip) Diagnosis:       Closed right hip fracture, initial encounter (Multi)      (Closed right hip fracture, initial encounter (Multi) [S72.001A])    Surgeons: Malik Barton MD Responsible Provider: Ozzy Glover MD    Anesthesia Type: general ASA Status: 3            Anesthesia Type: general    Vitals Value Taken Time   BP 99/76 07/05/24 1831   Temp 36.9 °C (98.4 °F) 07/05/24 1830   Pulse 93 07/05/24 1831   Resp 14 07/05/24 1830   SpO2 97 % 07/05/24 1831   Vitals shown include unfiled device data.    Anesthesia Post Evaluation    Patient participation: complete - patient participated  Level of consciousness: awake  Pain management: adequate  Airway patency: patent  Cardiovascular status: acceptable and hemodynamically stable  Respiratory status: acceptable  Hydration status: acceptable  Postoperative Nausea and Vomiting: none        There were no known notable events for this encounter.

## 2024-07-05 NOTE — PERIOPERATIVE NURSING NOTE
1743 Pt arrived to pac[facility] bay [facility] at this time, report received from OR and anesthesia staff. Phase 1 care started. Assuming care of pt at this time. Bedside report received from outgoing RN. Message sent to family via text at this time.     1745 o2 changes to 4l n.c    1800 o2 at 3L nc     1815 no assessment changes     1830 no assessment changes , Pt meets discharge criteria from phase 1 at this time, SBAR sent to floor nurse   1835   Pt taken up to room via staff at this time in stable condition. Report previously sent to receiving RN. All belongings taken with pt to room, family updated on pt status.

## 2024-07-05 NOTE — H&P
History Of Present Illness  Maci Catalan is a 89 y.o. female presenting with complains of pain in the R hip. She fell 7 days ago. Was seen at [facility] and had negative plain films and was able to ambulate so was discharged home. Continues to have pain. She is unable to bear weight. Returned to ER work up noted, R hip/femur display right displaced basicervical femoral neck fracture. She was admitted for further care. At present tells me pain is OK at rest, worse w movents in bed and that her pain medication is helpful. Pt denies chest pain / tightness / heaviness / pressure / radiating pain / palpitations / irregular heartbeats / lightheadedness / cough / congestion / SOB or CHRISTIANSON / orthopnea or paroxysmal nocturnal dyspnea / near syncope / weight loss or gain. She was seen by orthopedics yesterday who is planning to take to OR for fixation. She was seen by cardiology yesterday and cleared for procedure        Past Medical History  She has a past medical history of Personal history of other diseases of the circulatory system and Personal history of other endocrine, nutritional and metabolic disease.    Surgical History  She has a past surgical history that includes Hysterectomy (04/17/2018); Other surgical history (06/19/2018); Cholecystectomy (06/19/2018); Other surgical history (06/21/2019); Other surgical history (06/21/2019); CT angio abdomen pelvis w and or wo IV IV contrast (8/16/2019); CT angio aorta and bilateral iliofemoral runoff w and or wo IV contrast (11/7/2019); IR angiogram aorta abdomen (11/6/2019); and CT angio abdomen pelvis w and or wo IV IV contrast (10/16/2022).     Social History  She reports that she quit smoking about 7 years ago. Her smoking use included cigarettes. She has been exposed to tobacco smoke. She has never used smokeless tobacco. She reports that she does not drink alcohol and does not use drugs.    Family History  Family History   Problem Relation Name Age of Onset     Hyperlipidemia Mother      Hyperlipidemia Father          Allergies  Aspirin, Codeine, and Hydrocortisone    Review of Systems   Constitutional:  Positive for activity change and fatigue.   Musculoskeletal:  Positive for arthralgias and myalgias.        Rt hip pain  Unable to bear weight         Physical Exam  Constitutional:       Appearance: Normal appearance.      Comments: Frail elderly  Awake and alert  Appropriate    HENT:      Head: Normocephalic.   Eyes:      Pupils: Pupils are equal, round, and reactive to light.   Cardiovascular:      Rate and Rhythm: Normal rate and regular rhythm.   Pulmonary:      Effort: Pulmonary effort is normal.      Breath sounds: Normal breath sounds.   Abdominal:      General: Bowel sounds are normal.      Palpations: Abdomen is soft.   Musculoskeletal:         General: Swelling, tenderness and signs of injury present.      Cervical back: Normal range of motion.      Comments: Rt hip   Skin:     General: Skin is warm and dry.      Capillary Refill: Capillary refill takes less than 2 seconds.   Neurological:      Mental Status: She is alert and oriented to person, place, and time.   Psychiatric:         Mood and Affect: Mood normal.          Last Recorded Vitals  /53 (BP Location: Right arm, Patient Position: Lying)   Pulse 71   Temp 36.2 °C (97.1 °F) (Temporal)   Resp 11   Wt 45.8 kg (100 lb 15.5 oz)   SpO2 93%     Relevant Results             No current facility-administered medications on file prior to encounter.     Current Outpatient Medications on File Prior to Encounter   Medication Sig Dispense Refill    fluticasone (Flonase) 50 mcg/actuation nasal spray Administer 1 spray into each nostril once daily. Shake gently. Before first use, prime pump. After use, clean tip and replace cap.      furosemide (Lasix) 40 mg tablet Take 1 tablet (40 mg) by mouth once daily. 100 tablet 2    lisinopril 2.5 mg tablet Take 1 tablet (2.5 mg) by mouth once daily. 100 tablet 2     metFORMIN (Glucophage) 500 mg tablet Take 1 tablet (500 mg) by mouth every 12 hours. With food      metoprolol tartrate (Lopressor) 25 mg tablet Take 1 tablet (25 mg) by mouth 2 times a day. 200 tablet 2    omeprazole (PriLOSEC) 40 mg DR capsule Take 1 capsule (40 mg) by mouth 2 times a day.      sucralfate (Carafate) 1 gram tablet Take 1 tablet (1 g) by mouth 4 times a day.      acetaminophen (TylenoL) 325 mg tablet Take 2 tablets (650 mg) by mouth every 6 hours if needed for mild pain (1 - 3) for up to 14 days. 60 tablet 0    HYDROcodone-acetaminophen (Norco) 5-325 mg tablet Take 1 tablet by mouth every 6 hours if needed for severe pain (7 - 10). Rx 3 day supply on 7/3/24      lidocaine (Lidoderm) 5 % patch Place 1 patch over 12 hours on the skin once daily for 7 days. Remove & discard patch within 12 hours or as directed by MD. 7 patch 0    melatonin 10 mg tablet Take 1 tablet (10 mg) by mouth once daily at bedtime.      methocarbamol (Robaxin) 500 mg tablet Take 1 tablet (500 mg) by mouth 4 times a day for 7 days. As needed for pain 28 tablet 0    nitroglycerin (Nitrostat) 0.4 mg SL tablet Place 1 tablet (0.4 mg) under the tongue every 5 minutes if needed for chest pain (UP TO 3 DOSES AS NEEDED FOR CHEST PAIN.CALL 911 IF PAIN PERSISTS.).      [DISCONTINUED] famotidine (Pepcid) 20 mg tablet Take 1 tablet (20 mg) by mouth once daily at bedtime.       Results for orders placed or performed during the hospital encounter of 07/04/24 (from the past 24 hour(s))   CBC and Auto Differential   Result Value Ref Range    WBC 10.9 4.4 - 11.3 x10*3/uL    nRBC 0.0 0.0 - 0.0 /100 WBCs    RBC 4.56 4.00 - 5.20 x10*6/uL    Hemoglobin 10.4 (L) 12.0 - 16.0 g/dL    Hematocrit 34.6 (L) 36.0 - 46.0 %    MCV 76 (L) 80 - 100 fL    MCH 22.8 (L) 26.0 - 34.0 pg    MCHC 30.1 (L) 32.0 - 36.0 g/dL    RDW 16.4 (H) 11.5 - 14.5 %    Platelets 211 150 - 450 x10*3/uL    Neutrophils % 88.9 40.0 - 80.0 %    Immature Granulocytes %, Automated 0.8 0.0  - 0.9 %    Lymphocytes % 4.7 13.0 - 44.0 %    Monocytes % 4.9 2.0 - 10.0 %    Eosinophils % 0.3 0.0 - 6.0 %    Basophils % 0.4 0.0 - 2.0 %    Neutrophils Absolute 9.69 (H) 1.60 - 5.50 x10*3/uL    Immature Granulocytes Absolute, Automated 0.09 0.00 - 0.50 x10*3/uL    Lymphocytes Absolute 0.51 (L) 0.80 - 3.00 x10*3/uL    Monocytes Absolute 0.53 0.05 - 0.80 x10*3/uL    Eosinophils Absolute 0.03 0.00 - 0.40 x10*3/uL    Basophils Absolute 0.04 0.00 - 0.10 x10*3/uL   Basic metabolic panel   Result Value Ref Range    Glucose 199 (H) 74 - 99 mg/dL    Sodium 137 136 - 145 mmol/L    Potassium 3.5 3.5 - 5.3 mmol/L    Chloride 104 98 - 107 mmol/L    Bicarbonate 18 (L) 21 - 32 mmol/L    Anion Gap 19 10 - 20 mmol/L    Urea Nitrogen 57 (H) 6 - 23 mg/dL    Creatinine 2.84 (H) 0.50 - 1.05 mg/dL    eGFR 15 (L) >60 mL/min/1.73m*2    Calcium 8.6 8.6 - 10.3 mg/dL   Protime-INR   Result Value Ref Range    Protime 11.7 9.8 - 12.8 seconds    INR 1.0 0.9 - 1.1   APTT   Result Value Ref Range    aPTT 29 27 - 38 seconds   Type and Screen   Result Value Ref Range    ABO TYPE AB     Rh TYPE POS     ANTIBODY SCREEN POS    BB ORDER ONLY - Antibody Identification   Result Value Ref Range    Antibody ID Anti-K     CASE #     Basic metabolic panel   Result Value Ref Range    Glucose 128 (H) 74 - 99 mg/dL    Sodium 141 136 - 145 mmol/L    Potassium 3.4 (L) 3.5 - 5.3 mmol/L    Chloride 107 98 - 107 mmol/L    Bicarbonate 21 21 - 32 mmol/L    Anion Gap 16 10 - 20 mmol/L    Urea Nitrogen 56 (H) 6 - 23 mg/dL    Creatinine 2.73 (H) 0.50 - 1.05 mg/dL    eGFR 16 (L) >60 mL/min/1.73m*2    Calcium 8.3 (L) 8.6 - 10.3 mg/dL   CBC   Result Value Ref Range    WBC 7.8 4.4 - 11.3 x10*3/uL    nRBC 0.0 0.0 - 0.0 /100 WBCs    RBC 4.31 4.00 - 5.20 x10*6/uL    Hemoglobin 9.8 (L) 12.0 - 16.0 g/dL    Hematocrit 33.3 (L) 36.0 - 46.0 %    MCV 77 (L) 80 - 100 fL    MCH 22.7 (L) 26.0 - 34.0 pg    MCHC 29.4 (L) 32.0 - 36.0 g/dL    RDW 16.7 (H) 11.5 - 14.5 %    Platelets 233 150  - 450 x10*3/uL       Assessment/Plan   Principal Problem:    Closed right hip fracture, initial encounter (Multi)  Active Problems:    Benign essential HTN    3-vessel coronary artery disease    Diabetes mellitus type 2, noninsulin dependent (Multi)    Red blood cell antibody positive    History of endovascular stent graft for abdominal aortic aneurysm (AAA)    -input from ortho noted. Planning for OR today   -NPO  -see orders for pain control    Extensive cardiac history  >CAD w 3v PCI  >Chr Systolic CHF, EF 35%  > Hx of AAA repair  >Hx of Complete heart block s/p dual chamber pacer  -seen by cards, low to intermediate risk for moderate risk surgery and can proceed.        MOOK on CKD III  -gentle IVFs and trend labs  -hold lasix, ace-i    COPD  -stable    DM2  -NPO for now  -D5NS IVFs    VTE Prophylaxis  -See Orders    -Continue current treatment as ordered. Will make adjustments as necessary.    Plan of care discussed with: Provider, RN, Patient.        Patient case and plan of care discussed with Dr. JACQUELYN Hendrix.    GLENIS Norris - CNP  -In collaboration with Dr. JACQUELYN Hendrix    [facility]  Office: [phone]  Fax: [phone]  I have reviewed the above note obtained and documented by the NP/PA and I personally participated in the key components. I have discussed the case and management of the patient's care. Changes made to the note, and all key components of history and physical/progress note done by me.  dw nursing  Jacky Hendrix MD

## 2024-07-05 NOTE — PROGRESS NOTES
07/05/24 0936   Discharge Planning   Living Arrangements Alone   Support Systems Family members;Friends/neighbors;Children   Assistance Needed Patient at baseline is A&OX4, Independent, does not drive (sister takes her to appts and to the grocery store), does not use any walkers or canes and is on room air at baseline   Type of Residence Private residence   Number of Stairs to Enter Residence 0   Number of Stairs Within Residence 0   Do you have animals or pets at home? No   Who is requesting discharge planning? Patient   Home or Post Acute Services In home services   Type of Home Care Services Home nursing visits;Home OT;Home PT   Patient expects to be discharged to: Patient preference is to D/C home with homecare. Patient will be going to the OR on 07/05/24 and will be evaluated by PT/OT post op for recommendations.   Does the patient need discharge transport arranged? No   Patient Choice   Provider Choice list and CMS website (https://medicare.gov/care-compare#search) for post-acute Quality and Resource Measure Data were provided and reviewed with: Patient   Patient / Family choosing to utilize agency / facility established prior to hospitalization No

## 2024-07-05 NOTE — ANESTHESIA PROCEDURE NOTES
Peripheral IV  Date/Time: 7/5/2024 4:34 PM  Inserted by: Ozzy Glover MD    Placement  Needle size: 18 G  Laterality: left  Location: arm  Local anesthetic: none  Site prep: alcohol  Technique: anatomical landmarks  Attempts: 1

## 2024-07-05 NOTE — H&P
History Of Present Illness  Maci Catalan is a 89 y.o. female presenting with fall few days ago   And rt hip pain  Came back to ED for pain and noted to have fracture hip  Admited   No LOC  Does have pain in the hip   No faye pain  No shortness of breath   No GI bleed     Past Medical History  She has a past medical history of Personal history of other diseases of the circulatory system and Personal history of other endocrine, nutritional and metabolic disease.    Surgical History  She has a past surgical history that includes Hysterectomy (04/17/2018); Other surgical history (06/19/2018); Cholecystectomy (06/19/2018); Other surgical history (06/21/2019); Other surgical history (06/21/2019); CT angio abdomen pelvis w and or wo IV IV contrast (8/16/2019); CT angio aorta and bilateral iliofemoral runoff w and or wo IV contrast (11/7/2019); IR angiogram aorta abdomen (11/6/2019); and CT angio abdomen pelvis w and or wo IV IV contrast (10/16/2022).     Social History  She reports that she quit smoking about 7 years ago. Her smoking use included cigarettes. She has been exposed to tobacco smoke. She has never used smokeless tobacco. She reports that she does not drink alcohol and does not use drugs.    Family History  Family History   Problem Relation Name Age of Onset    Hyperlipidemia Mother      Hyperlipidemia Father          Allergies  Aspirin, Codeine, and Hydrocortisone    Review of Systems   As mentioned in HPI  Further negative  Physical Exam     Well developed thin   No distress  Chest clear  CVS regular  Abdo soft nontender bs active   Ext no edema  Cns ao x 3  Heent normal muocsa    Last Recorded Vitals  /53 (BP Location: Right arm, Patient Position: Lying)   Pulse 71   Temp 36.2 °C (97.1 °F) (Temporal)   Resp 11   Wt 45.8 kg (100 lb 15.5 oz)   SpO2 93%     Relevant Results  Scheduled medications  fluticasone, 1 spray, Each Nostril, Daily  [Held by provider] furosemide, 40 mg, oral,  Daily  heparin (porcine), 5,000 Units, subcutaneous, q8h LISA  [Held by provider] lisinopril, 2.5 mg, oral, Daily  melatonin, 9 mg, oral, Nightly  methocarbamol, 500 mg, oral, 4x daily  metoprolol tartrate, 25 mg, oral, BID  pantoprazole, 40 mg, oral, Daily before breakfast  sucralfate, 1 g, oral, 4x daily      Continuous medications  D5 % and 0.9 % sodium chloride, 50 mL/hr      PRN medications  PRN medications: acetaminophen **OR** acetaminophen **OR** acetaminophen, acetaminophen, morphine, nitroglycerin, ondansetron **OR** ondansetron  Results for orders placed or performed during the hospital encounter of 07/04/24 (from the past 96 hour(s))   CBC and Auto Differential   Result Value Ref Range    WBC 10.9 4.4 - 11.3 x10*3/uL    nRBC 0.0 0.0 - 0.0 /100 WBCs    RBC 4.56 4.00 - 5.20 x10*6/uL    Hemoglobin 10.4 (L) 12.0 - 16.0 g/dL    Hematocrit 34.6 (L) 36.0 - 46.0 %    MCV 76 (L) 80 - 100 fL    MCH 22.8 (L) 26.0 - 34.0 pg    MCHC 30.1 (L) 32.0 - 36.0 g/dL    RDW 16.4 (H) 11.5 - 14.5 %    Platelets 211 150 - 450 x10*3/uL    Neutrophils % 88.9 40.0 - 80.0 %    Immature Granulocytes %, Automated 0.8 0.0 - 0.9 %    Lymphocytes % 4.7 13.0 - 44.0 %    Monocytes % 4.9 2.0 - 10.0 %    Eosinophils % 0.3 0.0 - 6.0 %    Basophils % 0.4 0.0 - 2.0 %    Neutrophils Absolute 9.69 (H) 1.60 - 5.50 x10*3/uL    Immature Granulocytes Absolute, Automated 0.09 0.00 - 0.50 x10*3/uL    Lymphocytes Absolute 0.51 (L) 0.80 - 3.00 x10*3/uL    Monocytes Absolute 0.53 0.05 - 0.80 x10*3/uL    Eosinophils Absolute 0.03 0.00 - 0.40 x10*3/uL    Basophils Absolute 0.04 0.00 - 0.10 x10*3/uL   Basic metabolic panel   Result Value Ref Range    Glucose 199 (H) 74 - 99 mg/dL    Sodium 137 136 - 145 mmol/L    Potassium 3.5 3.5 - 5.3 mmol/L    Chloride 104 98 - 107 mmol/L    Bicarbonate 18 (L) 21 - 32 mmol/L    Anion Gap 19 10 - 20 mmol/L    Urea Nitrogen 57 (H) 6 - 23 mg/dL    Creatinine 2.84 (H) 0.50 - 1.05 mg/dL    eGFR 15 (L) >60 mL/min/1.73m*2     Calcium 8.6 8.6 - 10.3 mg/dL   Protime-INR   Result Value Ref Range    Protime 11.7 9.8 - 12.8 seconds    INR 1.0 0.9 - 1.1   APTT   Result Value Ref Range    aPTT 29 27 - 38 seconds   Type and Screen   Result Value Ref Range    ABO TYPE AB     Rh TYPE POS     ANTIBODY SCREEN POS    BB ORDER ONLY - Antibody Identification   Result Value Ref Range    Antibody ID Anti-K     CASE #     Basic metabolic panel   Result Value Ref Range    Glucose 128 (H) 74 - 99 mg/dL    Sodium 141 136 - 145 mmol/L    Potassium 3.4 (L) 3.5 - 5.3 mmol/L    Chloride 107 98 - 107 mmol/L    Bicarbonate 21 21 - 32 mmol/L    Anion Gap 16 10 - 20 mmol/L    Urea Nitrogen 56 (H) 6 - 23 mg/dL    Creatinine 2.73 (H) 0.50 - 1.05 mg/dL    eGFR 16 (L) >60 mL/min/1.73m*2    Calcium 8.3 (L) 8.6 - 10.3 mg/dL   CBC   Result Value Ref Range    WBC 7.8 4.4 - 11.3 x10*3/uL    nRBC 0.0 0.0 - 0.0 /100 WBCs    RBC 4.31 4.00 - 5.20 x10*6/uL    Hemoglobin 9.8 (L) 12.0 - 16.0 g/dL    Hematocrit 33.3 (L) 36.0 - 46.0 %    MCV 77 (L) 80 - 100 fL    MCH 22.7 (L) 26.0 - 34.0 pg    MCHC 29.4 (L) 32.0 - 36.0 g/dL    RDW 16.7 (H) 11.5 - 14.5 %    Platelets 233 150 - 450 x10*3/uL          Assessment/Plan   Principal Problem:    Closed right hip fracture, initial encounter (Multi)  Active Problems:    Benign essential HTN    3-vessel coronary artery disease    Diabetes mellitus type 2, noninsulin dependent (Multi)    Red blood cell antibody positive    History of endovascular stent graft for abdominal aortic aneurysm (AAA)      Ortho consulted  Cardio for cardiac clearance  Home meds resumed  Pain control   Dvtppx scds         Jacky Hendrix MD

## 2024-07-05 NOTE — H&P
H&P reviewed. The patient was examined and there are no changes to the H&P. Patient electing to proceed with surgery. Patient marked and consented.     Anita Bryant,   Orthopaedic Surgery, PGY3  Available on Epic Chat

## 2024-07-05 NOTE — CARE PLAN
The patient's goals for the shift include      The clinical goals for the shift include pain management    Over the shift, the patient did not make progress toward the following goals. Barriers to progression include   Problem: Pain - Adult  Goal: Verbalizes/displays adequate comfort level or baseline comfort level  Outcome: Progressing     Problem: Safety - Adult  Goal: Free from fall injury  Outcome: Progressing     Problem: Discharge Planning  Goal: Discharge to home or other facility with appropriate resources  Outcome: Progressing     Problem: Chronic Conditions and Co-morbidities  Goal: Patient's chronic conditions and co-morbidity symptoms are monitored and maintained or improved  Outcome: Progressing   . Recommendations to address these barriers include .

## 2024-07-05 NOTE — CARE PLAN
The patient's goals for the shift include      The clinical goals for the shift include pain management      Problem: Pain - Adult  Goal: Verbalizes/displays adequate comfort level or baseline comfort level  Outcome: Progressing     Problem: Safety - Adult  Goal: Free from fall injury  Outcome: Progressing     Problem: Discharge Planning  Goal: Discharge to home or other facility with appropriate resources  Outcome: Progressing     Problem: Chronic Conditions and Co-morbidities  Goal: Patient's chronic conditions and co-morbidity symptoms are monitored and maintained or improved  Outcome: Progressing

## 2024-07-05 NOTE — OP NOTE
Right Hip Fracture ORIF w/ Nail Trochanteric (R) Operative Note     Date: 2024 - 2024  OR Location: [facility]    Name: Maci Catalan YOB: 1935, Age: 89 y.o., MRN: [id number], Sex: female    Diagnosis  Pre-op Diagnosis     * Closed right hip fracture, initial encounter (Multi) [S72.001A] Post-op Diagnosis     * Closed right hip fracture, initial encounter (Multi) [S72.001A]     Procedures  Right Hip Fracture ORIF w/ Nail Trochanteric  89853 - LA TX INTER/LA/SUBTRCHNTRIC FEM FX IMED IMPLTSCREW      Surgeons      * Malik Barton - Primary    Resident/Fellow/Other Assistant:  Surgeons and Role:     * Raad Montgomery DO - Resident - Assisting    Procedure Summary  Anesthesia: General  ASA: III  Anesthesia Staff: Anesthesiologist: Ozzy Glover MD  C-AA: JOLEEN Hoffman  Estimated Blood Loss: 25 mL  Intra-op Medications: Administrations occurring from 1300 to 1420 on 24:  * No intraprocedure medications in log *           Anesthesia Record               Intraprocedure I/O Totals          Intake    Tranexamic Acid 0.00 mL    The total shown is the total volume documented since Anesthesia Start was filed.    Total Intake 0 mL          Specimen: No specimens collected     Staff:   Vinceulator: Felicia Aguillon Person: Jeanne Angeles Scrub: Aaron         Drains and/or Catheters: * None in log *    Tourniquet Times:         Implants:  Implants       Type Name Action Serial No.      Screw TROCH NAIL, GAM3 125D [id number] W/TI SET SCRW - [id number] Implanted      Screw LAG SCREW, GAM3 TI 10.5 X 85MM - [id number] Implanted      Screw LOCKING SCREW, T2 FULL THR 5MM X 30MM - [id number] Implanted               Findings: As above    Indications: Maci Catalan is an 89 y.o. female who is having surgery for Closed right hip fracture, initial encounter (Multi) [S72.001A].     The patient was seen in the preoperative area. The risks, benefits, complications, treatment options, non-operative  alternatives, expected recovery and outcomes were discussed with the patient. The possibilities of reaction to medication, pulmonary aspiration, injury to surrounding structures, bleeding, recurrent infection, the need for additional procedures, failure to diagnose a condition, and creating a complication requiring transfusion or operation were discussed with the patient. The patient concurred with the proposed plan, giving informed consent.  The site of surgery was properly noted/marked if necessary per policy. The patient has been actively warmed in preoperative area. Preoperative antibiotics have been ordered and given within 1 hours of incision. Venous thrombosis prophylaxis have been ordered including unilateral sequential compression device and chemical prophylaxis    Preoperative diagnosis: Right intertrochanteric hip fracture      Postoperative diagnosis: Same     Procedure: Right hip cephalomedullary nail (CPT 84773)     Date of Procedure: 7/5/2024     Attending Surgeon: Malik Barton MD     Assistants: Raad Montgomery DO    Anesthesia: General     Estimated blood loss: 25 cc     Intraoperative findings:     Components used: [place] Short Gamma cephalomedullary nail 11 mm     Indications:     We reviewed the informed consent process and form in the hospital and both signed.  The surgical site was signed and I performed a timeout in the operating room. The patient received prophylactic antibiotics prior to skin incision.     Details of procedure:  Patient was taken to the operating room and placed on the fracture table in supine position.  At this point general anesthesia was administered.  After induction the anesthesia team to control the patient cervical spine as well as airway.  All bony prominences were properly identified well-padded.  Traction was pulled on the leg along with slight internal rotation.  Fluoroscopic views were obtained prior to prep and traction as well as rotation were adjusted  until adequate reduction of been obtained.  The right lower extremity was then prepped and draped in sterile orthopedic condition.  Once drapes were in place a timeout procedure was performed confirming appropriate patient identity, surgical site as well as procedure to be performed.  Once all in the room were in agreement we would proceed with the case.    A percutaneous stab incision with a 15 blade was made approximately 3 fingerbreadths proximal to the greater trochanter.  A 3.2 mm was then placed percutaneously and confirmed to be in the appropriate position just medial to the tip the greater trochanter on AP and centered on the lateral allowing the nail to be placed intramedullary so that a cephalomedullary screw could be placed centered into the neck and into the head.  The pin was advanced and a one-step conical entry reamer was used to access the canal.  The short cephalomedullary nail was then assembled on the back table and inserted into the femur without reaming.  It was inserted to the appropriate level as determined on an AP fluoroscopic image.  At this point we came to the lateral and center the nail on the femoral head.  We then brought the proximal jig in line with our nail in place to marked the skin with the entry trocar.  The skin was incised with a 10 blade as well as the IT band.  A trocar was placed down to the level of the lateral femur confirming that the pin was in line with the nail we advanced the pin into the femoral head confirming it was heading center on the lateral.  We then came back to an AP image confirming that we were coming to the center of the femoral head on an AP image and inserted the pin to the appropriate depth.  The lag screw was then measured over top the pin in appropriate fashion and we were able to ream for the pin after setting the reamer to the appropriate length.  Lag screw was then selected and placed over the wire to the appropriate depth.  We then were able to  compress the fracture with a lag screw followed by placement of a set screw.  The trocar was removed and we placed the distal interlocking trocar to the skin.  The skin was then incised with a 10 blade and trocar was advanced down to the level of the lateral femur after incising the IT band.  We then drilled, measured and inserted our distal interlocking screw in appropriate fashion.  The proximal jig was then removed and final fluoroscopic views were obtained, both AP and lateral.  Wounds were irrigated.  Deep fascia was reapproximated using 0 Vicryl suture.  This was followed by subQ layer with 2-0 Vicryl.  Staples were used for skin.  Dry sterile dressings were applied.  Patient was taken the PACU in stable condition and overall tolerated procedure well.  There were no major complications.     Post Operative Plan: Weightbearing as tolerated right lower extremity.  Patient will be given appropriate pain medication as well as aspirin 81 mg twice daily for DVT prophylaxis in the hospital and upon discharge.  Follow-up should be with me in 2 to 4 weeks with repeat right hip films.     Note dictated with [employer]  transcription software. Completed without full typed error editing and sent to avoid delay.    Attending Attestation: I was present and scrubbed for the entire procedure.    Malik Barton  Phone Number: [phone]

## 2024-07-05 NOTE — PROGRESS NOTES
Orthopaedic Surgery Progress Note    S:  No acute events overnight. Pain well controlled. Denies chest pain, shortness of breath, or fevers.    O:  /53 (BP Location: Right arm, Patient Position: Lying)   Pulse 71   Temp 36.2 °C (97.1 °F) (Temporal)   Resp 11   Ht 1.524 m (5')   Wt 45.8 kg (100 lb 15.5 oz)   SpO2 93%   BMI 19.72 kg/m²     Gen: arousable, NAD, appropriately conversational  Cardiac: RRR to peripheral palpation  Resp: nonlabored on RA  GI: soft, nondistended    MSK:  Right Lower Extremity:   -Skin intact  -Tender at site of injury with painful ROM.  -Fires DF/PF/EHL/FHL  -SILT in saph/sural/SPN/DPN distributions  -Foot warm, well perfused  -Palpable DP pulse, brisk cap refill  -Compartments soft and compressible     A/P: 89 y.o. female s/p GLF w R basicervical FNFx.     C/p CMN with orthopaedics today.     - Weightbearing: NWB RLE, strict bedrest  - DVT PPx: SCDs, DVT chemoppx per primary  - Pain: Multimodal pain regimen per primary  -  NPO for OR today  - appreciate documentation of clearance by cardiology/medicine      Anita Bryant DO  PGY-3 Orthopedic Surgery  [facility]  Pager: [phone]  Available by Epic Message    While admitted, this patient will be followed by the Sports Team. Please reach out to orthopaedic on-call RAJ first with any questions or concerns. Please contact below residents with any questions (available via Epic Chat).     Joints Team: Joseluis Waller, PGY1; Marcos Ambrosio, PGY2; Bartolo Scott, PGY4    Sports Team: Anita Bryant, PGY3; Sia Norton, PGY5    Community Team: Pravin Keith, PGY3;  Hugh Hodges, PGY5    Shoulder/Elbow Team:  Raad Montgomery, PGY4    Foot/Ankle Team: Cristian Looney, PGY3    On weekends and after 6PM:  At [facility]: Please reach out to the orthopaedic on-call resident ([phone])  At [facility]: Please reach out to the orthopaedic on-call RAJ or resident (please refer to Qgenda)

## 2024-07-05 NOTE — PROGRESS NOTES
Maci Catalan is a 89 y.o. female on day 1 of admission presenting with Closed right hip fracture, initial encounter (Multi).    Subjective   Patient examined in PACU.  Pain controlled. Denied N/T down the operative extremity.        Objective     Physical Exam    Right Lower Extremity:   -Skin intact  -dressing is clean, dry and intact  -Fires DF/PF/EHL/FHL  -SILT in saph/sural/SPN/DPN distributions  -Foot warm, well perfused  -Palpable DP pulse, brisk cap refill  -Compartments soft and compressible      Last Recorded Vitals  Blood pressure 129/62, pulse 78, temperature 37.1 °C (98.8 °F), temperature source Temporal, resp. rate 18, height 1.524 m (5'), weight 45.8 kg (100 lb 15.5 oz), SpO2 96%.  Intake/Output last 3 Shifts:  No intake/output data recorded.    Relevant Results                             Assessment/Plan   Principal Problem:    Closed right hip fracture, initial encounter (Multi)  Active Problems:    Benign essential HTN    3-vessel coronary artery disease    Diabetes mellitus type 2, noninsulin dependent (Multi)    Red blood cell antibody positive    History of endovascular stent graft for abdominal aortic aneurysm (AAA)    Pacemaker    Patient is a 89F who is s/p Right IMN fixation with Dr. Barton on 7/5.      Plan:  Medicine primary  WBAT of the operative extremity  Ancef x 3 doses post operatively  DVT PPX: per primary.  Recommend ASA 81mg BID starting POD1 for 42 days  Pain control with multiple modalities  Regular diet  Soft dressings until follow up visit  Resume home medications  PT/OT consulted.  Appreciate recommendations    Discussed with Dr. Mick Montgomery DO  Orthopedic Surgery, PGY4           Raad Montgomery DO

## 2024-07-05 NOTE — BRIEF OP NOTE
Date: 2024  OR Location: [facility] OR    Name: Maci Catalan, : 1935, Age: 89 y.o., MRN: [id number], Sex: female    Diagnosis  Pre-op Diagnosis     * Closed right hip fracture, initial encounter (Multi) [S72.001A] Post-op Diagnosis     * Closed right hip fracture, initial encounter (Multi) [S72.001A]     Procedures  Right Hip Fracture ORIF w/ Nail Trochanteric  23754 - DC TX INTER/DC/SUBTRCHNTRIC FEM FX IMED IMPLTSCREW      Surgeons      * Malik Barton - Primary    Resident/Fellow/Other Assistant:  Surgeons and Role:     * Raad Montgomery DO - Resident - Assisting    Procedure Summary  Anesthesia: General  ASA: III  Anesthesia Staff: Anesthesiologist: Ozzy Glover MD  C-AA: JOLEEN Hoffman  Estimated Blood Loss: 50 mL  Intra-op Medications: Administrations occurring from 1300 to 1420 on 24:  * No intraprocedure medications in log *           Anesthesia Record               Intraprocedure I/O Totals          Intake    Tranexamic Acid 0.00 mL    The total shown is the total volume documented since Anesthesia Start was filed.    Total Intake 0 mL          Specimen: No specimens collected     Staff:   Circulator: Felicia Aguillon Person: Jeanne Angeles Scrub: Aaron          Findings: see post surgical note     Complications:  None; patient tolerated the procedure well.     Disposition: PACU - hemodynamically stable.  Condition: stable  Specimens Collected: No specimens collected  Attending Attestation: I was present and scrubbed for the entire procedure.    Malik Barton  Phone Number: [phone]

## 2024-07-05 NOTE — ANESTHESIA PROCEDURE NOTES
Airway  Date/Time: 7/5/2024 4:33 PM  Urgency: elective      Staffing  Performed: JOLEEN   Authorized by: Stacie Nicole MD    Performed by: JOLEEN Hoffman  Patient location during procedure: OR    Indications and Patient Condition  Indications for airway management: anesthesia and airway protection  Spontaneous Ventilation: absent  Sedation level: deep  Preoxygenated: yes  Patient position: sniffing  Mask difficulty assessment: 1 - vent by mask    Final Airway Details  Final airway type: endotracheal airway      Successful airway: ETT  Cuffed: yes   Successful intubation technique: direct laryngoscopy  Facilitating devices/methods: intubating stylet  Endotracheal tube insertion site: oral  Blade: Drew  Blade size: #3  ETT size (mm): 6.5  Cormack-Lehane Classification: grade I - full view of glottis  Placement verified by: chest auscultation and capnometry   Measured from: lips  ETT to lips (cm): 22  Number of attempts at approach: 1

## 2024-07-06 LAB
ANION GAP SERPL CALC-SCNC: 16 MMOL/L (ref 10–20)
BUN SERPL-MCNC: 38 MG/DL (ref 6–23)
CALCIUM SERPL-MCNC: 7.7 MG/DL (ref 8.6–10.3)
CHLORIDE SERPL-SCNC: 109 MMOL/L (ref 98–107)
CO2 SERPL-SCNC: 20 MMOL/L (ref 21–32)
CREAT SERPL-MCNC: 1.83 MG/DL (ref 0.5–1.05)
EGFRCR SERPLBLD CKD-EPI 2021: 26 ML/MIN/1.73M*2
ERYTHROCYTE [DISTWIDTH] IN BLOOD BY AUTOMATED COUNT: 16.5 % (ref 11.5–14.5)
GLUCOSE BLD MANUAL STRIP-MCNC: 181 MG/DL (ref 74–99)
GLUCOSE BLD MANUAL STRIP-MCNC: 186 MG/DL (ref 74–99)
GLUCOSE BLD MANUAL STRIP-MCNC: 189 MG/DL (ref 74–99)
GLUCOSE BLD MANUAL STRIP-MCNC: 295 MG/DL (ref 74–99)
GLUCOSE SERPL-MCNC: 172 MG/DL (ref 74–99)
HCT VFR BLD AUTO: 27.9 % (ref 36–46)
HGB BLD-MCNC: 8.3 G/DL (ref 12–16)
MCH RBC QN AUTO: 22.8 PG (ref 26–34)
MCHC RBC AUTO-ENTMCNC: 29.7 G/DL (ref 32–36)
MCV RBC AUTO: 77 FL (ref 80–100)
NRBC BLD-RTO: 0 /100 WBCS (ref 0–0)
PLATELET # BLD AUTO: 234 X10*3/UL (ref 150–450)
POTASSIUM SERPL-SCNC: 3.8 MMOL/L (ref 3.5–5.3)
RBC # BLD AUTO: 3.64 X10*6/UL (ref 4–5.2)
SODIUM SERPL-SCNC: 141 MMOL/L (ref 136–145)
WBC # BLD AUTO: 6.6 X10*3/UL (ref 4.4–11.3)

## 2024-07-06 PROCEDURE — 82607 VITAMIN B-12: CPT | Mod: AHULAB | Performed by: NURSE PRACTITIONER

## 2024-07-06 PROCEDURE — 2500000004 HC RX 250 GENERAL PHARMACY W/ HCPCS (ALT 636 FOR OP/ED): Performed by: PHYSICAL THERAPIST

## 2024-07-06 PROCEDURE — 97165 OT EVAL LOW COMPLEX 30 MIN: CPT | Mod: GO

## 2024-07-06 PROCEDURE — 82947 ASSAY GLUCOSE BLOOD QUANT: CPT

## 2024-07-06 PROCEDURE — 2500000001 HC RX 250 WO HCPCS SELF ADMINISTERED DRUGS (ALT 637 FOR MEDICARE OP)

## 2024-07-06 PROCEDURE — 2500000001 HC RX 250 WO HCPCS SELF ADMINISTERED DRUGS (ALT 637 FOR MEDICARE OP): Performed by: PHYSICAL THERAPIST

## 2024-07-06 PROCEDURE — 82746 ASSAY OF FOLIC ACID SERUM: CPT | Mod: AHULAB | Performed by: NURSE PRACTITIONER

## 2024-07-06 PROCEDURE — 97161 PT EVAL LOW COMPLEX 20 MIN: CPT | Mod: GP

## 2024-07-06 PROCEDURE — 36415 COLL VENOUS BLD VENIPUNCTURE: CPT

## 2024-07-06 PROCEDURE — 2060000001 HC INTERMEDIATE ICU ROOM DAILY

## 2024-07-06 PROCEDURE — 82728 ASSAY OF FERRITIN: CPT | Mod: AHULAB | Performed by: NURSE PRACTITIONER

## 2024-07-06 PROCEDURE — 80048 BASIC METABOLIC PNL TOTAL CA: CPT

## 2024-07-06 PROCEDURE — 2500000004 HC RX 250 GENERAL PHARMACY W/ HCPCS (ALT 636 FOR OP/ED): Performed by: FAMILY MEDICINE

## 2024-07-06 PROCEDURE — 85027 COMPLETE CBC AUTOMATED: CPT

## 2024-07-06 PROCEDURE — 83550 IRON BINDING TEST: CPT | Performed by: NURSE PRACTITIONER

## 2024-07-06 PROCEDURE — 2500000004 HC RX 250 GENERAL PHARMACY W/ HCPCS (ALT 636 FOR OP/ED)

## 2024-07-06 RX ORDER — MORPHINE SULFATE 2 MG/ML
2 INJECTION, SOLUTION INTRAMUSCULAR; INTRAVENOUS EVERY 4 HOURS PRN
Status: DISCONTINUED | OUTPATIENT
Start: 2024-07-06 | End: 2024-07-11 | Stop reason: HOSPADM

## 2024-07-06 RX ORDER — OXYCODONE HYDROCHLORIDE 5 MG/1
5 TABLET ORAL EVERY 4 HOURS PRN
Status: DISCONTINUED | OUTPATIENT
Start: 2024-07-06 | End: 2024-07-11 | Stop reason: HOSPADM

## 2024-07-06 RX ORDER — ENOXAPARIN SODIUM 100 MG/ML
30 INJECTION SUBCUTANEOUS EVERY 24 HOURS
Status: DISCONTINUED | OUTPATIENT
Start: 2024-07-06 | End: 2024-07-06

## 2024-07-06 RX ORDER — HEPARIN SODIUM 5000 [USP'U]/ML
5000 INJECTION, SOLUTION INTRAVENOUS; SUBCUTANEOUS EVERY 8 HOURS SCHEDULED
Status: DISCONTINUED | OUTPATIENT
Start: 2024-07-06 | End: 2024-07-11 | Stop reason: HOSPADM

## 2024-07-06 RX ORDER — OXYCODONE HYDROCHLORIDE 5 MG/1
2.5 TABLET ORAL EVERY 4 HOURS PRN
Status: DISCONTINUED | OUTPATIENT
Start: 2024-07-06 | End: 2024-07-11 | Stop reason: HOSPADM

## 2024-07-06 ASSESSMENT — PAIN SCALES - GENERAL
PAINLEVEL_OUTOF10: 8
PAINLEVEL_OUTOF10: 0 - NO PAIN
PAINLEVEL_OUTOF10: 0 - NO PAIN
PAINLEVEL_OUTOF10: 7
PAINLEVEL_OUTOF10: 5 - MODERATE PAIN
PAINLEVEL_OUTOF10: 3
PAINLEVEL_OUTOF10: 7
PAINLEVEL_OUTOF10: 9
PAINLEVEL_OUTOF10: 4
PAINLEVEL_OUTOF10: 4
PAINLEVEL_OUTOF10: 0 - NO PAIN
PAINLEVEL_OUTOF10: 4
PAINLEVEL_OUTOF10: 4

## 2024-07-06 ASSESSMENT — COGNITIVE AND FUNCTIONAL STATUS - GENERAL
STANDING UP FROM CHAIR USING ARMS: A LITTLE
DRESSING REGULAR UPPER BODY CLOTHING: A LITTLE
MOVING TO AND FROM BED TO CHAIR: A LITTLE
CLIMB 3 TO 5 STEPS WITH RAILING: A LOT
HELP NEEDED FOR BATHING: A LITTLE
MOVING TO AND FROM BED TO CHAIR: A LITTLE
TOILETING: A LOT
TURNING FROM BACK TO SIDE WHILE IN FLAT BAD: A LITTLE
MOVING FROM LYING ON BACK TO SITTING ON SIDE OF FLAT BED WITH BEDRAILS: A LITTLE
STANDING UP FROM CHAIR USING ARMS: A LOT
HELP NEEDED FOR BATHING: A LOT
WALKING IN HOSPITAL ROOM: A LITTLE
TURNING FROM BACK TO SIDE WHILE IN FLAT BAD: A LITTLE
MOBILITY SCORE: 16
MOVING FROM LYING ON BACK TO SITTING ON SIDE OF FLAT BED WITH BEDRAILS: A LITTLE
DRESSING REGULAR LOWER BODY CLOTHING: A LOT
MOVING TO AND FROM BED TO CHAIR: A LITTLE
WALKING IN HOSPITAL ROOM: A LOT
MOVING FROM LYING ON BACK TO SITTING ON SIDE OF FLAT BED WITH BEDRAILS: A LITTLE
DRESSING REGULAR UPPER BODY CLOTHING: A LITTLE
MOBILITY SCORE: 16
TURNING FROM BACK TO SIDE WHILE IN FLAT BAD: A LITTLE
PERSONAL GROOMING: A LITTLE
DAILY ACTIVITIY SCORE: 19
DAILY ACTIVITIY SCORE: 17
TOILETING: A LITTLE
TOILETING: A LOT
DRESSING REGULAR LOWER BODY CLOTHING: A LOT
DAILY ACTIVITIY SCORE: 16
DRESSING REGULAR UPPER BODY CLOTHING: A LITTLE
CLIMB 3 TO 5 STEPS WITH RAILING: TOTAL
MOBILITY SCORE: 15
STANDING UP FROM CHAIR USING ARMS: A LITTLE
WALKING IN HOSPITAL ROOM: A LITTLE
DRESSING REGULAR LOWER BODY CLOTHING: A LOT
CLIMB 3 TO 5 STEPS WITH RAILING: TOTAL
HELP NEEDED FOR BATHING: A LOT

## 2024-07-06 ASSESSMENT — PAIN DESCRIPTION - LOCATION
LOCATION: HIP
LOCATION: HIP
LOCATION: LEG

## 2024-07-06 ASSESSMENT — PAIN DESCRIPTION - DESCRIPTORS
DESCRIPTORS: ACHING

## 2024-07-06 ASSESSMENT — ACTIVITIES OF DAILY LIVING (ADL)
ADL_ASSISTANCE: INDEPENDENT
BATHING_ASSISTANCE: MODERATE
ADL_ASSISTANCE: INDEPENDENT

## 2024-07-06 ASSESSMENT — PAIN DESCRIPTION - ORIENTATION
ORIENTATION: RIGHT

## 2024-07-06 ASSESSMENT — PAIN SCALES - WONG BAKER: WONGBAKER_NUMERICALRESPONSE: NO HURT

## 2024-07-06 NOTE — PROGRESS NOTES
Physical Therapy    Physical Therapy Evaluation    Patient Name: Maci Catalan  MRN: [id number]  Today's Date: 7/6/2024   Time Calculation  Start Time: 0848  Stop Time: 0910  Time Calculation (min): 22 min    Assessment/Plan   PT Assessment  PT Assessment Results: Decreased strength, Decreased endurance, Impaired balance, Decreased mobility, Pain  Rehab Prognosis: Good  Evaluation/Treatment Tolerance: Patient tolerated treatment well  Medical Staff Made Aware: Yes  Strengths: Ability to acquire knowledge, Attitude of self  End of Session Communication: Bedside nurse  Assessment Comment: Eval complete: bed mobility min assist, transfers CGA, gait CGA. Functional mobility limited by weakness, fatigue, pain, and decreased balance. Recommend low intensity therapy to address deficits and facilitate a safe return to independence home alone. After ambulation with an adult rolling walker, patient was refitted for a isael rolling walker  End of Session Patient Position: Up in chair, Alarm on  IP OR SWING BED PT PLAN  Inpatient or Swing Bed: Inpatient  PT Plan  PT Plan: Ongoing PT  PT Frequency: 5 times per week  PT Discharge Recommendations: Moderate intensity level of continued care  Equipment Recommended upon Discharge: Wheeled walker  PT Recommended Transfer Status: Contact guard  PT - OK to Discharge: Yes      Subjective   General Visit Information:  General  Reason for Referral: Right hip fx, s/p ORIF with trochanteric nail on 7/5/24 after trip and fall on 6/29/24  Referred By: KAYDEN Bolton  Past Medical History Relevant to Rehab: PMH: NSTEMI, pacemaker, AAA, pancreatic mass s/p whipple  Family/Caregiver Present: No  Co-Treatment: OT  Co-Treatment Reason: to maximize pt indep/safety needed for OOB activity  Prior to Session Communication: Bedside nurse  Patient Position Received: Bed, 3 rail up, Alarm off, not on at start of session  General Comment: RN ok'd session. Pt very pleasant, cooperative with  activity.  Home Living:  Home Living  Type of Home: Apartment  Lives With: Alone  Home Adaptive Equipment: Walker rolling or standard  Home Layout: One level  Home Access: Level entry  Prior Level of Function:  Prior Function Per Pt/Caregiver Report  Level of [place]: Independent with ADLs and functional transfers  ADL Assistance: Independent  Homemaking Assistance: Independent  Ambulatory Assistance: Independent  Precautions:  Precautions  LE Weight Bearing Status: Weight Bearing as Tolerated  Medical Precautions: Fall precautions  Precautions Comment: Telemetry, 3L O2  Vital Signs:  Vital Signs  Heart Rate: 89  SpO2: 92 %    Objective   Pain:  Pain Assessment  Pain Assessment: 0-10  0-10 (Numeric) Pain Score: 4  Pain Type: Acute pain, Surgical pain  Pain Location: Hip  Pain Orientation: Right  Pain Interventions: Repositioned, Rest  Cognition:  Cognition  Overall Cognitive Status: Within Functional Limits    General Assessments:                  Static Sitting Balance  Static Sitting-Balance Support: Bilateral upper extremity supported  Static Sitting-Level of Assistance: Close supervision    Static Standing Balance  Static Standing-Balance Support: Bilateral upper extremity supported  Static Standing-Level of Assistance: Close supervision  Functional Assessments:  Bed Mobility  Bed Mobility: Yes  Bed Mobility 1  Bed Mobility 1: Supine to sitting  Level of Assistance 1: Minimum assistance  Bed Mobility Comments 1: assist with right LE    Transfers  Transfer: Yes  Transfer 1  Transfer From 1: Bed to  Transfer to 1: Stand  Technique 1: Sit to stand, Stand to sit  Transfer Device 1: Gait belt, Walker  Transfer Level of Assistance 1: Contact guard  Trials/Comments 1: cues for weight shift, push off, and posture    Ambulation/Gait Training  Ambulation/Gait Training Performed: Yes  Ambulation/Gait Training 1  Surface 1: Level tile  Device 1: Rolling walker  Gait Support Devices: Gait belt  Assistance 1: Contact  guard, Minimal verbal cues  Quality of Gait 1: Decreased step length, Diminished heel strike (decreased nohemi)  Comments/Distance (ft) 1: 40  Extremity/Trunk Assessments:  RLE   RLE : Exceptions to WFL  AROM RLE (degrees)  RLE AROM Comment: WFL  Strength RLE  R Hip Flexion: 1/5  R Knee Extension: 3/5  R Ankle Dorsiflexion: 3+/5  R Ankle Plantar Flexion: 3+/5  LLE   LLE : Within Functional Limits  Outcome Measures:       Encounter Problems       Encounter Problems (Active)       Mobility       STG - Patient will ambulate 150 ft mod I with rolling walker (Progressing)       Start:  07/06/24    Expected End:  07/20/24               PT Transfers       STG - Patient will perform bed mobility mod I (Progressing)       Start:  07/06/24    Expected End:  07/20/24            STG - Patient will transfer sit to and from stand mod I (Progressing)       Start:  07/06/24    Expected End:  07/20/24               Pain - Adult              Education Documentation  Mobility Training, taught by Colten Rojas, PT at 7/6/2024 10:10 AM.  Learner: Patient  Readiness: Acceptance  Method: Explanation  Response: Verbalizes Understanding    Education Comments  No comments found.

## 2024-07-06 NOTE — PROGRESS NOTES
Maci Catalan is a 89 y.o. female on day 2 of admission presenting with Closed right hip fracture, initial encounter (Multi).    Subjective   Patient examined at bedside. Pain is well controlled. Denies N/T down the operative extremity.         Objective     Physical Exam    Right Lower Extremity:   -Skin intact  -dressing is clean, dry and intact  -Fires DF/PF/EHL/FHL  -SILT in saph/sural/SPN/DPN distributions  -Foot warm, well perfused  -Palpable DP pulse, brisk cap refill  -Compartments soft and compressible      Last Recorded Vitals  Blood pressure 142/62, pulse 109, temperature 36.9 °C (98.4 °F), resp. rate 16, height 1.524 m (5'), weight 45.8 kg (100 lb 15.5 oz), SpO2 97%.  Intake/Output last 3 Shifts:  I/O last 3 completed shifts:  In: 3033.2 (66.2 mL/kg) [P.O.:600; I.V.:2383.2 (52 mL/kg); IV Piggyback:50]  Out: 50 (1.1 mL/kg) [Blood:50]  Weight: 45.8 kg     Relevant Results                             Assessment/Plan   Principal Problem:    Closed right hip fracture, initial encounter (Multi)  Active Problems:    Benign essential HTN    3-vessel coronary artery disease    Diabetes mellitus type 2, noninsulin dependent (Multi)    Red blood cell antibody positive    History of endovascular stent graft for abdominal aortic aneurysm (AAA)    Pacemaker    Patient is a 89F who is s/p Right IMN fixation with Dr. Barton on 7/5.      Plan:  Medicine primary  WBAT of the operative extremity  Ancef x 3 doses post operatively  DVT PPX: per primary.  Recommend ASA 81mg BID starting POD1 for 42 days  Pain control with multiple modalities  Regular diet  Soft dressings until follow up visit  Resume home medications  PT/OT consulted.  Appreciate recommendations    At this time, the orthopedic team will sign off the patient.  We will continue to follow the patient peripherally while in the hospital. Please reach out to the on call team if there is any further questions.      Will Discuss with Dr. Malik Shankar  [place], DO  Orthopedic Surgery, PGY4           Raad Montgomery, DO

## 2024-07-06 NOTE — SIGNIFICANT EVENT
7000 Physician Certification        As the individual's attending physician , I certify that the above-named patient:  Is being discharged to a nursing facility directly from a hospital after receiving acute patient care at the hospital, and  Requires nursing facility services for the condition for which he/she received care in the hospital, and  Requires fewer than 30 days of nursing facility services, no later than the date of discharge.

## 2024-07-06 NOTE — PROGRESS NOTES
07/06/24 1138   Discharge Planning   Patient expects to be discharged to: patient is rec mod. per pt, they feel if she goes home, and has assitance for a few time, and hhc, will be fine. provided them a list of fac within [employer]. they requested referral to [facility], i did not see it withint insurance network, but sent referral anyway     Maci Catalan is a 89 y.o. female on day 2 of admission presenting with Closed right hip fracture, initial encounter (Multi).    Jeanne Jnoes RN    [facility] does not know if they are in network;  will not know until Monday. Asked patient/ daughter to look at list to give me 3 choices

## 2024-07-06 NOTE — PROGRESS NOTES
Occupational Therapy    Evaluation    Patient Name: Maci Catalan  MRN: [id number]  Today's Date: 7/6/2024  Time Calculation  Start Time: 0858  Stop Time: 0911  Time Calculation (min): 13 min        Assessment:  OT Assessment: Pt 89 yo F to ED with R hip fx s/p ORIF with nailing 7/5 by Dr. Barton. Pt currently at CGA-MOD A level for ADLs/mobility. Limited by decreased balance with new use of AD, overall weakness/fatigue, increased pain, and decreased functional reach needed for LB ADLs. Pt to benefit from add't OT skilled services to maximize indep/safety needed for occupational participation.  Prognosis: Good  Evaluation/Treatment Tolerance: Patient tolerated treatment well  Medical Staff Made Aware: Yes  End of Session Communication: Bedside nurse  End of Session Patient Position: Up in chair, Alarm off, not on at start of session  OT Assessment Results: Decreased ADL status, Decreased IADLs  Prognosis: Good  Evaluation/Treatment Tolerance: Patient tolerated treatment well  Medical Staff Made Aware: Yes  Plan:  Treatment Interventions: ADL retraining, Functional transfer training, UE strengthening/ROM, Patient/family training  OT Frequency: 3 times per week  OT Discharge Recommendations: Moderate intensity level of continued care  OT Recommended Transfer Status: Minimal assist, Assist of 1  OT - OK to Discharge: Yes (per OT POC)  Treatment Interventions: ADL retraining, Functional transfer training, UE strengthening/ROM, Patient/family training    Subjective   Current Problem:  1. Closed right hip fracture, initial encounter (Multi)  Case Request Operating Room: Hip Fracture ORIF w/ Nail Trochanteric    Case Request Operating Room: Hip Fracture ORIF w/ Nail Trochanteric    FL less than 1 hour    FL less than 1 hour    CANCELED: Case Request Operating Room: Hip Fracture ORIF w/ Nail Trochanteric    CANCELED: Case Request Operating Room: Hip Fracture ORIF w/ Nail Trochanteric        General:  General  Reason for  Referral: Pt with recent fall, to ED with negative imaging, d/c home. Returned to ED with worsening hip pain, found to have R hip fx- s/p ORIF w/ nailing by Dr. Barton 7/5  Referred By: OT 7/5 Mick  Past Medical History Relevant to Rehab: PMH: NSTEMI, pacemaker, AAA, pancreatic mass s/p whipple  Family/Caregiver Present: No  Co-Treatment: PT  Co-Treatment Reason: to maximize pt indep/safety needed for OOB activity  Prior to Session Communication: Bedside nurse  Patient Position Received: Bed, 3 rail up, Alarm off, not on at start of session  General Comment: RN ok'd session. Pt very pleasant, cooperative with activity.  Precautions:  LE Weight Bearing Status: Weight Bearing as Tolerated  Medical Precautions: Fall precautions  Precautions Comment: Telemetry, 3L O2     Pain:  Pain Assessment  Pain Assessment: 0-10  0-10 (Numeric) Pain Score: 4  Pain Type: Acute pain, Surgical pain  Pain Location: Hip  Pain Orientation: Right  Patient's Stated Pain Goal: 1  Pain Interventions: Repositioned, Ambulation/increased activity    Objective   Cognition:  Overall Cognitive Status: Within Functional Limits  Attention: Within Functional Limits  Memory: Within Funtional Limits     Home Living:  Type of Home: Apartment  Lives With: Alone  Home Adaptive Equipment: Walker rolling or standard  Home Layout: One level  Home Access: Level entry  Bathroom Shower/Tub: Walk-in shower  Bathroom Toilet: Standard  Home Living Comments: 1st level apt, no stairs to manage  Prior Function:  Level of [place]: Independent with ADLs and functional transfers  ADL Assistance: Independent  Homemaking Assistance: Independent  Ambulatory Assistance: Independent (not using AD for mobility)    ADL:  Eating Assistance: Independent  Grooming Assistance: Stand by  Bathing Assistance: Moderate  UE Dressing Assistance: Stand by  LE Dressing Assistance: Moderate  Toileting Assistance with Device: Minimal  Functional Assistance: Minimal    Bed  Mobility/Transfers: Bed Mobility  Bed Mobility: Yes  Bed Mobility 1  Bed Mobility 1: Supine to sitting  Level of Assistance 1: Minimum assistance  Bed Mobility Comments 1: assist with RLE to EOB/slight assist with trunk mgmt into sitting position    Transfers  Transfer: Yes  Transfer 1  Transfer From 1: Sit to  Transfer to 1: Stand  Technique 1: Sit to stand, Stand to sit  Transfer Device 1: Gait belt, Walker  Transfer Level of Assistance 1: Contact guard  Trials/Comments 1: cues for safe hand placement from bed vs pulling up on walker    Functional Mobility:  Functional Mobility  Functional Mobility Performed: Yes  Functional Mobility 1  Device 1: Rolling walker  Functional Mobility Support Devices: Gait belt  Assistance 1: Contact guard  Comments 1: Able to complete short functional household distance mobility in room. decreased pace d/t stiffness. overall good tolerance to pain levels.      Coordination:  Movements are Fluid and Coordinated: Yes   Hand Function:  Gross Grasp: Functional  Coordination: Functional  Extremities: RUE   RUE : Within Functional Limits and LUE   LUE: Within Functional Limits    Outcome Measures:[facility] Daily Activity  Putting on and taking off regular lower body clothing: A lot  Bathing (including washing, rinsing, drying): A lot  Putting on and taking off regular upper body clothing: A little  Toileting, which includes using toilet, bedpan or urinal: A lot  Taking care of personal grooming such as brushing teeth: A little  Eating Meals: None  Daily Activity - Total Score: 16    Education Documentation  Body Mechanics, taught by Monika Renner OT at 7/6/2024  9:59 AM.  Learner: Patient  Readiness: Acceptance  Method: Explanation, Demonstration  Response: Verbalizes Understanding    Precautions, taught by Monika Renner OT at 7/6/2024  9:59 AM.  Learner: Patient  Readiness: Acceptance  Method: Explanation, Demonstration  Response: Verbalizes Understanding    ADL Training, taught by Monika  YULIA Renner OT at 7/6/2024  9:59 AM.  Learner: Patient  Readiness: Acceptance  Method: Explanation, Demonstration  Response: Verbalizes Understanding    Education Comments  No comments found.    Goals:  Encounter Problems       Encounter Problems (Active)       ADLs       Patient with complete upper body dressing with modified independent level of assistance donning and doffing all UE clothes with PRN adaptive equipment (Progressing)       Start:  07/06/24    Expected End:  07/20/24            Patient with complete lower body dressing with modified independent level of assistance donning and doffing all LE clothing with PRN adaptive equipment (Progressing)       Start:  07/06/24    Expected End:  07/20/24            Patient will complete daily grooming tasks with modified independent level of assistance and PRN adaptive equipment. (Progressing)       Start:  07/06/24    Expected End:  07/20/24            Patient will complete toileting including hygiene clothing management/hygiene with modified independent level of assistance and PRN adaptive equipment. (Progressing)       Start:  07/06/24    Expected End:  07/20/24               MOBILITY       Patient will perform Functional mobility Household distances/Community Distances with modified independent level of assistance and front wheeled walker in order to improve safety and functional mobility. (Progressing)       Start:  07/06/24    Expected End:  07/20/24

## 2024-07-07 VITALS
BODY MASS INDEX: 19.82 KG/M2 | HEART RATE: 80 BPM | RESPIRATION RATE: 18 BRPM | SYSTOLIC BLOOD PRESSURE: 123 MMHG | WEIGHT: 100.97 LBS | OXYGEN SATURATION: 95 % | DIASTOLIC BLOOD PRESSURE: 72 MMHG | TEMPERATURE: 99.2 F | HEIGHT: 60 IN

## 2024-07-07 LAB
ANION GAP SERPL CALC-SCNC: 13 MMOL/L (ref 10–20)
BUN SERPL-MCNC: 29 MG/DL (ref 6–23)
CALCIUM SERPL-MCNC: 8.2 MG/DL (ref 8.6–10.3)
CHLORIDE SERPL-SCNC: 107 MMOL/L (ref 98–107)
CO2 SERPL-SCNC: 20 MMOL/L (ref 21–32)
CREAT SERPL-MCNC: 1.8 MG/DL (ref 0.5–1.05)
EGFRCR SERPLBLD CKD-EPI 2021: 27 ML/MIN/1.73M*2
ERYTHROCYTE [DISTWIDTH] IN BLOOD BY AUTOMATED COUNT: 16.5 % (ref 11.5–14.5)
GLUCOSE BLD MANUAL STRIP-MCNC: 188 MG/DL (ref 74–99)
GLUCOSE BLD MANUAL STRIP-MCNC: 193 MG/DL (ref 74–99)
GLUCOSE BLD MANUAL STRIP-MCNC: 216 MG/DL (ref 74–99)
GLUCOSE BLD MANUAL STRIP-MCNC: 224 MG/DL (ref 74–99)
GLUCOSE SERPL-MCNC: 204 MG/DL (ref 74–99)
HCT VFR BLD AUTO: 24.8 % (ref 36–46)
HGB BLD-MCNC: 7.7 G/DL (ref 12–16)
MCH RBC QN AUTO: 22.9 PG (ref 26–34)
MCHC RBC AUTO-ENTMCNC: 31 G/DL (ref 32–36)
MCV RBC AUTO: 74 FL (ref 80–100)
NRBC BLD-RTO: 0 /100 WBCS (ref 0–0)
PLATELET # BLD AUTO: 208 X10*3/UL (ref 150–450)
POTASSIUM SERPL-SCNC: 3.5 MMOL/L (ref 3.5–5.3)
RBC # BLD AUTO: 3.36 X10*6/UL (ref 4–5.2)
SODIUM SERPL-SCNC: 136 MMOL/L (ref 136–145)
WBC # BLD AUTO: 5.6 X10*3/UL (ref 4.4–11.3)

## 2024-07-07 PROCEDURE — 82947 ASSAY GLUCOSE BLOOD QUANT: CPT

## 2024-07-07 PROCEDURE — 2500000001 HC RX 250 WO HCPCS SELF ADMINISTERED DRUGS (ALT 637 FOR MEDICARE OP): Performed by: PHYSICAL THERAPIST

## 2024-07-07 PROCEDURE — 85027 COMPLETE CBC AUTOMATED: CPT | Performed by: FAMILY MEDICINE

## 2024-07-07 PROCEDURE — 2500000001 HC RX 250 WO HCPCS SELF ADMINISTERED DRUGS (ALT 637 FOR MEDICARE OP)

## 2024-07-07 PROCEDURE — 2500000004 HC RX 250 GENERAL PHARMACY W/ HCPCS (ALT 636 FOR OP/ED): Performed by: FAMILY MEDICINE

## 2024-07-07 PROCEDURE — 80048 BASIC METABOLIC PNL TOTAL CA: CPT | Performed by: FAMILY MEDICINE

## 2024-07-07 PROCEDURE — 36415 COLL VENOUS BLD VENIPUNCTURE: CPT | Performed by: FAMILY MEDICINE

## 2024-07-07 PROCEDURE — 2060000001 HC INTERMEDIATE ICU ROOM DAILY

## 2024-07-07 ASSESSMENT — COGNITIVE AND FUNCTIONAL STATUS - GENERAL
DRESSING REGULAR LOWER BODY CLOTHING: A LOT
DAILY ACTIVITIY SCORE: 17
MOVING TO AND FROM BED TO CHAIR: A LITTLE
TURNING FROM BACK TO SIDE WHILE IN FLAT BAD: A LITTLE
DRESSING REGULAR UPPER BODY CLOTHING: A LITTLE
DAILY ACTIVITIY SCORE: 17
MOBILITY SCORE: 16
MOBILITY SCORE: 17
MOVING FROM LYING ON BACK TO SITTING ON SIDE OF FLAT BED WITH BEDRAILS: A LITTLE
HELP NEEDED FOR BATHING: A LOT
CLIMB 3 TO 5 STEPS WITH RAILING: TOTAL
TURNING FROM BACK TO SIDE WHILE IN FLAT BAD: A LITTLE
CLIMB 3 TO 5 STEPS WITH RAILING: TOTAL
WALKING IN HOSPITAL ROOM: A LITTLE
TOILETING: A LITTLE
STANDING UP FROM CHAIR USING ARMS: A LITTLE
DAILY ACTIVITIY SCORE: 18
MOVING FROM LYING ON BACK TO SITTING ON SIDE OF FLAT BED WITH BEDRAILS: A LITTLE
WALKING IN HOSPITAL ROOM: A LITTLE
DRESSING REGULAR UPPER BODY CLOTHING: A LITTLE
TOILETING: A LOT
WALKING IN HOSPITAL ROOM: A LITTLE
WALKING IN HOSPITAL ROOM: A LITTLE
DRESSING REGULAR UPPER BODY CLOTHING: A LITTLE
DAILY ACTIVITIY SCORE: 17
DRESSING REGULAR UPPER BODY CLOTHING: A LITTLE
MOBILITY SCORE: 16
DRESSING REGULAR LOWER BODY CLOTHING: A LOT
MOVING TO AND FROM BED TO CHAIR: A LITTLE
STANDING UP FROM CHAIR USING ARMS: A LITTLE
MOVING FROM LYING ON BACK TO SITTING ON SIDE OF FLAT BED WITH BEDRAILS: A LITTLE
MOVING FROM LYING ON BACK TO SITTING ON SIDE OF FLAT BED WITH BEDRAILS: A LITTLE
DRESSING REGULAR UPPER BODY CLOTHING: A LITTLE
WALKING IN HOSPITAL ROOM: A LITTLE
TURNING FROM BACK TO SIDE WHILE IN FLAT BAD: A LITTLE
TURNING FROM BACK TO SIDE WHILE IN FLAT BAD: A LITTLE
CLIMB 3 TO 5 STEPS WITH RAILING: TOTAL
DRESSING REGULAR LOWER BODY CLOTHING: A LOT
TOILETING: A LOT
TOILETING: A LOT
HELP NEEDED FOR BATHING: A LOT
WALKING IN HOSPITAL ROOM: A LITTLE
TOILETING: A LOT
STANDING UP FROM CHAIR USING ARMS: A LITTLE
CLIMB 3 TO 5 STEPS WITH RAILING: TOTAL
STANDING UP FROM CHAIR USING ARMS: A LITTLE
DRESSING REGULAR LOWER BODY CLOTHING: A LOT
DRESSING REGULAR LOWER BODY CLOTHING: A LOT
STANDING UP FROM CHAIR USING ARMS: A LITTLE
DAILY ACTIVITIY SCORE: 17
MOVING TO AND FROM BED TO CHAIR: A LITTLE
STANDING UP FROM CHAIR USING ARMS: A LITTLE
HELP NEEDED FOR BATHING: A LOT
DRESSING REGULAR UPPER BODY CLOTHING: A LITTLE
MOBILITY SCORE: 16
DRESSING REGULAR UPPER BODY CLOTHING: A LITTLE
TURNING FROM BACK TO SIDE WHILE IN FLAT BAD: A LITTLE
MOVING TO AND FROM BED TO CHAIR: A LITTLE
MOBILITY SCORE: 16
DAILY ACTIVITIY SCORE: 17
CLIMB 3 TO 5 STEPS WITH RAILING: TOTAL
DRESSING REGULAR LOWER BODY CLOTHING: A LOT
DRESSING REGULAR LOWER BODY CLOTHING: A LOT
MOBILITY SCORE: 16
MOVING FROM LYING ON BACK TO SITTING ON SIDE OF FLAT BED WITH BEDRAILS: A LITTLE
TURNING FROM BACK TO SIDE WHILE IN FLAT BAD: A LITTLE
CLIMB 3 TO 5 STEPS WITH RAILING: TOTAL
TURNING FROM BACK TO SIDE WHILE IN FLAT BAD: A LITTLE
HELP NEEDED FOR BATHING: A LOT
HELP NEEDED FOR BATHING: A LOT
MOVING FROM LYING ON BACK TO SITTING ON SIDE OF FLAT BED WITH BEDRAILS: A LITTLE
DAILY ACTIVITIY SCORE: 17
WALKING IN HOSPITAL ROOM: A LITTLE
TOILETING: A LOT
MOVING TO AND FROM BED TO CHAIR: A LITTLE
CLIMB 3 TO 5 STEPS WITH RAILING: TOTAL
MOBILITY SCORE: 16
HELP NEEDED FOR BATHING: A LOT
TOILETING: A LOT
MOVING TO AND FROM BED TO CHAIR: A LITTLE
HELP NEEDED FOR BATHING: A LOT
MOVING TO AND FROM BED TO CHAIR: A LITTLE
STANDING UP FROM CHAIR USING ARMS: A LITTLE

## 2024-07-07 ASSESSMENT — PAIN DESCRIPTION - DESCRIPTORS: DESCRIPTORS: ACHING

## 2024-07-07 ASSESSMENT — PAIN - FUNCTIONAL ASSESSMENT
PAIN_FUNCTIONAL_ASSESSMENT: 0-10

## 2024-07-07 ASSESSMENT — PAIN DESCRIPTION - LOCATION: LOCATION: HIP

## 2024-07-07 ASSESSMENT — PAIN SCALES - WONG BAKER: WONGBAKER_NUMERICALRESPONSE: HURTS EVEN MORE

## 2024-07-07 ASSESSMENT — PAIN SCALES - GENERAL
PAINLEVEL_OUTOF10: 0 - NO PAIN
PAINLEVEL_OUTOF10: 9
PAINLEVEL_OUTOF10: 7
PAINLEVEL_OUTOF10: 0 - NO PAIN

## 2024-07-07 ASSESSMENT — PAIN DESCRIPTION - ORIENTATION: ORIENTATION: RIGHT

## 2024-07-07 NOTE — CARE PLAN
The patient's goals for the shift include      The clinical goals for the shift include pain control    Progressing. Pain well controlled. Ambulated to bathroom twice overnight. Tolerated well.

## 2024-07-07 NOTE — PROGRESS NOTES
07/07/24 1023   Discharge Planning   Who is requesting discharge planning? Provider   Home or Post Acute Services Post acute facilities (Rehab/SNF/etc)   Type of Post Acute Facility Services Skilled nursing   Patient expects to be discharged to: SNF   Does the patient need discharge transport arranged? Yes   RoundTrip coordination needed? Yes   Has discharge transport been arranged? No     7/7/24 1023  [facility] network with patient's insurance until Monday. Patient provided additional choice of [facility].  Referral sent.  Stacie Miranda RN [facility]

## 2024-07-07 NOTE — PROGRESS NOTES
Maci Catalan is a 89 y.o. female on day 2 of admission presenting with Closed right hip fracture, initial encounter (Multi).      Subjective   No pain   She is able to walk with walker  At baseline does not use cane or walker       Objective     Last Recorded Vitals  BP 96/57 (BP Location: Right arm, Patient Position: Lying)   Pulse 91   Temp 37.3 °C (99.1 °F) (Temporal)   Resp 16   Wt 45.8 kg (100 lb 15.5 oz)   SpO2 100%   Intake/Output last 3 Shifts:    Intake/Output Summary (Last 24 hours) at 7/6/2024 2054  Last data filed at 7/6/2024 1539  Gross per 24 hour   Intake 2357.5 ml   Output 300 ml   Net 2057.5 ml       Admission Weight  Weight: 45.8 kg (100 lb 15.5 oz) (07/04/24 1627)    Daily Weight  07/04/24 : 45.8 kg (100 lb 15.5 oz)    Image Results  FL less than 1 hour  These images are not reportable by radiology and will not be interpreted   by  Radiologists.  ECG 12 Lead  Atrial-sensed ventricular-paced rhythm  Abnormal ECG  When compared with ECG of 09-APR-2024 11:17,  Vent. rate has increased BY   3 BPM      Physical Exam  Well developed well nurished  No distress  Ao  Face symmetrical   Neck no jvd no bruit  Chest clear  CVS regular  Ext no edema  Abdo soft nontender bs active, no masses  Cns alert appropriate able to move ext   Skin intact  Psych normal affect    Relevant Results  Scheduled medications  fluticasone, 1 spray, Each Nostril, Daily  [Held by provider] furosemide, 40 mg, oral, Daily  heparin (porcine), 5,000 Units, subcutaneous, q8h LSIA  [Held by provider] lisinopril, 2.5 mg, oral, Daily  melatonin, 9 mg, oral, Nightly  methocarbamol, 500 mg, oral, 4x daily  metoprolol tartrate, 25 mg, oral, BID  pantoprazole, 40 mg, oral, Daily before breakfast  sucralfate, 1 g, oral, 4x daily      Continuous medications  D5 % and 0.9 % sodium chloride, 50 mL/hr, Last Rate: 50 mL/hr (07/06/24 1539)      PRN medications  PRN medications: acetaminophen **OR** acetaminophen **OR** acetaminophen,  acetaminophen, morphine, nitroglycerin, ondansetron **OR** ondansetron, oxyCODONE, oxyCODONE  Results for orders placed or performed during the hospital encounter of 07/04/24 (from the past 96 hour(s))   CBC and Auto Differential   Result Value Ref Range    WBC 10.9 4.4 - 11.3 x10*3/uL    nRBC 0.0 0.0 - 0.0 /100 WBCs    RBC 4.56 4.00 - 5.20 x10*6/uL    Hemoglobin 10.4 (L) 12.0 - 16.0 g/dL    Hematocrit 34.6 (L) 36.0 - 46.0 %    MCV 76 (L) 80 - 100 fL    MCH 22.8 (L) 26.0 - 34.0 pg    MCHC 30.1 (L) 32.0 - 36.0 g/dL    RDW 16.4 (H) 11.5 - 14.5 %    Platelets 211 150 - 450 x10*3/uL    Neutrophils % 88.9 40.0 - 80.0 %    Immature Granulocytes %, Automated 0.8 0.0 - 0.9 %    Lymphocytes % 4.7 13.0 - 44.0 %    Monocytes % 4.9 2.0 - 10.0 %    Eosinophils % 0.3 0.0 - 6.0 %    Basophils % 0.4 0.0 - 2.0 %    Neutrophils Absolute 9.69 (H) 1.60 - 5.50 x10*3/uL    Immature Granulocytes Absolute, Automated 0.09 0.00 - 0.50 x10*3/uL    Lymphocytes Absolute 0.51 (L) 0.80 - 3.00 x10*3/uL    Monocytes Absolute 0.53 0.05 - 0.80 x10*3/uL    Eosinophils Absolute 0.03 0.00 - 0.40 x10*3/uL    Basophils Absolute 0.04 0.00 - 0.10 x10*3/uL   Basic metabolic panel   Result Value Ref Range    Glucose 199 (H) 74 - 99 mg/dL    Sodium 137 136 - 145 mmol/L    Potassium 3.5 3.5 - 5.3 mmol/L    Chloride 104 98 - 107 mmol/L    Bicarbonate 18 (L) 21 - 32 mmol/L    Anion Gap 19 10 - 20 mmol/L    Urea Nitrogen 57 (H) 6 - 23 mg/dL    Creatinine 2.84 (H) 0.50 - 1.05 mg/dL    eGFR 15 (L) >60 mL/min/1.73m*2    Calcium 8.6 8.6 - 10.3 mg/dL   ECG 12 Lead   Result Value Ref Range    Ventricular Rate 82 BPM    Atrial Rate 82 BPM    MO Interval 182 ms    QRS Duration 124 ms    QT Interval 406 ms    QTC Calculation(Bazett) 474 ms    P Axis 63 degrees    R Axis 4 degrees    T Axis 142 degrees    QRS Count 13 beats    Q Onset 214 ms    P Onset 123 ms    P Offset 180 ms    T Offset 417 ms    QTC Fredericia 450 ms   Protime-INR   Result Value Ref Range    Protime  11.7 9.8 - 12.8 seconds    INR 1.0 0.9 - 1.1   APTT   Result Value Ref Range    aPTT 29 27 - 38 seconds   Type and Screen   Result Value Ref Range    ABO TYPE AB     Rh TYPE POS     ANTIBODY SCREEN POS    BB ORDER ONLY - Antibody Identification   Result Value Ref Range    Antibody ID Anti-K     CASE #     Basic metabolic panel   Result Value Ref Range    Glucose 128 (H) 74 - 99 mg/dL    Sodium 141 136 - 145 mmol/L    Potassium 3.4 (L) 3.5 - 5.3 mmol/L    Chloride 107 98 - 107 mmol/L    Bicarbonate 21 21 - 32 mmol/L    Anion Gap 16 10 - 20 mmol/L    Urea Nitrogen 56 (H) 6 - 23 mg/dL    Creatinine 2.73 (H) 0.50 - 1.05 mg/dL    eGFR 16 (L) >60 mL/min/1.73m*2    Calcium 8.3 (L) 8.6 - 10.3 mg/dL   CBC   Result Value Ref Range    WBC 7.8 4.4 - 11.3 x10*3/uL    nRBC 0.0 0.0 - 0.0 /100 WBCs    RBC 4.31 4.00 - 5.20 x10*6/uL    Hemoglobin 9.8 (L) 12.0 - 16.0 g/dL    Hematocrit 33.3 (L) 36.0 - 46.0 %    MCV 77 (L) 80 - 100 fL    MCH 22.7 (L) 26.0 - 34.0 pg    MCHC 29.4 (L) 32.0 - 36.0 g/dL    RDW 16.7 (H) 11.5 - 14.5 %    Platelets 233 150 - 450 x10*3/uL   CBC   Result Value Ref Range    WBC 6.3 4.4 - 11.3 x10*3/uL    nRBC 0.0 0.0 - 0.0 /100 WBCs    RBC 4.26 4.00 - 5.20 x10*6/uL    Hemoglobin 9.7 (L) 12.0 - 16.0 g/dL    Hematocrit 33.7 (L) 36.0 - 46.0 %    MCV 79 (L) 80 - 100 fL    MCH 22.8 (L) 26.0 - 34.0 pg    MCHC 28.8 (L) 32.0 - 36.0 g/dL    RDW 16.7 (H) 11.5 - 14.5 %    Platelets 229 150 - 450 x10*3/uL   Basic Metabolic Panel   Result Value Ref Range    Glucose 105 (H) 74 - 99 mg/dL    Sodium 140 136 - 145 mmol/L    Potassium 3.5 3.5 - 5.3 mmol/L    Chloride 107 98 - 107 mmol/L    Bicarbonate 18 (L) 21 - 32 mmol/L    Anion Gap 19 10 - 20 mmol/L    Urea Nitrogen 50 (H) 6 - 23 mg/dL    Creatinine 2.09 (H) 0.50 - 1.05 mg/dL    eGFR 22 (L) >60 mL/min/1.73m*2    Calcium 8.3 (L) 8.6 - 10.3 mg/dL   POCT GLUCOSE   Result Value Ref Range    POCT Glucose 124 (H) 74 - 99 mg/dL   Prepare RBC: 2 Units   Result Value Ref Range     PRODUCT CODE U9235D01     Unit Number [id number]     Unit ABO AB     Unit RH POS     XM INTEP COMP     Dispense Status XM     Blood Expiration Date July 24, 2024 23:59 EDT     PRODUCT BLOOD TYPE 8400     UNIT VOLUME 350     PRODUCT CODE N7320Z53     Unit Number [id number]     Unit ABO A     Unit RH POS     XM INTEP COMP     Dispense Status XM     Blood Expiration Date August 05, 2024 23:59 EDT     PRODUCT BLOOD TYPE 6200     UNIT VOLUME 350    POCT GLUCOSE   Result Value Ref Range    POCT Glucose 91 74 - 99 mg/dL   POCT GLUCOSE   Result Value Ref Range    POCT Glucose 189 (H) 74 - 99 mg/dL   CBC   Result Value Ref Range    WBC 6.6 4.4 - 11.3 x10*3/uL    nRBC 0.0 0.0 - 0.0 /100 WBCs    RBC 3.64 (L) 4.00 - 5.20 x10*6/uL    Hemoglobin 8.3 (L) 12.0 - 16.0 g/dL    Hematocrit 27.9 (L) 36.0 - 46.0 %    MCV 77 (L) 80 - 100 fL    MCH 22.8 (L) 26.0 - 34.0 pg    MCHC 29.7 (L) 32.0 - 36.0 g/dL    RDW 16.5 (H) 11.5 - 14.5 %    Platelets 234 150 - 450 x10*3/uL   Basic Metabolic Panel   Result Value Ref Range    Glucose 172 (H) 74 - 99 mg/dL    Sodium 141 136 - 145 mmol/L    Potassium 3.8 3.5 - 5.3 mmol/L    Chloride 109 (H) 98 - 107 mmol/L    Bicarbonate 20 (L) 21 - 32 mmol/L    Anion Gap 16 10 - 20 mmol/L    Urea Nitrogen 38 (H) 6 - 23 mg/dL    Creatinine 1.83 (H) 0.50 - 1.05 mg/dL    eGFR 26 (L) >60 mL/min/1.73m*2    Calcium 7.7 (L) 8.6 - 10.3 mg/dL   POCT GLUCOSE   Result Value Ref Range    POCT Glucose 186 (H) 74 - 99 mg/dL   POCT GLUCOSE   Result Value Ref Range    POCT Glucose 181 (H) 74 - 99 mg/dL   POCT GLUCOSE   Result Value Ref Range    POCT Glucose 295 (H) 74 - 99 mg/dL   POCT GLUCOSE   Result Value Ref Range    POCT Glucose 216 (H) 74 - 99 mg/dL                Assessment/Plan        Principal Problem:    Closed right hip fracture, initial encounter (Multi)  Active Problems:    Benign essential HTN    3-vessel coronary artery disease    Diabetes mellitus type 2, noninsulin dependent (Multi)    Red blood cell  antibody positive    History of endovascular stent graft for abdominal aortic aneurysm (AAA)    Pacemaker    Cont with dvt ppx   Avoid asa  Ot pt   Pain control   Resumed homemeds  Dc plan [facility] Monday               Jacky Hendrix MD

## 2024-07-08 ENCOUNTER — APPOINTMENT (OUTPATIENT)
Dept: CARDIOLOGY | Facility: HOSPITAL | Age: 89
DRG: 481 | End: 2024-07-08
Payer: MEDICARE

## 2024-07-08 LAB
ANION GAP SERPL CALC-SCNC: 9 MMOL/L (ref 10–20)
BLOOD EXPIRATION DATE: NORMAL
BLOOD EXPIRATION DATE: NORMAL
BUN SERPL-MCNC: 31 MG/DL (ref 6–23)
CALCIUM SERPL-MCNC: 7.7 MG/DL (ref 8.6–10.3)
CHLORIDE SERPL-SCNC: 106 MMOL/L (ref 98–107)
CO2 SERPL-SCNC: 23 MMOL/L (ref 21–32)
CREAT SERPL-MCNC: 1.85 MG/DL (ref 0.5–1.05)
DISPENSE STATUS: NORMAL
DISPENSE STATUS: NORMAL
EGFRCR SERPLBLD CKD-EPI 2021: 26 ML/MIN/1.73M*2
ERYTHROCYTE [DISTWIDTH] IN BLOOD BY AUTOMATED COUNT: 16.3 % (ref 11.5–14.5)
GLUCOSE BLD MANUAL STRIP-MCNC: 182 MG/DL (ref 74–99)
GLUCOSE BLD MANUAL STRIP-MCNC: 252 MG/DL (ref 74–99)
GLUCOSE BLD MANUAL STRIP-MCNC: 253 MG/DL (ref 74–99)
GLUCOSE SERPL-MCNC: 138 MG/DL (ref 74–99)
HCT VFR BLD AUTO: 24.6 % (ref 36–46)
HCT VFR BLD AUTO: 25.8 % (ref 36–46)
HGB BLD-MCNC: 7.3 G/DL (ref 12–16)
HGB BLD-MCNC: 7.7 G/DL (ref 12–16)
MCH RBC QN AUTO: 22.2 PG (ref 26–34)
MCHC RBC AUTO-ENTMCNC: 29.7 G/DL (ref 32–36)
MCV RBC AUTO: 75 FL (ref 80–100)
NRBC BLD-RTO: 0.4 /100 WBCS (ref 0–0)
PLATELET # BLD AUTO: 216 X10*3/UL (ref 150–450)
POTASSIUM SERPL-SCNC: 3.5 MMOL/L (ref 3.5–5.3)
PRODUCT BLOOD TYPE: 6200
PRODUCT BLOOD TYPE: 8400
PRODUCT CODE: NORMAL
PRODUCT CODE: NORMAL
RBC # BLD AUTO: 3.29 X10*6/UL (ref 4–5.2)
SODIUM SERPL-SCNC: 134 MMOL/L (ref 136–145)
UNIT ABO: NORMAL
UNIT ABO: NORMAL
UNIT NUMBER: NORMAL
UNIT NUMBER: NORMAL
UNIT RH: NORMAL
UNIT RH: NORMAL
UNIT VOLUME: 350
UNIT VOLUME: 350
WBC # BLD AUTO: 5.1 X10*3/UL (ref 4.4–11.3)
XM INTEP: NORMAL
XM INTEP: NORMAL

## 2024-07-08 PROCEDURE — 36415 COLL VENOUS BLD VENIPUNCTURE: CPT | Performed by: FAMILY MEDICINE

## 2024-07-08 PROCEDURE — 97116 GAIT TRAINING THERAPY: CPT | Mod: GP,CQ

## 2024-07-08 PROCEDURE — 97530 THERAPEUTIC ACTIVITIES: CPT | Mod: GO

## 2024-07-08 PROCEDURE — 2500000001 HC RX 250 WO HCPCS SELF ADMINISTERED DRUGS (ALT 637 FOR MEDICARE OP): Performed by: PHYSICAL THERAPIST

## 2024-07-08 PROCEDURE — 2060000001 HC INTERMEDIATE ICU ROOM DAILY

## 2024-07-08 PROCEDURE — 85027 COMPLETE CBC AUTOMATED: CPT | Performed by: FAMILY MEDICINE

## 2024-07-08 PROCEDURE — 82947 ASSAY GLUCOSE BLOOD QUANT: CPT

## 2024-07-08 PROCEDURE — 2500000004 HC RX 250 GENERAL PHARMACY W/ HCPCS (ALT 636 FOR OP/ED): Performed by: FAMILY MEDICINE

## 2024-07-08 PROCEDURE — 93005 ELECTROCARDIOGRAM TRACING: CPT

## 2024-07-08 PROCEDURE — 85018 HEMOGLOBIN: CPT | Performed by: FAMILY MEDICINE

## 2024-07-08 PROCEDURE — 80048 BASIC METABOLIC PNL TOTAL CA: CPT | Performed by: FAMILY MEDICINE

## 2024-07-08 PROCEDURE — 97535 SELF CARE MNGMENT TRAINING: CPT | Mod: GO

## 2024-07-08 PROCEDURE — 97110 THERAPEUTIC EXERCISES: CPT | Mod: GP,CQ

## 2024-07-08 RX ORDER — HEPARIN SODIUM 5000 [USP'U]/ML
5000 INJECTION, SOLUTION INTRAVENOUS; SUBCUTANEOUS EVERY 8 HOURS SCHEDULED
Status: CANCELLED
Start: 2024-07-08 | End: 2024-08-19

## 2024-07-08 ASSESSMENT — COGNITIVE AND FUNCTIONAL STATUS - GENERAL
DRESSING REGULAR LOWER BODY CLOTHING: A LOT
WALKING IN HOSPITAL ROOM: A LITTLE
MOVING FROM LYING ON BACK TO SITTING ON SIDE OF FLAT BED WITH BEDRAILS: A LITTLE
DAILY ACTIVITIY SCORE: 19
HELP NEEDED FOR BATHING: A LOT
DRESSING REGULAR UPPER BODY CLOTHING: A LITTLE
TURNING FROM BACK TO SIDE WHILE IN FLAT BAD: A LITTLE
STANDING UP FROM CHAIR USING ARMS: A LITTLE
TOILETING: A LITTLE
DRESSING REGULAR LOWER BODY CLOTHING: A LOT
MOVING FROM LYING ON BACK TO SITTING ON SIDE OF FLAT BED WITH BEDRAILS: A LITTLE
WALKING IN HOSPITAL ROOM: A LITTLE
TURNING FROM BACK TO SIDE WHILE IN FLAT BAD: A LITTLE
DAILY ACTIVITIY SCORE: 19
TOILETING: A LITTLE
MOVING TO AND FROM BED TO CHAIR: A LITTLE
STANDING UP FROM CHAIR USING ARMS: A LITTLE
HELP NEEDED FOR BATHING: A LOT
MOVING FROM LYING ON BACK TO SITTING ON SIDE OF FLAT BED WITH BEDRAILS: A LITTLE
MOVING TO AND FROM BED TO CHAIR: A LITTLE
DAILY ACTIVITIY SCORE: 18
DAILY ACTIVITIY SCORE: 19
DRESSING REGULAR LOWER BODY CLOTHING: A LOT
TURNING FROM BACK TO SIDE WHILE IN FLAT BAD: A LITTLE
TURNING FROM BACK TO SIDE WHILE IN FLAT BAD: A LITTLE
STANDING UP FROM CHAIR USING ARMS: A LITTLE
STANDING UP FROM CHAIR USING ARMS: A LITTLE
HELP NEEDED FOR BATHING: A LOT
CLIMB 3 TO 5 STEPS WITH RAILING: TOTAL
CLIMB 3 TO 5 STEPS WITH RAILING: TOTAL
DRESSING REGULAR LOWER BODY CLOTHING: A LOT
STANDING UP FROM CHAIR USING ARMS: A LITTLE
HELP NEEDED FOR BATHING: A LOT
MOVING TO AND FROM BED TO CHAIR: A LITTLE
MOVING TO AND FROM BED TO CHAIR: A LITTLE
WALKING IN HOSPITAL ROOM: A LITTLE
TOILETING: A LITTLE
CLIMB 3 TO 5 STEPS WITH RAILING: TOTAL
MOVING FROM LYING ON BACK TO SITTING ON SIDE OF FLAT BED WITH BEDRAILS: A LITTLE
MOBILITY SCORE: 16
MOVING TO AND FROM BED TO CHAIR: A LITTLE
WALKING IN HOSPITAL ROOM: A LITTLE
DRESSING REGULAR LOWER BODY CLOTHING: A LOT
HELP NEEDED FOR BATHING: A LOT
CLIMB 3 TO 5 STEPS WITH RAILING: TOTAL
HELP NEEDED FOR BATHING: A LOT
TOILETING: A LITTLE
HELP NEEDED FOR BATHING: A LOT
CLIMB 3 TO 5 STEPS WITH RAILING: TOTAL
DAILY ACTIVITIY SCORE: 19
MOBILITY SCORE: 16
MOVING FROM LYING ON BACK TO SITTING ON SIDE OF FLAT BED WITH BEDRAILS: A LITTLE
MOVING TO AND FROM BED TO CHAIR: A LITTLE
TOILETING: A LITTLE
WALKING IN HOSPITAL ROOM: A LITTLE
MOBILITY SCORE: 17
MOVING FROM LYING ON BACK TO SITTING ON SIDE OF FLAT BED WITH BEDRAILS: A LITTLE
DRESSING REGULAR LOWER BODY CLOTHING: A LOT
MOVING TO AND FROM BED TO CHAIR: A LITTLE
MOBILITY SCORE: 16
TOILETING: A LITTLE
STANDING UP FROM CHAIR USING ARMS: A LITTLE
CLIMB 3 TO 5 STEPS WITH RAILING: TOTAL
MOBILITY SCORE: 16
WALKING IN HOSPITAL ROOM: A LITTLE
WALKING IN HOSPITAL ROOM: A LITTLE
TURNING FROM BACK TO SIDE WHILE IN FLAT BAD: A LITTLE
MOBILITY SCORE: 16
TURNING FROM BACK TO SIDE WHILE IN FLAT BAD: A LITTLE
TOILETING: A LITTLE
DAILY ACTIVITIY SCORE: 19
STANDING UP FROM CHAIR USING ARMS: A LITTLE
CLIMB 3 TO 5 STEPS WITH RAILING: TOTAL
MOBILITY SCORE: 16
DAILY ACTIVITIY SCORE: 19
DRESSING REGULAR LOWER BODY CLOTHING: A LOT
TURNING FROM BACK TO SIDE WHILE IN FLAT BAD: A LITTLE

## 2024-07-08 ASSESSMENT — PAIN - FUNCTIONAL ASSESSMENT
PAIN_FUNCTIONAL_ASSESSMENT: 0-10

## 2024-07-08 ASSESSMENT — ACTIVITIES OF DAILY LIVING (ADL): HOME_MANAGEMENT_TIME_ENTRY: 24

## 2024-07-08 ASSESSMENT — PAIN SCALES - GENERAL
PAINLEVEL_OUTOF10: 0 - NO PAIN
PAINLEVEL_OUTOF10: 4
PAINLEVEL_OUTOF10: 0 - NO PAIN
PAINLEVEL_OUTOF10: 0 - NO PAIN

## 2024-07-08 NOTE — PROGRESS NOTES
07/08/24 1017   Discharge Planning   Type of Post Acute Facility Services Skilled nursing   Patient expects to be discharged to: [facility] [place]: SNF is checking to see if they are in patient's network.  If not, patient will need a new SNF.   Does the patient need discharge transport arranged? Yes   RoundTrip coordination needed? Yes   Has discharge transport been arranged? No     Kerrie Burgos RN

## 2024-07-08 NOTE — CARE PLAN
The patient's goals for the shift include      The clinical goals for the shift include Patient will remain comfortable throughout the shift.    Pain well controlled with one dose of Oxycodone overnight. Unable to janeen off oxygen. 89 % on R.A. With 1L/NC at this time, 94 %.

## 2024-07-08 NOTE — PROGRESS NOTES
Maci Catalan is a 89 y.o. female on day 3 of admission presenting with Closed right hip fracture, initial encounter (Multi).      Subjective   No pain   She is able to walk with walker  At baseline does not use cane or walker  Pain controlled  Dvt ppx is in place  No bleeding        Objective     Last Recorded Vitals  /58 (BP Location: Right arm, Patient Position: Sitting)   Pulse 107   Temp 37.4 °C (99.4 °F) (Temporal)   Resp 18   Wt 45.8 kg (100 lb 15.5 oz)   SpO2 96%   Intake/Output last 3 Shifts:    Intake/Output Summary (Last 24 hours) at 7/7/2024 2143  Last data filed at 7/7/2024 1857  Gross per 24 hour   Intake 480 ml   Output 1125 ml   Net -645 ml       Admission Weight  Weight: 45.8 kg (100 lb 15.5 oz) (07/04/24 1627)    Daily Weight  07/04/24 : 45.8 kg (100 lb 15.5 oz)    Image Results  FL less than 1 hour  These images are not reportable by radiology and will not be interpreted   by  Radiologists.  ECG 12 Lead  Atrial-sensed ventricular-paced rhythm  Abnormal ECG  When compared with ECG of 09-APR-2024 11:17,  Vent. rate has increased BY   3 BPM      Physical Exam  Well developed well nurished  No distress  Ao  Face symmetrical   Neck no jvd no bruit  Chest clear  CVS regular  Ext no edema  Abdo soft nontender bs active, no masses  Cns alert appropriate able to move ext   Skin intact  Psych normal affect    Relevant Results  Scheduled medications  fluticasone, 1 spray, Each Nostril, Daily  [Held by provider] furosemide, 40 mg, oral, Daily  heparin (porcine), 5,000 Units, subcutaneous, q8h LISA  [Held by provider] lisinopril, 2.5 mg, oral, Daily  melatonin, 9 mg, oral, Nightly  methocarbamol, 500 mg, oral, 4x daily  metoprolol tartrate, 25 mg, oral, BID  pantoprazole, 40 mg, oral, Daily before breakfast  sucralfate, 1 g, oral, 4x daily      Continuous medications  D5 % and 0.9 % sodium chloride, 50 mL/hr, Last Rate: 50 mL/hr (07/06/24 1539)      PRN medications  PRN medications:  acetaminophen **OR** acetaminophen **OR** acetaminophen, acetaminophen, morphine, nitroglycerin, ondansetron **OR** ondansetron, oxyCODONE, oxyCODONE  Results for orders placed or performed during the hospital encounter of 07/04/24 (from the past 96 hour(s))   CBC and Auto Differential   Result Value Ref Range    WBC 10.9 4.4 - 11.3 x10*3/uL    nRBC 0.0 0.0 - 0.0 /100 WBCs    RBC 4.56 4.00 - 5.20 x10*6/uL    Hemoglobin 10.4 (L) 12.0 - 16.0 g/dL    Hematocrit 34.6 (L) 36.0 - 46.0 %    MCV 76 (L) 80 - 100 fL    MCH 22.8 (L) 26.0 - 34.0 pg    MCHC 30.1 (L) 32.0 - 36.0 g/dL    RDW 16.4 (H) 11.5 - 14.5 %    Platelets 211 150 - 450 x10*3/uL    Neutrophils % 88.9 40.0 - 80.0 %    Immature Granulocytes %, Automated 0.8 0.0 - 0.9 %    Lymphocytes % 4.7 13.0 - 44.0 %    Monocytes % 4.9 2.0 - 10.0 %    Eosinophils % 0.3 0.0 - 6.0 %    Basophils % 0.4 0.0 - 2.0 %    Neutrophils Absolute 9.69 (H) 1.60 - 5.50 x10*3/uL    Immature Granulocytes Absolute, Automated 0.09 0.00 - 0.50 x10*3/uL    Lymphocytes Absolute 0.51 (L) 0.80 - 3.00 x10*3/uL    Monocytes Absolute 0.53 0.05 - 0.80 x10*3/uL    Eosinophils Absolute 0.03 0.00 - 0.40 x10*3/uL    Basophils Absolute 0.04 0.00 - 0.10 x10*3/uL   Basic metabolic panel   Result Value Ref Range    Glucose 199 (H) 74 - 99 mg/dL    Sodium 137 136 - 145 mmol/L    Potassium 3.5 3.5 - 5.3 mmol/L    Chloride 104 98 - 107 mmol/L    Bicarbonate 18 (L) 21 - 32 mmol/L    Anion Gap 19 10 - 20 mmol/L    Urea Nitrogen 57 (H) 6 - 23 mg/dL    Creatinine 2.84 (H) 0.50 - 1.05 mg/dL    eGFR 15 (L) >60 mL/min/1.73m*2    Calcium 8.6 8.6 - 10.3 mg/dL   ECG 12 Lead   Result Value Ref Range    Ventricular Rate 82 BPM    Atrial Rate 82 BPM    NH Interval 182 ms    QRS Duration 124 ms    QT Interval 406 ms    QTC Calculation(Bazett) 474 ms    P Axis 63 degrees    R Axis 4 degrees    T Axis 142 degrees    QRS Count 13 beats    Q Onset 214 ms    P Onset 123 ms    P Offset 180 ms    T Offset 417 ms    QTC Fredericia 450  ms   Protime-INR   Result Value Ref Range    Protime 11.7 9.8 - 12.8 seconds    INR 1.0 0.9 - 1.1   APTT   Result Value Ref Range    aPTT 29 27 - 38 seconds   Type and Screen   Result Value Ref Range    ABO TYPE AB     Rh TYPE POS     ANTIBODY SCREEN POS    BB ORDER ONLY - Antibody Identification   Result Value Ref Range    Antibody ID Anti-K     CASE #     Basic metabolic panel   Result Value Ref Range    Glucose 128 (H) 74 - 99 mg/dL    Sodium 141 136 - 145 mmol/L    Potassium 3.4 (L) 3.5 - 5.3 mmol/L    Chloride 107 98 - 107 mmol/L    Bicarbonate 21 21 - 32 mmol/L    Anion Gap 16 10 - 20 mmol/L    Urea Nitrogen 56 (H) 6 - 23 mg/dL    Creatinine 2.73 (H) 0.50 - 1.05 mg/dL    eGFR 16 (L) >60 mL/min/1.73m*2    Calcium 8.3 (L) 8.6 - 10.3 mg/dL   CBC   Result Value Ref Range    WBC 7.8 4.4 - 11.3 x10*3/uL    nRBC 0.0 0.0 - 0.0 /100 WBCs    RBC 4.31 4.00 - 5.20 x10*6/uL    Hemoglobin 9.8 (L) 12.0 - 16.0 g/dL    Hematocrit 33.3 (L) 36.0 - 46.0 %    MCV 77 (L) 80 - 100 fL    MCH 22.7 (L) 26.0 - 34.0 pg    MCHC 29.4 (L) 32.0 - 36.0 g/dL    RDW 16.7 (H) 11.5 - 14.5 %    Platelets 233 150 - 450 x10*3/uL   CBC   Result Value Ref Range    WBC 6.3 4.4 - 11.3 x10*3/uL    nRBC 0.0 0.0 - 0.0 /100 WBCs    RBC 4.26 4.00 - 5.20 x10*6/uL    Hemoglobin 9.7 (L) 12.0 - 16.0 g/dL    Hematocrit 33.7 (L) 36.0 - 46.0 %    MCV 79 (L) 80 - 100 fL    MCH 22.8 (L) 26.0 - 34.0 pg    MCHC 28.8 (L) 32.0 - 36.0 g/dL    RDW 16.7 (H) 11.5 - 14.5 %    Platelets 229 150 - 450 x10*3/uL   Basic Metabolic Panel   Result Value Ref Range    Glucose 105 (H) 74 - 99 mg/dL    Sodium 140 136 - 145 mmol/L    Potassium 3.5 3.5 - 5.3 mmol/L    Chloride 107 98 - 107 mmol/L    Bicarbonate 18 (L) 21 - 32 mmol/L    Anion Gap 19 10 - 20 mmol/L    Urea Nitrogen 50 (H) 6 - 23 mg/dL    Creatinine 2.09 (H) 0.50 - 1.05 mg/dL    eGFR 22 (L) >60 mL/min/1.73m*2    Calcium 8.3 (L) 8.6 - 10.3 mg/dL   POCT GLUCOSE   Result Value Ref Range    POCT Glucose 124 (H) 74 - 99 mg/dL    Prepare RBC: 2 Units   Result Value Ref Range    PRODUCT CODE P5183H06     Unit Number [id number]     Unit ABO AB     Unit RH POS     XM INTEP COMP     Dispense Status XM     Blood Expiration Date July 24, 2024 23:59 EDT     PRODUCT BLOOD TYPE 8400     UNIT VOLUME 350     PRODUCT CODE S4658W62     Unit Number [id number]     Unit ABO A     Unit RH POS     XM INTEP COMP     Dispense Status XM     Blood Expiration Date August 05, 2024 23:59 EDT     PRODUCT BLOOD TYPE 6200     UNIT VOLUME 350    POCT GLUCOSE   Result Value Ref Range    POCT Glucose 91 74 - 99 mg/dL   POCT GLUCOSE   Result Value Ref Range    POCT Glucose 189 (H) 74 - 99 mg/dL   CBC   Result Value Ref Range    WBC 6.6 4.4 - 11.3 x10*3/uL    nRBC 0.0 0.0 - 0.0 /100 WBCs    RBC 3.64 (L) 4.00 - 5.20 x10*6/uL    Hemoglobin 8.3 (L) 12.0 - 16.0 g/dL    Hematocrit 27.9 (L) 36.0 - 46.0 %    MCV 77 (L) 80 - 100 fL    MCH 22.8 (L) 26.0 - 34.0 pg    MCHC 29.7 (L) 32.0 - 36.0 g/dL    RDW 16.5 (H) 11.5 - 14.5 %    Platelets 234 150 - 450 x10*3/uL   Basic Metabolic Panel   Result Value Ref Range    Glucose 172 (H) 74 - 99 mg/dL    Sodium 141 136 - 145 mmol/L    Potassium 3.8 3.5 - 5.3 mmol/L    Chloride 109 (H) 98 - 107 mmol/L    Bicarbonate 20 (L) 21 - 32 mmol/L    Anion Gap 16 10 - 20 mmol/L    Urea Nitrogen 38 (H) 6 - 23 mg/dL    Creatinine 1.83 (H) 0.50 - 1.05 mg/dL    eGFR 26 (L) >60 mL/min/1.73m*2    Calcium 7.7 (L) 8.6 - 10.3 mg/dL   POCT GLUCOSE   Result Value Ref Range    POCT Glucose 186 (H) 74 - 99 mg/dL   POCT GLUCOSE   Result Value Ref Range    POCT Glucose 181 (H) 74 - 99 mg/dL   POCT GLUCOSE   Result Value Ref Range    POCT Glucose 295 (H) 74 - 99 mg/dL   POCT GLUCOSE   Result Value Ref Range    POCT Glucose 216 (H) 74 - 99 mg/dL   POCT GLUCOSE   Result Value Ref Range    POCT Glucose 193 (H) 74 - 99 mg/dL   CBC   Result Value Ref Range    WBC 5.6 4.4 - 11.3 x10*3/uL    nRBC 0.0 0.0 - 0.0 /100 WBCs    RBC 3.36 (L) 4.00 - 5.20 x10*6/uL     Hemoglobin 7.7 (L) 12.0 - 16.0 g/dL    Hematocrit 24.8 (L) 36.0 - 46.0 %    MCV 74 (L) 80 - 100 fL    MCH 22.9 (L) 26.0 - 34.0 pg    MCHC 31.0 (L) 32.0 - 36.0 g/dL    RDW 16.5 (H) 11.5 - 14.5 %    Platelets 208 150 - 450 x10*3/uL   Basic Metabolic Panel   Result Value Ref Range    Glucose 204 (H) 74 - 99 mg/dL    Sodium 136 136 - 145 mmol/L    Potassium 3.5 3.5 - 5.3 mmol/L    Chloride 107 98 - 107 mmol/L    Bicarbonate 20 (L) 21 - 32 mmol/L    Anion Gap 13 10 - 20 mmol/L    Urea Nitrogen 29 (H) 6 - 23 mg/dL    Creatinine 1.80 (H) 0.50 - 1.05 mg/dL    eGFR 27 (L) >60 mL/min/1.73m*2    Calcium 8.2 (L) 8.6 - 10.3 mg/dL   POCT GLUCOSE   Result Value Ref Range    POCT Glucose 188 (H) 74 - 99 mg/dL   POCT GLUCOSE   Result Value Ref Range    POCT Glucose 224 (H) 74 - 99 mg/dL                Assessment/Plan        Principal Problem:    Closed right hip fracture, initial encounter (Multi)  Active Problems:    Benign essential HTN    3-vessel coronary artery disease    Diabetes mellitus type 2, noninsulin dependent (Multi)    Red blood cell antibody positive    History of endovascular stent graft for abdominal aortic aneurysm (AAA)    Pacemaker    Cont with dvt ppx   Avoid asa  Ot pt   Pain control   Resumed homemeds  Dc plan [facility] snf Monday               Jacky Hendrix MD

## 2024-07-08 NOTE — PROGRESS NOTES
Physical Therapy    Physical Therapy Treatment    Patient Name: Maci Catalan  MRN: [id number]  Today's Date: 7/8/2024  Time Calculation  Start Time: 0939  Stop Time: 1007  Time Calculation (min): 28 min    Assessment/Plan   PT Assessment  End of Session Communication: Bedside nurse  Assessment Comment: pt able to increase gait distance  End of Session Patient Position: Alarm on, Bed, 3 rail up  PT Plan  Inpatient/Swing Bed or Outpatient: Inpatient  PT Plan  Treatment/Interventions: Bed mobility, Transfer training, Gait training  PT Plan: Ongoing PT  PT Frequency: 5 times per week  PT Discharge Recommendations: Moderate intensity level of continued care  Equipment Recommended upon Discharge: Wheeled walker  PT Recommended Transfer Status: Assist x1  PT - OK to Discharge: Yes (per POC)      General Visit Information:   PT  Visit  PT Received On: 07/08/24  General  Reason for Referral: R hip fx- s/p ORIF w/ nailing by Dr. Barton 7/5  Referred By: OT 7/5 Mick  Past Medical History Relevant to Rehab: PMH: NSTEMI, pacemaker, AAA, pancreatic mass s/p whipple  Family/Caregiver Present: Yes  Prior to Session Communication: Bedside nurse  Patient Position Received: Up in chair, Alarm on  General Comment: agreeable to tx    Subjective   Precautions:  Precautions  LE Weight Bearing Status: Weight Bearing as Tolerated  Medical Precautions: Fall precautions, Oxygen therapy device and L/min  Vital Signs:       Objective   Pain:  Pain Assessment  0-10 (Numeric) Pain Score: 4  Pain Location: Leg  Pain Orientation: Right  Pain Interventions: Medication (See MAR)  Cognition:  Cognition  Overall Cognitive Status: Within Functional Limits  Coordination:     Postural Control:  Static Standing Balance  Static Standing-Comment/Number of Minutes: pt performed static standing balance with UE support at RW and  CGA/SBA,  x2 min  Extremity/Trunk Assessments:    Activity Tolerance:     Treatments:  Therapeutic Exercise  Therapeutic  Exercise Performed: Yes  Therapeutic Exercise Activity 1: pt performed supine ther ex x15 : AP, QS, GS, SAQ, Hip flexion, min vc/tc for form         Bed Mobility  Bed Mobility: Yes  Bed Mobility 1  Bed Mobility 1: Sitting to supine  Level of Assistance 1: Close supervision  Bed Mobility Comments 1: pt able to perform slowly    Ambulation/Gait Training  Ambulation/Gait Training Performed: Yes  Ambulation/Gait Training 1  Surface 1: Level tile  Device 1: Rolling walker  Gait Support Devices: Gait belt  Assistance 1: Contact guard  Quality of Gait 1: Decreased step length, Forward flexed posture  Comments/Distance (ft) 1: 35x2 (pt performed slowly, x2 standing rest breaks, step through gait pattern. max vc for forward gaze.  no overt LOB)  Transfer 1  Technique 1: Sit to stand, Stand to sit  Transfer Device 1: Walker  Transfer Level of Assistance 1: Contact guard  Trials/Comments 1: vc/tc for hand placment on solid sitting surface and not RW. pt performed x2    Outcome Measures:  [facility] Basic Mobility  Turning from your back to your side while in a flat bed without using bedrails: A little  Moving from lying on your back to sitting on the side of a flat bed without using bedrails: A little  Moving to and from bed to chair (including a wheelchair): A little  Standing up from a chair using your arms (e.g. wheelchair or bedside chair): A little  To walk in hospital room: A little  Climbing 3-5 steps with railing: Total  Basic Mobility - Total Score: 16    Education Documentation  Mobility Training, taught by Obdulio Lewis PTA at 7/8/2024 11:43 AM.  Learner: Patient  Readiness: Acceptance  Method: Explanation  Response: Verbalizes Understanding    Education Comments  No comments found.        OP EDUCATION:  Outpatient Education  Education Comment: educated pt on importance of OOB activity    Encounter Problems       Encounter Problems (Active)       Mobility       STG - Patient will ambulate 150 ft mod I with rolling  walker (Progressing)       Start:  07/06/24    Expected End:  07/20/24               PT Transfers       STG - Patient will perform bed mobility mod I (Progressing)       Start:  07/06/24    Expected End:  07/20/24            STG - Patient will transfer sit to and from stand mod I (Progressing)       Start:  07/06/24    Expected End:  07/20/24               Pain - Adult

## 2024-07-08 NOTE — PROGRESS NOTES
Occupational Therapy    Occupational Therapy Treatment    Name: Maci Catalan  MRN: [id number]  : 1935  Date: 24  Time Calculation  Start Time: 902  Stop Time: 936  Time Calculation (min): 34 min    Assessment:  OT Assessment: Pt making good progress towards goals  Prognosis: Good  Barriers to Discharge: None  Evaluation/Treatment Tolerance: Patient tolerated treatment well  Medical Staff Made Aware: Yes  End of Session Communication: Bedside nurse  End of Session Patient Position: Up in chair, Alarm on  Plan:  Treatment Interventions: ADL retraining, Functional transfer training, Patient/family training, Equipment evaluation/education  OT Frequency: 3 times per week  OT Discharge Recommendations: Moderate intensity level of continued care  Equipment Recommended upon Discharge: Wheeled walker  OT Recommended Transfer Status:  (CGA)  OT - OK to Discharge: Yes    Subjective   Previous Visit Info:  OT Last Visit  OT Received On: 24  General:  General  Reason for Referral: R hip fx- s/p ORIF w/ nailing by Dr. Barton   Family/Caregiver Present: Yes  Caregiver Feedback: dtr  Prior to Session Communication: Bedside nurse  Patient Position Received: Up in chair, Alarm on  General Comment: Cleared by nursing pt agreeable to OT tx  Precautions:  LE Weight Bearing Status: Weight Bearing as Tolerated  Medical Precautions: Fall precautions, Oxygen therapy device and L/min  Vitals:     Pain Assessment:  Pain Assessment  Pain Assessment: 0-10  0-10 (Numeric) Pain Score: 0 - No pain     Objective   Cognition:  Overall Cognitive Status: Within Functional Limits  Activities of Daily Living: LE Dressing  LE Dressing: Yes  LE Dressing Adaptive Equipment: Reacher, Sock aide, Shoe horn, Dressing stick  Pants Level of Assistance: Contact guard, Setup, Minimal verbal cues, Tactile cues, Visual aid cues  Sock Level of Assistance: Contact guard, Setup, Minimal verbal cues, Tactile cues, Visual aid cues     Bed  Mobility/Transfers: Transfers  Transfer: Yes  Transfer 1  Technique 1: Sit to stand, Stand to sit  Transfer Device 1: Walker  Transfer Level of Assistance 1: Contact guard  Trials/Comments 1: VCS for pushing from chair to standing      Functional Mobility:  Functional Mobility  Functional Mobility Performed: Yes  Functional Mobility 1  Surface 1: Level tile  Device 1: Rolling walker  Assistance 1: Contact guard  Comments 1: Pt ambulated CGA with rolling walker in room 12 feet  Sitting Balance:  Static Sitting Balance  Static Sitting-Balance Support: Feet supported  Static Sitting-Level of Assistance: Independent  Dynamic Sitting Balance  Dynamic Sitting-Balance Support: Feet supported  Dynamic Sitting-Balance: Reaching for objects, Reaching across midline  Dynamic Sitting-Comments: independent  Standing Balance:  Static Standing Balance  Static Standing-Balance Support: No upper extremity supported  Static Standing-Level of Assistance: Contact guard  Static Standing-Comment/Number of Minutes:  (Pt able to stand to pull up pants, walkr in safe distance for pt to utilize for balance as needed)     Therapy/Activity: Therapeutic Activity  Therapeutic Activity Performed: Yes  Therapeutic Activity 1: reviewed adaptive equipment with pt what would be beneficial and options of where equipment is available  Therapeutic Activity 2: reviewed hip precautions, answered questions, Pt and dtr stated understanding  Outcome Measures:  [facility] Daily Activity  Putting on and taking off regular lower body clothing: A lot  Bathing (including washing, rinsing, drying): A lot  Putting on and taking off regular upper body clothing: None  Toileting, which includes using toilet, bedpan or urinal: A little  Taking care of personal grooming such as brushing teeth: None  Eating Meals: None  Daily Activity - Total Score: 19        Education Documentation  Body Mechanics, taught by Jerica Blair OT at 7/8/2024  9:53 AM.  Learner: Family,  Patient  Readiness: Acceptance  Method: Explanation, Demonstration, Teach-back  Response: Verbalizes Understanding, Needs Reinforcement    Precautions, taught by Jerica Blair OT at 7/8/2024  9:53 AM.  Learner: Family, Patient  Readiness: Acceptance  Method: Explanation, Demonstration, Teach-back  Response: Verbalizes Understanding, Needs Reinforcement    ADL Training, taught by Jerica Blair, OT at 7/8/2024  9:53 AM.  Learner: Family, Patient  Readiness: Acceptance  Method: Explanation, Demonstration, Teach-back  Response: Verbalizes Understanding, Needs Reinforcement    Education Comments  No comments found.      Goals:  Encounter Problems       Encounter Problems (Active)       ADLs       Patient with complete upper body dressing with modified independent level of assistance donning and doffing all UE clothes with PRN adaptive equipment (Progressing)       Start:  07/06/24    Expected End:  07/20/24            Patient with complete lower body dressing with modified independent level of assistance donning and doffing all LE clothing with PRN adaptive equipment (Progressing)       Start:  07/06/24    Expected End:  07/20/24            Patient will complete daily grooming tasks with modified independent level of assistance and PRN adaptive equipment. (Progressing)       Start:  07/06/24    Expected End:  07/20/24            Patient will complete toileting including hygiene clothing management/hygiene with modified independent level of assistance and PRN adaptive equipment. (Progressing)       Start:  07/06/24    Expected End:  07/20/24               MOBILITY       Patient will perform Functional mobility Household distances/Community Distances with modified independent level of assistance and front wheeled walker in order to improve safety and functional mobility. (Progressing)       Start:  07/06/24    Expected End:  07/20/24

## 2024-07-09 LAB
ANION GAP SERPL CALC-SCNC: 13 MMOL/L (ref 10–20)
BUN SERPL-MCNC: 25 MG/DL (ref 6–23)
CALCIUM SERPL-MCNC: 7.9 MG/DL (ref 8.6–10.3)
CHLORIDE SERPL-SCNC: 107 MMOL/L (ref 98–107)
CO2 SERPL-SCNC: 21 MMOL/L (ref 21–32)
CREAT SERPL-MCNC: 1.56 MG/DL (ref 0.5–1.05)
EGFRCR SERPLBLD CKD-EPI 2021: 32 ML/MIN/1.73M*2
ERYTHROCYTE [DISTWIDTH] IN BLOOD BY AUTOMATED COUNT: 16.4 % (ref 11.5–14.5)
GLUCOSE BLD MANUAL STRIP-MCNC: 132 MG/DL (ref 74–99)
GLUCOSE BLD MANUAL STRIP-MCNC: 158 MG/DL (ref 74–99)
GLUCOSE BLD MANUAL STRIP-MCNC: 237 MG/DL (ref 74–99)
GLUCOSE BLD MANUAL STRIP-MCNC: 282 MG/DL (ref 74–99)
GLUCOSE SERPL-MCNC: 157 MG/DL (ref 74–99)
HCT VFR BLD AUTO: 26.8 % (ref 36–46)
HGB BLD-MCNC: 7.9 G/DL (ref 12–16)
MCH RBC QN AUTO: 22.4 PG (ref 26–34)
MCHC RBC AUTO-ENTMCNC: 29.5 G/DL (ref 32–36)
MCV RBC AUTO: 76 FL (ref 80–100)
NRBC BLD-RTO: 0.5 /100 WBCS (ref 0–0)
PLATELET # BLD AUTO: 240 X10*3/UL (ref 150–450)
POTASSIUM SERPL-SCNC: 3.9 MMOL/L (ref 3.5–5.3)
RBC # BLD AUTO: 3.52 X10*6/UL (ref 4–5.2)
SODIUM SERPL-SCNC: 137 MMOL/L (ref 136–145)
WBC # BLD AUTO: 5.6 X10*3/UL (ref 4.4–11.3)

## 2024-07-09 PROCEDURE — 97110 THERAPEUTIC EXERCISES: CPT | Mod: GP,CQ

## 2024-07-09 PROCEDURE — 2060000001 HC INTERMEDIATE ICU ROOM DAILY

## 2024-07-09 PROCEDURE — 82374 ASSAY BLOOD CARBON DIOXIDE: CPT | Performed by: NURSE PRACTITIONER

## 2024-07-09 PROCEDURE — 2500000004 HC RX 250 GENERAL PHARMACY W/ HCPCS (ALT 636 FOR OP/ED): Performed by: PHYSICAL THERAPIST

## 2024-07-09 PROCEDURE — 97116 GAIT TRAINING THERAPY: CPT | Mod: GP,CQ

## 2024-07-09 PROCEDURE — 2500000002 HC RX 250 W HCPCS SELF ADMINISTERED DRUGS (ALT 637 FOR MEDICARE OP, ALT 636 FOR OP/ED): Performed by: NURSE PRACTITIONER

## 2024-07-09 PROCEDURE — 36415 COLL VENOUS BLD VENIPUNCTURE: CPT | Performed by: NURSE PRACTITIONER

## 2024-07-09 PROCEDURE — 2500000001 HC RX 250 WO HCPCS SELF ADMINISTERED DRUGS (ALT 637 FOR MEDICARE OP): Performed by: PHYSICAL THERAPIST

## 2024-07-09 PROCEDURE — 2500000001 HC RX 250 WO HCPCS SELF ADMINISTERED DRUGS (ALT 637 FOR MEDICARE OP)

## 2024-07-09 PROCEDURE — 2500000004 HC RX 250 GENERAL PHARMACY W/ HCPCS (ALT 636 FOR OP/ED): Performed by: FAMILY MEDICINE

## 2024-07-09 PROCEDURE — 82947 ASSAY GLUCOSE BLOOD QUANT: CPT

## 2024-07-09 PROCEDURE — 85027 COMPLETE CBC AUTOMATED: CPT | Performed by: NURSE PRACTITIONER

## 2024-07-09 RX ORDER — DEXTROSE 50 % IN WATER (D50W) INTRAVENOUS SYRINGE
12.5
Status: DISCONTINUED | OUTPATIENT
Start: 2024-07-09 | End: 2024-07-11 | Stop reason: HOSPADM

## 2024-07-09 RX ORDER — INSULIN LISPRO 100 [IU]/ML
0-10 INJECTION, SOLUTION INTRAVENOUS; SUBCUTANEOUS
Start: 2024-07-09 | End: 2024-07-11 | Stop reason: HOSPADM

## 2024-07-09 RX ORDER — ACETAMINOPHEN 325 MG/1
650 TABLET ORAL EVERY 4 HOURS PRN
Start: 2024-07-09

## 2024-07-09 RX ORDER — INSULIN LISPRO 100 [IU]/ML
0-10 INJECTION, SOLUTION INTRAVENOUS; SUBCUTANEOUS
Status: DISCONTINUED | OUTPATIENT
Start: 2024-07-09 | End: 2024-07-11 | Stop reason: HOSPADM

## 2024-07-09 RX ORDER — METHOCARBAMOL 500 MG/1
500 TABLET, FILM COATED ORAL 4 TIMES DAILY PRN
Start: 2024-07-09

## 2024-07-09 RX ORDER — DEXTROSE 50 % IN WATER (D50W) INTRAVENOUS SYRINGE
25
Status: DISCONTINUED | OUTPATIENT
Start: 2024-07-09 | End: 2024-07-11 | Stop reason: HOSPADM

## 2024-07-09 RX ORDER — ASPIRIN 81 MG/1
81 TABLET ORAL 2 TIMES DAILY
Start: 2024-07-09 | End: 2024-07-11 | Stop reason: HOSPADM

## 2024-07-09 ASSESSMENT — COGNITIVE AND FUNCTIONAL STATUS - GENERAL
CLIMB 3 TO 5 STEPS WITH RAILING: A LOT
MOVING FROM LYING ON BACK TO SITTING ON SIDE OF FLAT BED WITH BEDRAILS: A LITTLE
HELP NEEDED FOR BATHING: A LITTLE
HELP NEEDED FOR BATHING: A LOT
MOVING TO AND FROM BED TO CHAIR: A LITTLE
DAILY ACTIVITIY SCORE: 19
WALKING IN HOSPITAL ROOM: A LITTLE
STANDING UP FROM CHAIR USING ARMS: A LITTLE
DAILY ACTIVITIY SCORE: 19
TURNING FROM BACK TO SIDE WHILE IN FLAT BAD: A LITTLE
WALKING IN HOSPITAL ROOM: A LITTLE
WALKING IN HOSPITAL ROOM: A LITTLE
CLIMB 3 TO 5 STEPS WITH RAILING: A LOT
WALKING IN HOSPITAL ROOM: A LITTLE
STANDING UP FROM CHAIR USING ARMS: A LITTLE
MOVING TO AND FROM BED TO CHAIR: A LITTLE
TURNING FROM BACK TO SIDE WHILE IN FLAT BAD: A LITTLE
DRESSING REGULAR LOWER BODY CLOTHING: A LOT
MOVING TO AND FROM BED TO CHAIR: A LITTLE
MOVING TO AND FROM BED TO CHAIR: A LITTLE
HELP NEEDED FOR BATHING: A LOT
MOBILITY SCORE: 16
DAILY ACTIVITIY SCORE: 19
HELP NEEDED FOR BATHING: A LOT
MOVING TO AND FROM BED TO CHAIR: A LITTLE
DAILY ACTIVITIY SCORE: 19
DAILY ACTIVITIY SCORE: 19
WALKING IN HOSPITAL ROOM: A LITTLE
CLIMB 3 TO 5 STEPS WITH RAILING: A LOT
HELP NEEDED FOR BATHING: A LOT
MOVING FROM LYING ON BACK TO SITTING ON SIDE OF FLAT BED WITH BEDRAILS: A LITTLE
MOVING TO AND FROM BED TO CHAIR: A LITTLE
DAILY ACTIVITIY SCORE: 21
HELP NEEDED FOR BATHING: A LOT
MOVING FROM LYING ON BACK TO SITTING ON SIDE OF FLAT BED WITH BEDRAILS: A LITTLE
MOVING FROM LYING ON BACK TO SITTING ON SIDE OF FLAT BED WITH BEDRAILS: A LITTLE
DRESSING REGULAR LOWER BODY CLOTHING: A LOT
DRESSING REGULAR LOWER BODY CLOTHING: A LOT
MOBILITY SCORE: 17
MOVING TO AND FROM BED TO CHAIR: A LITTLE
STANDING UP FROM CHAIR USING ARMS: A LITTLE
TOILETING: A LITTLE
DRESSING REGULAR LOWER BODY CLOTHING: A LITTLE
DRESSING REGULAR LOWER BODY CLOTHING: A LOT
HELP NEEDED FOR BATHING: A LOT
TURNING FROM BACK TO SIDE WHILE IN FLAT BAD: A LITTLE
WALKING IN HOSPITAL ROOM: A LITTLE
CLIMB 3 TO 5 STEPS WITH RAILING: A LOT
TURNING FROM BACK TO SIDE WHILE IN FLAT BAD: A LITTLE
MOBILITY SCORE: 17
STANDING UP FROM CHAIR USING ARMS: A LITTLE
MOBILITY SCORE: 17
CLIMB 3 TO 5 STEPS WITH RAILING: A LOT
MOBILITY SCORE: 17
STANDING UP FROM CHAIR USING ARMS: A LITTLE
TOILETING: A LITTLE
STANDING UP FROM CHAIR USING ARMS: A LITTLE
TOILETING: A LITTLE
TOILETING: A LITTLE
TURNING FROM BACK TO SIDE WHILE IN FLAT BAD: A LITTLE
MOVING FROM LYING ON BACK TO SITTING ON SIDE OF FLAT BED WITH BEDRAILS: A LITTLE
STANDING UP FROM CHAIR USING ARMS: A LITTLE
TURNING FROM BACK TO SIDE WHILE IN FLAT BAD: A LITTLE
MOVING FROM LYING ON BACK TO SITTING ON SIDE OF FLAT BED WITH BEDRAILS: A LITTLE
MOBILITY SCORE: 17
DRESSING REGULAR LOWER BODY CLOTHING: A LOT
MOVING FROM LYING ON BACK TO SITTING ON SIDE OF FLAT BED WITH BEDRAILS: A LITTLE
TOILETING: A LITTLE
DRESSING REGULAR LOWER BODY CLOTHING: A LOT
TURNING FROM BACK TO SIDE WHILE IN FLAT BAD: A LITTLE
TOILETING: A LITTLE
MOBILITY SCORE: 17
MOBILITY SCORE: 17
MOVING TO AND FROM BED TO CHAIR: A LITTLE
MOVING FROM LYING ON BACK TO SITTING ON SIDE OF FLAT BED WITH BEDRAILS: A LITTLE
STANDING UP FROM CHAIR USING ARMS: A LITTLE
DAILY ACTIVITIY SCORE: 19
WALKING IN HOSPITAL ROOM: A LITTLE
CLIMB 3 TO 5 STEPS WITH RAILING: A LOT
TURNING FROM BACK TO SIDE WHILE IN FLAT BAD: A LITTLE
TOILETING: A LITTLE
WALKING IN HOSPITAL ROOM: A LITTLE
CLIMB 3 TO 5 STEPS WITH RAILING: TOTAL
CLIMB 3 TO 5 STEPS WITH RAILING: A LOT

## 2024-07-09 ASSESSMENT — PAIN SCALES - GENERAL
PAINLEVEL_OUTOF10: 0 - NO PAIN
PAINLEVEL_OUTOF10: 2
PAINLEVEL_OUTOF10: 4
PAINLEVEL_OUTOF10: 1

## 2024-07-09 ASSESSMENT — PAIN - FUNCTIONAL ASSESSMENT
PAIN_FUNCTIONAL_ASSESSMENT: 0-10

## 2024-07-09 ASSESSMENT — PAIN SCALES - WONG BAKER: WONGBAKER_NUMERICALRESPONSE: NO HURT

## 2024-07-09 NOTE — CARE PLAN
The patient's goals for the shift include      The clinical goals for the shift include Patient will remain comfortable throughout the shift.  Problem: Pain - Adult  Goal: Verbalizes/displays adequate comfort level or baseline comfort level  Outcome: Progressing     Problem: Safety - Adult  Goal: Free from fall injury  Outcome: Progressing     Problem: Discharge Planning  Goal: Discharge to home or other facility with appropriate resources  Outcome: Progressing     Problem: Chronic Conditions and Co-morbidities  Goal: Patient's chronic conditions and co-morbidity symptoms are monitored and maintained or improved  Outcome: Progressing     Problem: Skin  Goal: Decreased wound size/increased tissue granulation at next dressing change  Outcome: Progressing     Problem: Skin  Goal: Decreased wound size/increased tissue granulation at next dressing change  Outcome: Progressing  Goal: Participates in plan/prevention/treatment measures  Outcome: Progressing  Goal: Prevent/manage excess moisture  Outcome: Progressing  Goal: Prevent/minimize sheer/friction injuries  Outcome: Progressing  Goal: Promote/optimize nutrition  Outcome: Progressing  Goal: Promote skin healing  Outcome: Progressing

## 2024-07-09 NOTE — PROGRESS NOTES
Physical Therapy    Physical Therapy Treatment    Patient Name: Maci Catalan  MRN: [id number]  Today's Date: 7/9/2024  Time Calculation  Start Time: 0958  Stop Time: 1024  Time Calculation (min): 26 min    Assessment/Plan   PT Assessment  End of Session Communication: Bedside nurse  Assessment Comment: pt walked in urias this tx  End of Session Patient Position: Alarm on, Bed, 3 rail up  PT Plan  Inpatient/Swing Bed or Outpatient: Inpatient  PT Plan  Treatment/Interventions: Bed mobility, Transfer training, Gait training  PT Plan: Ongoing PT  PT Frequency: 5 times per week  PT Discharge Recommendations: Moderate intensity level of continued care  Equipment Recommended upon Discharge: Wheeled walker  PT Recommended Transfer Status: Assist x1  PT - OK to Discharge: Yes (per POC)      General Visit Information:   PT  Visit  PT Received On: 07/09/24  General  Reason for Referral: R hip fx- s/p ORIF w/ nailing by Dr. Barton 7/5  Referred By: OT 7/5 Mick  Past Medical History Relevant to Rehab: PMH: NSTEMI, pacemaker, AAA, pancreatic mass s/p whipple  Prior to Session Communication: Bedside nurse  Patient Position Received: Up in chair, Alarm off, not on at start of session  General Comment: pt agreeable to tx    Subjective   Precautions:  Precautions  LE Weight Bearing Status: Weight Bearing as Tolerated  Medical Precautions: Fall precautions  Vital Signs:  Vital Signs  Heart Rate: 98  SpO2: 95 %    Objective   Pain:  Pain Assessment  0-10 (Numeric) Pain Score: 0 - No pain  Cognition:  Cognition  Overall Cognitive Status: Within Functional Limits  Coordination:     Postural Control:     Extremity/Trunk Assessments:    Activity Tolerance:     Treatments:  Therapeutic Exercise  Therapeutic Exercise Performed: Yes  Therapeutic Exercise Activity 1: pt performed supine ther ex x20 : AP, QS, GS, SAQ, Hip flexion              Ambulation/Gait Training  Ambulation/Gait Training Performed: Yes  Ambulation/Gait Training  1  Surface 1: Level tile  Device 1: Rolling walker  Gait Support Devices: Gait belt  Assistance 1: Contact guard  Comments/Distance (ft) 1: 30x2, 25x1 (pt performed with step through gait pattern. max vc for forward gaze.  no overt LOB x2 short standing rest breaks.)  Transfer 1  Technique 1: Sit to stand, Stand to sit  Transfer Device 1: Walker  Transfer Level of Assistance 1: Contact guard  Trials/Comments 1: vc/tc for hand placment on solid sitting surface and not RW. pt performed x2 mod vc for eccentric control    Outcome Measures:  [facility] Basic Mobility  Turning from your back to your side while in a flat bed without using bedrails: A little  Moving from lying on your back to sitting on the side of a flat bed without using bedrails: A little  Moving to and from bed to chair (including a wheelchair): A little  Standing up from a chair using your arms (e.g. wheelchair or bedside chair): A little  To walk in hospital room: A little  Climbing 3-5 steps with railing: Total  Basic Mobility - Total Score: 16    Education Documentation  Mobility Training, taught by Obdulio Lewis PTA at 7/9/2024  4:06 PM.  Learner: Patient  Readiness: Acceptance  Method: Explanation  Response: Verbalizes Understanding    Education Comments  No comments found.        OP EDUCATION:       Encounter Problems       Encounter Problems (Active)       Mobility       STG - Patient will ambulate 150 ft mod I with rolling walker (Progressing)       Start:  07/06/24    Expected End:  07/20/24               PT Transfers       STG - Patient will perform bed mobility mod I (Progressing)       Start:  07/06/24    Expected End:  07/20/24            STG - Patient will transfer sit to and from stand mod I (Progressing)       Start:  07/06/24    Expected End:  07/20/24               Pain - Adult

## 2024-07-09 NOTE — PROGRESS NOTES
Maci Catalan is a 89 y.o. female on day 5 of admission presenting with Closed right hip fracture, initial encounter (Multi).      Subjective   No pain   She is able to walk with walker  At baseline does not use cane or walker  Pain controlled  Dvt ppx is in place  No bleeding        Objective     Last Recorded Vitals  /54   Pulse 75   Temp 36.6 °C (97.8 °F) (Temporal)   Resp 16   Wt 45.8 kg (100 lb 15.5 oz)   SpO2 93%   Intake/Output last 3 Shifts:        Admission Weight  Weight: 45.8 kg (100 lb 15.5 oz) (07/04/24 1627)    Daily Weight  07/04/24 : 45.8 kg (100 lb 15.5 oz)    Image Results  FL less than 1 hour  These images are not reportable by radiology and will not be interpreted   by  Radiologists.  ECG 12 Lead  Atrial-sensed ventricular-paced rhythm  Abnormal ECG  When compared with ECG of 09-APR-2024 11:17,  Vent. rate has increased BY   3 BPM      Physical Exam  Well developed well nurished  No distress  Ao  Face symmetrical   Neck no jvd no bruit  Chest clear  CVS regular  Ext no edema  Abdo soft nontender bs active, no masses  Cns alert appropriate able to move ext   Skin intact  Psych normal affect    Relevant Results  Scheduled medications  fluticasone, 1 spray, Each Nostril, Daily  [Held by provider] furosemide, 40 mg, oral, Daily  heparin (porcine), 5,000 Units, subcutaneous, q8h LISA  [Held by provider] lisinopril, 2.5 mg, oral, Daily  melatonin, 9 mg, oral, Nightly  methocarbamol, 500 mg, oral, 4x daily  metoprolol tartrate, 25 mg, oral, BID  pantoprazole, 40 mg, oral, Daily before breakfast  sucralfate, 1 g, oral, 4x daily      Continuous medications  D5 % and 0.9 % sodium chloride, 50 mL/hr, Last Rate: 50 mL/hr (07/06/24 1539)      PRN medications  PRN medications: acetaminophen **OR** acetaminophen **OR** acetaminophen, acetaminophen, morphine, nitroglycerin, ondansetron **OR** ondansetron, oxyCODONE, oxyCODONE  Results for orders placed or performed during the hospital encounter  of 07/04/24 (from the past 96 hour(s))   POCT GLUCOSE   Result Value Ref Range    POCT Glucose 124 (H) 74 - 99 mg/dL   Prepare RBC: 2 Units   Result Value Ref Range    PRODUCT CODE D5037D27     Unit Number [id number]     Unit ABO AB     Unit RH POS     XM INTEP COMP     Dispense Status RE     Blood Expiration Date July 24, 2024 23:59 EDT     PRODUCT BLOOD TYPE 8400     UNIT VOLUME 350     PRODUCT CODE G0845R05     Unit Number [id number]     Unit ABO A     Unit RH POS     XM INTEP COMP     Dispense Status RE     Blood Expiration Date August 05, 2024 23:59 EDT     PRODUCT BLOOD TYPE 6200     UNIT VOLUME 350    POCT GLUCOSE   Result Value Ref Range    POCT Glucose 91 74 - 99 mg/dL   POCT GLUCOSE   Result Value Ref Range    POCT Glucose 189 (H) 74 - 99 mg/dL   CBC   Result Value Ref Range    WBC 6.6 4.4 - 11.3 x10*3/uL    nRBC 0.0 0.0 - 0.0 /100 WBCs    RBC 3.64 (L) 4.00 - 5.20 x10*6/uL    Hemoglobin 8.3 (L) 12.0 - 16.0 g/dL    Hematocrit 27.9 (L) 36.0 - 46.0 %    MCV 77 (L) 80 - 100 fL    MCH 22.8 (L) 26.0 - 34.0 pg    MCHC 29.7 (L) 32.0 - 36.0 g/dL    RDW 16.5 (H) 11.5 - 14.5 %    Platelets 234 150 - 450 x10*3/uL   Basic Metabolic Panel   Result Value Ref Range    Glucose 172 (H) 74 - 99 mg/dL    Sodium 141 136 - 145 mmol/L    Potassium 3.8 3.5 - 5.3 mmol/L    Chloride 109 (H) 98 - 107 mmol/L    Bicarbonate 20 (L) 21 - 32 mmol/L    Anion Gap 16 10 - 20 mmol/L    Urea Nitrogen 38 (H) 6 - 23 mg/dL    Creatinine 1.83 (H) 0.50 - 1.05 mg/dL    eGFR 26 (L) >60 mL/min/1.73m*2    Calcium 7.7 (L) 8.6 - 10.3 mg/dL   POCT GLUCOSE   Result Value Ref Range    POCT Glucose 186 (H) 74 - 99 mg/dL   POCT GLUCOSE   Result Value Ref Range    POCT Glucose 181 (H) 74 - 99 mg/dL   POCT GLUCOSE   Result Value Ref Range    POCT Glucose 295 (H) 74 - 99 mg/dL   POCT GLUCOSE   Result Value Ref Range    POCT Glucose 216 (H) 74 - 99 mg/dL   POCT GLUCOSE   Result Value Ref Range    POCT Glucose 193 (H) 74 - 99 mg/dL   CBC   Result Value  Ref Range    WBC 5.6 4.4 - 11.3 x10*3/uL    nRBC 0.0 0.0 - 0.0 /100 WBCs    RBC 3.36 (L) 4.00 - 5.20 x10*6/uL    Hemoglobin 7.7 (L) 12.0 - 16.0 g/dL    Hematocrit 24.8 (L) 36.0 - 46.0 %    MCV 74 (L) 80 - 100 fL    MCH 22.9 (L) 26.0 - 34.0 pg    MCHC 31.0 (L) 32.0 - 36.0 g/dL    RDW 16.5 (H) 11.5 - 14.5 %    Platelets 208 150 - 450 x10*3/uL   Basic Metabolic Panel   Result Value Ref Range    Glucose 204 (H) 74 - 99 mg/dL    Sodium 136 136 - 145 mmol/L    Potassium 3.5 3.5 - 5.3 mmol/L    Chloride 107 98 - 107 mmol/L    Bicarbonate 20 (L) 21 - 32 mmol/L    Anion Gap 13 10 - 20 mmol/L    Urea Nitrogen 29 (H) 6 - 23 mg/dL    Creatinine 1.80 (H) 0.50 - 1.05 mg/dL    eGFR 27 (L) >60 mL/min/1.73m*2    Calcium 8.2 (L) 8.6 - 10.3 mg/dL   POCT GLUCOSE   Result Value Ref Range    POCT Glucose 188 (H) 74 - 99 mg/dL   POCT GLUCOSE   Result Value Ref Range    POCT Glucose 224 (H) 74 - 99 mg/dL   CBC   Result Value Ref Range    WBC 5.1 4.4 - 11.3 x10*3/uL    nRBC 0.4 (H) 0.0 - 0.0 /100 WBCs    RBC 3.29 (L) 4.00 - 5.20 x10*6/uL    Hemoglobin 7.3 (L) 12.0 - 16.0 g/dL    Hematocrit 24.6 (L) 36.0 - 46.0 %    MCV 75 (L) 80 - 100 fL    MCH 22.2 (L) 26.0 - 34.0 pg    MCHC 29.7 (L) 32.0 - 36.0 g/dL    RDW 16.3 (H) 11.5 - 14.5 %    Platelets 216 150 - 450 x10*3/uL   Basic Metabolic Panel   Result Value Ref Range    Glucose 138 (H) 74 - 99 mg/dL    Sodium 134 (L) 136 - 145 mmol/L    Potassium 3.5 3.5 - 5.3 mmol/L    Chloride 106 98 - 107 mmol/L    Bicarbonate 23 21 - 32 mmol/L    Anion Gap 9 (L) 10 - 20 mmol/L    Urea Nitrogen 31 (H) 6 - 23 mg/dL    Creatinine 1.85 (H) 0.50 - 1.05 mg/dL    eGFR 26 (L) >60 mL/min/1.73m*2    Calcium 7.7 (L) 8.6 - 10.3 mg/dL   POCT GLUCOSE   Result Value Ref Range    POCT Glucose 253 (H) 74 - 99 mg/dL   POCT GLUCOSE   Result Value Ref Range    POCT Glucose 182 (H) 74 - 99 mg/dL   POCT GLUCOSE   Result Value Ref Range    POCT Glucose 252 (H) 74 - 99 mg/dL   Hemoglobin and hematocrit, blood   Result Value Ref  Range    Hemoglobin 7.7 (L) 12.0 - 16.0 g/dL    Hematocrit 25.8 (L) 36.0 - 46.0 %   Basic Metabolic Panel   Result Value Ref Range    Glucose 157 (H) 74 - 99 mg/dL    Sodium 137 136 - 145 mmol/L    Potassium 3.9 3.5 - 5.3 mmol/L    Chloride 107 98 - 107 mmol/L    Bicarbonate 21 21 - 32 mmol/L    Anion Gap 13 10 - 20 mmol/L    Urea Nitrogen 25 (H) 6 - 23 mg/dL    Creatinine 1.56 (H) 0.50 - 1.05 mg/dL    eGFR 32 (L) >60 mL/min/1.73m*2    Calcium 7.9 (L) 8.6 - 10.3 mg/dL   CBC   Result Value Ref Range    WBC 5.6 4.4 - 11.3 x10*3/uL    nRBC 0.5 (H) 0.0 - 0.0 /100 WBCs    RBC 3.52 (L) 4.00 - 5.20 x10*6/uL    Hemoglobin 7.9 (L) 12.0 - 16.0 g/dL    Hematocrit 26.8 (L) 36.0 - 46.0 %    MCV 76 (L) 80 - 100 fL    MCH 22.4 (L) 26.0 - 34.0 pg    MCHC 29.5 (L) 32.0 - 36.0 g/dL    RDW 16.4 (H) 11.5 - 14.5 %    Platelets 240 150 - 450 x10*3/uL   POCT GLUCOSE   Result Value Ref Range    POCT Glucose 158 (H) 74 - 99 mg/dL                Assessment/Plan        Principal Problem:    Closed right hip fracture, initial encounter (Multi)  Active Problems:    Benign essential HTN    3-vessel coronary artery disease    Diabetes mellitus type 2, noninsulin dependent (Multi)    Red blood cell antibody positive    History of endovascular stent graft for abdominal aortic aneurysm (AAA)    Pacemaker    Cont with dvt ppx   Avoid asa  Ot pt   Pain control   Resumed homemeds  Dc plan snf            Jacky Hendrix MD

## 2024-07-09 NOTE — CARE PLAN
The patient's goals for the shift include      The clinical goals for the shift include Patient will remain comfortable throughout the shift.    Over the shift, the patient did not make progress toward the following goals. Barriers to progression include right hip ORIF. Recommendations to address these barriers include ambulation and pain meds as needed.

## 2024-07-09 NOTE — PROGRESS NOTES
07/09/24 0704   Discharge Planning   Type of Post Acute Facility Services Skilled nursing   Patient expects to be discharged to: [facility] is in patient's insurance network.  SNF will initiate insurance auth today   Does the patient need discharge transport arranged? Yes   RoundTrip coordination needed? Yes     7/9/24 @ 1201  Insurance is denying SNF.  P2P option is available. [phone], opt# 1, then opt# 2.   Secure chat to attending to inform of P2P.  Kerrie Burgos RN

## 2024-07-09 NOTE — PROGRESS NOTES
Maci Catalan is a 89 y.o. female on day 5 of admission presenting with Closed right hip fracture, initial encounter (Multi).      Subjective   No pain   She is able to walk with walker  At baseline does not use cane or walker  Pain controlled  Dvt ppx is in place  No bleeding        Objective     Last Recorded Vitals  /83 (Patient Position: Lying)   Pulse 75   Temp 36.9 °C (98.4 °F) (Temporal)   Resp 16   Wt 45.8 kg (100 lb 15.5 oz)   SpO2 98%   Intake/Output last 3 Shifts:        Admission Weight  Weight: 45.8 kg (100 lb 15.5 oz) (07/04/24 1627)    Daily Weight  07/04/24 : 45.8 kg (100 lb 15.5 oz)    Image Results  FL less than 1 hour  These images are not reportable by radiology and will not be interpreted   by  Radiologists.  ECG 12 Lead  Atrial-sensed ventricular-paced rhythm  Abnormal ECG  When compared with ECG of 09-APR-2024 11:17,  Vent. rate has increased BY   3 BPM      Physical Exam  Well developed well nurished  No distress  Ao  Face symmetrical   Neck no jvd no bruit  Chest clear  CVS regular  Ext no edema  Abdo soft nontender bs active, no masses  Cns alert appropriate able to move ext   Skin intact  Psych normal affect    Relevant Results  Scheduled medications  fluticasone, 1 spray, Each Nostril, Daily  [Held by provider] furosemide, 40 mg, oral, Daily  heparin (porcine), 5,000 Units, subcutaneous, q8h LISA  [Held by provider] lisinopril, 2.5 mg, oral, Daily  melatonin, 9 mg, oral, Nightly  methocarbamol, 500 mg, oral, 4x daily  metoprolol tartrate, 25 mg, oral, BID  pantoprazole, 40 mg, oral, Daily before breakfast  sucralfate, 1 g, oral, 4x daily      Continuous medications  D5 % and 0.9 % sodium chloride, 50 mL/hr, Last Rate: 50 mL/hr (07/06/24 1539)      PRN medications  PRN medications: acetaminophen **OR** acetaminophen **OR** acetaminophen, acetaminophen, morphine, nitroglycerin, ondansetron **OR** ondansetron, oxyCODONE, oxyCODONE  Results for orders placed or performed  during the hospital encounter of 07/04/24 (from the past 96 hour(s))   CBC   Result Value Ref Range    WBC 6.3 4.4 - 11.3 x10*3/uL    nRBC 0.0 0.0 - 0.0 /100 WBCs    RBC 4.26 4.00 - 5.20 x10*6/uL    Hemoglobin 9.7 (L) 12.0 - 16.0 g/dL    Hematocrit 33.7 (L) 36.0 - 46.0 %    MCV 79 (L) 80 - 100 fL    MCH 22.8 (L) 26.0 - 34.0 pg    MCHC 28.8 (L) 32.0 - 36.0 g/dL    RDW 16.7 (H) 11.5 - 14.5 %    Platelets 229 150 - 450 x10*3/uL   Basic Metabolic Panel   Result Value Ref Range    Glucose 105 (H) 74 - 99 mg/dL    Sodium 140 136 - 145 mmol/L    Potassium 3.5 3.5 - 5.3 mmol/L    Chloride 107 98 - 107 mmol/L    Bicarbonate 18 (L) 21 - 32 mmol/L    Anion Gap 19 10 - 20 mmol/L    Urea Nitrogen 50 (H) 6 - 23 mg/dL    Creatinine 2.09 (H) 0.50 - 1.05 mg/dL    eGFR 22 (L) >60 mL/min/1.73m*2    Calcium 8.3 (L) 8.6 - 10.3 mg/dL   POCT GLUCOSE   Result Value Ref Range    POCT Glucose 124 (H) 74 - 99 mg/dL   Prepare RBC: 2 Units   Result Value Ref Range    PRODUCT CODE M8750Y61     Unit Number [id number]     Unit ABO AB     Unit RH POS     XM INTEP COMP     Dispense Status RE     Blood Expiration Date July 24, 2024 23:59 EDT     PRODUCT BLOOD TYPE 8400     UNIT VOLUME 350     PRODUCT CODE R3817B53     Unit Number [id number]     Unit ABO A     Unit RH POS     XM INTEP COMP     Dispense Status RE     Blood Expiration Date August 05, 2024 23:59 EDT     PRODUCT BLOOD TYPE 6200     UNIT VOLUME 350    POCT GLUCOSE   Result Value Ref Range    POCT Glucose 91 74 - 99 mg/dL   POCT GLUCOSE   Result Value Ref Range    POCT Glucose 189 (H) 74 - 99 mg/dL   CBC   Result Value Ref Range    WBC 6.6 4.4 - 11.3 x10*3/uL    nRBC 0.0 0.0 - 0.0 /100 WBCs    RBC 3.64 (L) 4.00 - 5.20 x10*6/uL    Hemoglobin 8.3 (L) 12.0 - 16.0 g/dL    Hematocrit 27.9 (L) 36.0 - 46.0 %    MCV 77 (L) 80 - 100 fL    MCH 22.8 (L) 26.0 - 34.0 pg    MCHC 29.7 (L) 32.0 - 36.0 g/dL    RDW 16.5 (H) 11.5 - 14.5 %    Platelets 234 150 - 450 x10*3/uL   Basic Metabolic Panel    Result Value Ref Range    Glucose 172 (H) 74 - 99 mg/dL    Sodium 141 136 - 145 mmol/L    Potassium 3.8 3.5 - 5.3 mmol/L    Chloride 109 (H) 98 - 107 mmol/L    Bicarbonate 20 (L) 21 - 32 mmol/L    Anion Gap 16 10 - 20 mmol/L    Urea Nitrogen 38 (H) 6 - 23 mg/dL    Creatinine 1.83 (H) 0.50 - 1.05 mg/dL    eGFR 26 (L) >60 mL/min/1.73m*2    Calcium 7.7 (L) 8.6 - 10.3 mg/dL   POCT GLUCOSE   Result Value Ref Range    POCT Glucose 186 (H) 74 - 99 mg/dL   POCT GLUCOSE   Result Value Ref Range    POCT Glucose 181 (H) 74 - 99 mg/dL   POCT GLUCOSE   Result Value Ref Range    POCT Glucose 295 (H) 74 - 99 mg/dL   POCT GLUCOSE   Result Value Ref Range    POCT Glucose 216 (H) 74 - 99 mg/dL   POCT GLUCOSE   Result Value Ref Range    POCT Glucose 193 (H) 74 - 99 mg/dL   CBC   Result Value Ref Range    WBC 5.6 4.4 - 11.3 x10*3/uL    nRBC 0.0 0.0 - 0.0 /100 WBCs    RBC 3.36 (L) 4.00 - 5.20 x10*6/uL    Hemoglobin 7.7 (L) 12.0 - 16.0 g/dL    Hematocrit 24.8 (L) 36.0 - 46.0 %    MCV 74 (L) 80 - 100 fL    MCH 22.9 (L) 26.0 - 34.0 pg    MCHC 31.0 (L) 32.0 - 36.0 g/dL    RDW 16.5 (H) 11.5 - 14.5 %    Platelets 208 150 - 450 x10*3/uL   Basic Metabolic Panel   Result Value Ref Range    Glucose 204 (H) 74 - 99 mg/dL    Sodium 136 136 - 145 mmol/L    Potassium 3.5 3.5 - 5.3 mmol/L    Chloride 107 98 - 107 mmol/L    Bicarbonate 20 (L) 21 - 32 mmol/L    Anion Gap 13 10 - 20 mmol/L    Urea Nitrogen 29 (H) 6 - 23 mg/dL    Creatinine 1.80 (H) 0.50 - 1.05 mg/dL    eGFR 27 (L) >60 mL/min/1.73m*2    Calcium 8.2 (L) 8.6 - 10.3 mg/dL   POCT GLUCOSE   Result Value Ref Range    POCT Glucose 188 (H) 74 - 99 mg/dL   POCT GLUCOSE   Result Value Ref Range    POCT Glucose 224 (H) 74 - 99 mg/dL   CBC   Result Value Ref Range    WBC 5.1 4.4 - 11.3 x10*3/uL    nRBC 0.4 (H) 0.0 - 0.0 /100 WBCs    RBC 3.29 (L) 4.00 - 5.20 x10*6/uL    Hemoglobin 7.3 (L) 12.0 - 16.0 g/dL    Hematocrit 24.6 (L) 36.0 - 46.0 %    MCV 75 (L) 80 - 100 fL    MCH 22.2 (L) 26.0 - 34.0 pg     MCHC 29.7 (L) 32.0 - 36.0 g/dL    RDW 16.3 (H) 11.5 - 14.5 %    Platelets 216 150 - 450 x10*3/uL   Basic Metabolic Panel   Result Value Ref Range    Glucose 138 (H) 74 - 99 mg/dL    Sodium 134 (L) 136 - 145 mmol/L    Potassium 3.5 3.5 - 5.3 mmol/L    Chloride 106 98 - 107 mmol/L    Bicarbonate 23 21 - 32 mmol/L    Anion Gap 9 (L) 10 - 20 mmol/L    Urea Nitrogen 31 (H) 6 - 23 mg/dL    Creatinine 1.85 (H) 0.50 - 1.05 mg/dL    eGFR 26 (L) >60 mL/min/1.73m*2    Calcium 7.7 (L) 8.6 - 10.3 mg/dL   POCT GLUCOSE   Result Value Ref Range    POCT Glucose 253 (H) 74 - 99 mg/dL   POCT GLUCOSE   Result Value Ref Range    POCT Glucose 182 (H) 74 - 99 mg/dL   POCT GLUCOSE   Result Value Ref Range    POCT Glucose 252 (H) 74 - 99 mg/dL   Hemoglobin and hematocrit, blood   Result Value Ref Range    Hemoglobin 7.7 (L) 12.0 - 16.0 g/dL    Hematocrit 25.8 (L) 36.0 - 46.0 %                Assessment/Plan        Principal Problem:    Closed right hip fracture, initial encounter (Multi)  Active Problems:    Benign essential HTN    3-vessel coronary artery disease    Diabetes mellitus type 2, noninsulin dependent (Multi)    Red blood cell antibody positive    History of endovascular stent graft for abdominal aortic aneurysm (AAA)    Pacemaker    Cont with dvt ppx   Avoid asa  Ot pt   Pain control   Resumed homemeds  Dc plan snf  Check h and h as pt appears to be pale              Jacky Hendrix MD

## 2024-07-10 LAB
ANION GAP SERPL CALC-SCNC: 11 MMOL/L (ref 10–20)
BUN SERPL-MCNC: 19 MG/DL (ref 6–23)
CALCIUM SERPL-MCNC: 7.6 MG/DL (ref 8.6–10.3)
CHLORIDE SERPL-SCNC: 108 MMOL/L (ref 98–107)
CO2 SERPL-SCNC: 22 MMOL/L (ref 21–32)
CREAT SERPL-MCNC: 1.29 MG/DL (ref 0.5–1.05)
EGFRCR SERPLBLD CKD-EPI 2021: 40 ML/MIN/1.73M*2
ERYTHROCYTE [DISTWIDTH] IN BLOOD BY AUTOMATED COUNT: 16.3 % (ref 11.5–14.5)
FERRITIN SERPL-MCNC: 53 NG/ML (ref 8–150)
FOLATE SERPL-MCNC: 9.5 NG/ML
GLUCOSE BLD MANUAL STRIP-MCNC: 176 MG/DL (ref 74–99)
GLUCOSE BLD MANUAL STRIP-MCNC: 190 MG/DL (ref 74–99)
GLUCOSE BLD MANUAL STRIP-MCNC: 97 MG/DL (ref 74–99)
GLUCOSE SERPL-MCNC: 174 MG/DL (ref 74–99)
HCT VFR BLD AUTO: 25.9 % (ref 36–46)
HGB BLD-MCNC: 7.6 G/DL (ref 12–16)
IRON SATN MFR SERPL: 4 % (ref 25–45)
IRON SERPL-MCNC: 11 UG/DL (ref 35–150)
MCH RBC QN AUTO: 22.4 PG (ref 26–34)
MCHC RBC AUTO-ENTMCNC: 29.3 G/DL (ref 32–36)
MCV RBC AUTO: 76 FL (ref 80–100)
NRBC BLD-RTO: 0 /100 WBCS (ref 0–0)
PLATELET # BLD AUTO: 265 X10*3/UL (ref 150–450)
POTASSIUM SERPL-SCNC: 3.5 MMOL/L (ref 3.5–5.3)
RBC # BLD AUTO: 3.4 X10*6/UL (ref 4–5.2)
SODIUM SERPL-SCNC: 137 MMOL/L (ref 136–145)
TIBC SERPL-MCNC: 278 UG/DL (ref 240–445)
UIBC SERPL-MCNC: 267 UG/DL (ref 110–370)
VIT B12 SERPL-MCNC: >2000 PG/ML (ref 211–911)
WBC # BLD AUTO: 5.4 X10*3/UL (ref 4.4–11.3)

## 2024-07-10 PROCEDURE — 2500000004 HC RX 250 GENERAL PHARMACY W/ HCPCS (ALT 636 FOR OP/ED): Performed by: FAMILY MEDICINE

## 2024-07-10 PROCEDURE — 80048 BASIC METABOLIC PNL TOTAL CA: CPT | Performed by: NURSE PRACTITIONER

## 2024-07-10 PROCEDURE — 2500000001 HC RX 250 WO HCPCS SELF ADMINISTERED DRUGS (ALT 637 FOR MEDICARE OP)

## 2024-07-10 PROCEDURE — 2500000001 HC RX 250 WO HCPCS SELF ADMINISTERED DRUGS (ALT 637 FOR MEDICARE OP): Performed by: PHYSICAL THERAPIST

## 2024-07-10 PROCEDURE — 97110 THERAPEUTIC EXERCISES: CPT | Mod: GP,CQ

## 2024-07-10 PROCEDURE — 82947 ASSAY GLUCOSE BLOOD QUANT: CPT

## 2024-07-10 PROCEDURE — 97116 GAIT TRAINING THERAPY: CPT | Mod: GP,CQ

## 2024-07-10 PROCEDURE — 2500000002 HC RX 250 W HCPCS SELF ADMINISTERED DRUGS (ALT 637 FOR MEDICARE OP, ALT 636 FOR OP/ED): Performed by: NURSE PRACTITIONER

## 2024-07-10 PROCEDURE — 2060000001 HC INTERMEDIATE ICU ROOM DAILY

## 2024-07-10 PROCEDURE — 97535 SELF CARE MNGMENT TRAINING: CPT | Mod: GO

## 2024-07-10 PROCEDURE — 36415 COLL VENOUS BLD VENIPUNCTURE: CPT | Performed by: NURSE PRACTITIONER

## 2024-07-10 PROCEDURE — 85027 COMPLETE CBC AUTOMATED: CPT | Performed by: NURSE PRACTITIONER

## 2024-07-10 RX ORDER — FERROUS SULFATE 325(65) MG
65 TABLET ORAL
Status: DISCONTINUED | OUTPATIENT
Start: 2024-07-11 | End: 2024-07-11 | Stop reason: HOSPADM

## 2024-07-10 SDOH — ECONOMIC STABILITY: HOUSING INSECURITY: AT ANY TIME IN THE PAST 12 MONTHS, WERE YOU HOMELESS OR LIVING IN A SHELTER (INCLUDING NOW)?: NO

## 2024-07-10 SDOH — ECONOMIC STABILITY: INCOME INSECURITY: HOW HARD IS IT FOR YOU TO PAY FOR THE VERY BASICS LIKE FOOD, HOUSING, MEDICAL CARE, AND HEATING?: NOT HARD AT ALL

## 2024-07-10 SDOH — ECONOMIC STABILITY: INCOME INSECURITY: IN THE LAST 12 MONTHS, WAS THERE A TIME WHEN YOU WERE NOT ABLE TO PAY THE MORTGAGE OR RENT ON TIME?: NO

## 2024-07-10 SDOH — ECONOMIC STABILITY: HOUSING INSECURITY: IN THE PAST 12 MONTHS, HOW MANY TIMES HAVE YOU MOVED WHERE YOU WERE LIVING?: 1

## 2024-07-10 SDOH — ECONOMIC STABILITY: TRANSPORTATION INSECURITY
IN THE PAST 12 MONTHS, HAS THE LACK OF TRANSPORTATION KEPT YOU FROM MEDICAL APPOINTMENTS OR FROM GETTING MEDICATIONS?: NO

## 2024-07-10 SDOH — ECONOMIC STABILITY: TRANSPORTATION INSECURITY
IN THE PAST 12 MONTHS, HAS LACK OF TRANSPORTATION KEPT YOU FROM MEETINGS, WORK, OR FROM GETTING THINGS NEEDED FOR DAILY LIVING?: NO

## 2024-07-10 ASSESSMENT — COGNITIVE AND FUNCTIONAL STATUS - GENERAL
HELP NEEDED FOR BATHING: A LOT
WALKING IN HOSPITAL ROOM: A LITTLE
TURNING FROM BACK TO SIDE WHILE IN FLAT BAD: A LITTLE
TURNING FROM BACK TO SIDE WHILE IN FLAT BAD: A LITTLE
CLIMB 3 TO 5 STEPS WITH RAILING: TOTAL
DRESSING REGULAR LOWER BODY CLOTHING: A LOT
DAILY ACTIVITIY SCORE: 14
DRESSING REGULAR UPPER BODY CLOTHING: A LOT
TOILETING: A LOT
HELP NEEDED FOR BATHING: A LITTLE
TOILETING: A LOT
MOBILITY SCORE: 16
MOVING FROM LYING ON BACK TO SITTING ON SIDE OF FLAT BED WITH BEDRAILS: A LITTLE
MOVING FROM LYING ON BACK TO SITTING ON SIDE OF FLAT BED WITH BEDRAILS: A LITTLE
MOVING TO AND FROM BED TO CHAIR: A LITTLE
STANDING UP FROM CHAIR USING ARMS: A LITTLE
WALKING IN HOSPITAL ROOM: A LITTLE
MOBILITY SCORE: 17
PERSONAL GROOMING: A LOT
CLIMB 3 TO 5 STEPS WITH RAILING: A LOT
STANDING UP FROM CHAIR USING ARMS: A LITTLE
DRESSING REGULAR LOWER BODY CLOTHING: A LITTLE
MOVING TO AND FROM BED TO CHAIR: A LITTLE
DAILY ACTIVITIY SCORE: 20

## 2024-07-10 ASSESSMENT — ACTIVITIES OF DAILY LIVING (ADL): HOME_MANAGEMENT_TIME_ENTRY: 23

## 2024-07-10 ASSESSMENT — PAIN - FUNCTIONAL ASSESSMENT
PAIN_FUNCTIONAL_ASSESSMENT: 0-10

## 2024-07-10 ASSESSMENT — PAIN SCALES - GENERAL
PAINLEVEL_OUTOF10: 8
PAINLEVEL_OUTOF10: 0 - NO PAIN
PAINLEVEL_OUTOF10: 8
PAINLEVEL_OUTOF10: 0 - NO PAIN

## 2024-07-10 NOTE — PROGRESS NOTES
07/10/24 1208   Current Planned Discharge Disposition   Current Planned Discharge Disposition   (SNF vs [facility]: Secure chat to attending at 0710 to inquire if P2P was completed yesterday.  No response as of now.)

## 2024-07-10 NOTE — PROGRESS NOTES
Physical Therapy    Physical Therapy Treatment    Patient Name: Maci Catalan  MRN: [id number]  Today's Date: 7/10/2024  Time Calculation  Start Time: 0948  Stop Time: 1013  Time Calculation (min): 25 min    Assessment/Plan   PT Assessment  End of Session Communication: Bedside nurse  Assessment Comment: pt tolerated tx well  End of Session Patient Position: Alarm on, Up in chair  PT Plan  Inpatient/Swing Bed or Outpatient: Inpatient  PT Plan  Treatment/Interventions: Bed mobility, Transfer training, Gait training, Therapeutic exercise  PT Plan: Ongoing PT  PT Frequency: 5 times per week  PT Discharge Recommendations: Moderate intensity level of continued care  Equipment Recommended upon Discharge: Wheeled walker  PT Recommended Transfer Status: Assist x1  PT - OK to Discharge: Yes (per POC)      General Visit Information:   PT  Visit  PT Received On: 07/10/24  General  Reason for Referral: R hip fx- s/p ORIF w/ nailing by Dr. Barton 7/5  Referred By: OT 7/5 Mick  Past Medical History Relevant to Rehab: PMH: NSTEMI, pacemaker, AAA, pancreatic mass s/p whipple  Prior to Session Communication: Bedside nurse  Patient Position Received: Bed, 3 rail up, Alarm on  General Comment: pt agreeable to tx    Subjective   Precautions:  Precautions  LE Weight Bearing Status: Weight Bearing as Tolerated  Medical Precautions: Fall precautions  Vital Signs:       Objective   Pain:  Pain Assessment  0-10 (Numeric) Pain Score: 0 - No pain  Cognition:  Cognition  Overall Cognitive Status: Within Functional Limits  Coordination:     Postural Control:     Extremity/Trunk Assessments:    Activity Tolerance:     Treatments:  Therapeutic Exercise  Therapeutic Exercise Performed: Yes  Therapeutic Exercise Activity 1: pt performed supine ther ex x15-20 : AP, QS, GS, SAQ, Hip flexion, min vc/tc req         Bed Mobility  Bed Mobility: Yes  Bed Mobility 1  Bed Mobility 1: Supine to sitting  Level of Assistance 1: Close supervision  Bed  Mobility Comments 1: HOB elevated    Ambulation/Gait Training  Ambulation/Gait Training Performed: Yes  Ambulation/Gait Training 1  Surface 1: Level tile  Device 1: Rolling walker  Gait Support Devices: Gait belt  Assistance 1: Contact guard  Quality of Gait 1: Decreased step length  Comments/Distance (ft) 1: 35x2, 15x2 (pt performed with step through gait pattern. pt req x2 standing rest breaks.  pt completed very slowly)  Transfer 1  Technique 1: Sit to stand, Stand to sit  Transfer Device 1: Walker  Transfer Level of Assistance 1: Contact guard  Trials/Comments 1: vc/tc for hand placment    Outcome Measures:  [facility] Basic Mobility  Turning from your back to your side while in a flat bed without using bedrails: A little  Moving from lying on your back to sitting on the side of a flat bed without using bedrails: A little  Moving to and from bed to chair (including a wheelchair): A little  Standing up from a chair using your arms (e.g. wheelchair or bedside chair): A little  To walk in hospital room: A little  Climbing 3-5 steps with railing: Total  Basic Mobility - Total Score: 16    Education Documentation  Mobility Training, taught by Obdulio Lewis PTA at 7/10/2024  4:24 PM.  Learner: Patient  Readiness: Acceptance  Method: Explanation  Response: Verbalizes Understanding    Education Comments  No comments found.        OP EDUCATION:       Encounter Problems       Encounter Problems (Active)       Mobility       STG - Patient will ambulate 150 ft mod I with rolling walker (Progressing)       Start:  07/06/24    Expected End:  07/20/24               PT Transfers       STG - Patient will perform bed mobility mod I (Progressing)       Start:  07/06/24    Expected End:  07/20/24            STG - Patient will transfer sit to and from stand mod I (Progressing)       Start:  07/06/24    Expected End:  07/20/24               Pain - Adult

## 2024-07-10 NOTE — CARE PLAN
The patient's goals for the shift include      The clinical goals for the shift include pain management and increase diastolic BP throughout the shift      Problem: Pain - Adult  Goal: Verbalizes/displays adequate comfort level or baseline comfort level  Outcome: Progressing     Problem: Safety - Adult  Goal: Free from fall injury  Outcome: Progressing     Problem: Discharge Planning  Goal: Discharge to home or other facility with appropriate resources  Outcome: Progressing     Problem: Chronic Conditions and Co-morbidities  Goal: Patient's chronic conditions and co-morbidity symptoms are monitored and maintained or improved  Outcome: Progressing     Problem: Skin  Goal: Decreased wound size/increased tissue granulation at next dressing change  Outcome: Progressing  Flowsheets (Taken 7/5/2024 0853 by Stacie Motley, RN)  Decreased wound size/increased tissue granulation at next dressing change: Protective dressings over bony prominences  Goal: Participates in plan/prevention/treatment measures  Outcome: Progressing  Flowsheets (Taken 7/5/2024 0853 by Stacie Motley RN)  Participates in plan/prevention/treatment measures: Elevate heels  Goal: Prevent/manage excess moisture  Outcome: Progressing  Flowsheets (Taken 7/6/2024 0002 by Jennifer Stevens, RN)  Prevent/manage excess moisture: Cleanse incontinence/protect with barrier cream  Goal: Prevent/minimize sheer/friction injuries  Outcome: Progressing  Flowsheets (Taken 7/10/2024 1324)  Prevent/minimize sheer/friction injuries: HOB 30 degrees or less  Goal: Promote/optimize nutrition  Outcome: Progressing  Flowsheets (Taken 7/6/2024 0002 by Jennifer Stevnes, RN)  Promote/optimize nutrition: Monitor/record intake including meals  Goal: Promote skin healing  Outcome: Progressing  Flowsheets (Taken 7/6/2024 0002 by Jennifer Stevens, RN)  Promote skin healing: Turn/reposition every 2 hours/use positioning/transfer devices

## 2024-07-10 NOTE — NURSING NOTE
Interdisciplinary team present: NP, PT, NM, CC, SW, Orthopedic Coordinator, and bedside RN.  Pain - controlled  Nausea - none  Discharge barrier - disposition, awaiting peer to peer  Discharge plan - SNF vs home  Discharge date/time -  7/10/24

## 2024-07-10 NOTE — PROGRESS NOTES
Occupational Therapy    Occupational Therapy Treatment    Name: Maci Catalan  MRN: [id number]  : 1935  Date: 07/10/24  Time Calculation  Start Time: 1440  Stop Time: 1503  Time Calculation (min): 23 min    Assessment:  OT Assessment: Pt seen for OT tx. Pt making good progress towards goals. Pt may benefit from MOd intnesity therapy at d/c  Prognosis: Good  Barriers to Discharge: None  Evaluation/Treatment Tolerance: Patient tolerated treatment well  Medical Staff Made Aware: Yes  End of Session Communication: Bedside nurse  End of Session Patient Position: Up in chair, Alarm on  Plan:  Treatment Interventions: Visual perceptual retraining, Functional transfer training  OT Frequency: 3 times per week  OT Discharge Recommendations: Moderate intensity level of continued care  Equipment Recommended upon Discharge: Wheeled walker  OT Recommended Transfer Status:  (CGA)  OT - OK to Discharge: Yes    Subjective   Previous Visit Info:  OT Last Visit  OT Received On: 07/10/24  General:  General  Reason for Referral: R hip fx- s/p ORIF w/ nailing by Dr. Barton   Prior to Session Communication: Bedside nurse  Patient Position Received: Bed, 3 rail up, Alarm on  General Comment: Pt agreeable to OT tx  Precautions:  LE Weight Bearing Status: Weight Bearing as Tolerated  Medical Precautions: Fall precautions  Vitals:     Pain Assessment:  Pain Assessment  Pain Assessment: 0-10  0-10 (Numeric) Pain Score: 0 - No pain     Objective   Cognition:  Overall Cognitive Status: Within Functional Limits  Activities of Daily Living:      LE Dressing  LE Dressing: Yes  LE Dressing Adaptive Equipment: Reacher, Sock aide, Dressing stick  Pants Level of Assistance: Setup, Contact guard, Minimal verbal cues, Tactile cues  Sock Level of Assistance: Contact guard, Setup, Minimal verbal cues, Tactile cues  LE Dressing Where Assessed: Chair  LE Dressing Comments: Pt requires increased cues to complete LE ADLS and precautions     Bed  Mobility/Transfers: Bed Mobility  Bed Mobility: Yes  Bed Mobility 1  Bed Mobility 1: Sitting to supine  Level of Assistance 1: Close supervision    Transfers  Transfer: Yes  Transfer 1  Technique 1: Sit to stand, To right  Transfer Device 1: Walker  Transfer Level of Assistance 1: Contact guard  Trials/Comments 1: v/cs to push up from bed to standing and walker placement to assure safe distance      Functional Mobility:  Functional Mobility  Functional Mobility Performed: Yes  Functional Mobility 1  Surface 1: Level tile  Device 1: Rolling walker  Assistance 1: Contact guard  Quality of Functional Mobility 1: Narrow base of support  Comments 1: Pt ambulated with CGA 20 ft  Sitting Balance:  Static Sitting Balance  Static Sitting-Balance Support: Feet supported  Static Sitting-Level of Assistance: Independent  Dynamic Sitting Balance  Dynamic Sitting-Balance Support: Feet supported  Dynamic Sitting-Balance: Reaching for objects, Reaching across midline  Dynamic Sitting-Comments: independent  Standing Balance:  Static Standing Balance  Static Standing-Balance Support: Bilateral upper extremity supported  Static Standing-Level of Assistance: Close supervision  Dynamic Standing Balance  Dynamic Standing-Balance Support: Bilateral upper extremity supported  Dynamic Standing-Balance: Reaching for objects  Dynamic Standing-Comments: contact guard     Therapy/Activity: Therapeutic Activity  Therapeutic Activity Performed: Yes  Therapeutic Activity 1: reviewed use of adaptive equipment  Therapeutic Activity 2: reviewed hip precautions. Pt stated understanding  Outcome Measures:  [facility] Daily Activity  Putting on and taking off regular lower body clothing: A little  Bathing (including washing, rinsing, drying): A little  Putting on and taking off regular upper body clothing: None  Toileting, which includes using toilet, bedpan or urinal: A lot  Taking care of personal grooming such as brushing teeth: None  Eating Meals:  None  Daily Activity - Total Score: 20        Education Documentation  Body Mechanics, taught by Jerica Blair OT at 7/10/2024  3:16 PM.  Learner: Patient  Readiness: Acceptance  Method: Explanation, Demonstration, Teach-back  Response: Verbalizes Understanding, Needs Reinforcement    Precautions, taught by Jerica Blair OT at 7/10/2024  3:16 PM.  Learner: Patient  Readiness: Acceptance  Method: Explanation, Demonstration, Teach-back  Response: Verbalizes Understanding, Needs Reinforcement    ADL Training, taught by Jerica Blair OT at 7/10/2024  3:16 PM.  Learner: Patient  Readiness: Acceptance  Method: Explanation, Demonstration, Teach-back  Response: Verbalizes Understanding, Needs Reinforcement    Education Comments  No comments found.      Goals:  Encounter Problems       Encounter Problems (Active)       ADLs       Patient with complete upper body dressing with modified independent level of assistance donning and doffing all UE clothes with PRN adaptive equipment (Progressing)       Start:  07/06/24    Expected End:  07/20/24            Patient with complete lower body dressing with modified independent level of assistance donning and doffing all LE clothing with PRN adaptive equipment (Progressing)       Start:  07/06/24    Expected End:  07/20/24            Patient will complete daily grooming tasks with modified independent level of assistance and PRN adaptive equipment. (Progressing)       Start:  07/06/24    Expected End:  07/20/24            Patient will complete toileting including hygiene clothing management/hygiene with modified independent level of assistance and PRN adaptive equipment. (Progressing)       Start:  07/06/24    Expected End:  07/20/24               MOBILITY       Patient will perform Functional mobility Household distances/Community Distances with modified independent level of assistance and front wheeled walker in order to improve safety and functional mobility.  (Progressing)       Start:  07/06/24    Expected End:  07/20/24

## 2024-07-11 VITALS
HEIGHT: 60 IN | OXYGEN SATURATION: 94 % | WEIGHT: 100.97 LBS | HEART RATE: 75 BPM | SYSTOLIC BLOOD PRESSURE: 110 MMHG | RESPIRATION RATE: 16 BRPM | DIASTOLIC BLOOD PRESSURE: 54 MMHG | TEMPERATURE: 97.5 F | BODY MASS INDEX: 19.82 KG/M2

## 2024-07-11 LAB
ANION GAP SERPL CALC-SCNC: 13 MMOL/L (ref 10–20)
BUN SERPL-MCNC: 19 MG/DL (ref 6–23)
CALCIUM SERPL-MCNC: 7.7 MG/DL (ref 8.6–10.3)
CHLORIDE SERPL-SCNC: 107 MMOL/L (ref 98–107)
CO2 SERPL-SCNC: 22 MMOL/L (ref 21–32)
CREAT SERPL-MCNC: 1.37 MG/DL (ref 0.5–1.05)
EGFRCR SERPLBLD CKD-EPI 2021: 37 ML/MIN/1.73M*2
ERYTHROCYTE [DISTWIDTH] IN BLOOD BY AUTOMATED COUNT: 16.4 % (ref 11.5–14.5)
GLUCOSE BLD MANUAL STRIP-MCNC: 160 MG/DL (ref 74–99)
GLUCOSE BLD MANUAL STRIP-MCNC: 166 MG/DL (ref 74–99)
GLUCOSE BLD MANUAL STRIP-MCNC: 189 MG/DL (ref 74–99)
GLUCOSE SERPL-MCNC: 137 MG/DL (ref 74–99)
HCT VFR BLD AUTO: 25.2 % (ref 36–46)
HGB BLD-MCNC: 7.7 G/DL (ref 12–16)
MCH RBC QN AUTO: 23 PG (ref 26–34)
MCHC RBC AUTO-ENTMCNC: 30.6 G/DL (ref 32–36)
MCV RBC AUTO: 75 FL (ref 80–100)
NRBC BLD-RTO: 0.4 /100 WBCS (ref 0–0)
PLATELET # BLD AUTO: 274 X10*3/UL (ref 150–450)
POTASSIUM SERPL-SCNC: 3.7 MMOL/L (ref 3.5–5.3)
RBC # BLD AUTO: 3.35 X10*6/UL (ref 4–5.2)
SODIUM SERPL-SCNC: 138 MMOL/L (ref 136–145)
WBC # BLD AUTO: 5.3 X10*3/UL (ref 4.4–11.3)

## 2024-07-11 PROCEDURE — 97110 THERAPEUTIC EXERCISES: CPT | Mod: GP,CQ

## 2024-07-11 PROCEDURE — 2500000001 HC RX 250 WO HCPCS SELF ADMINISTERED DRUGS (ALT 637 FOR MEDICARE OP): Performed by: PHYSICAL THERAPIST

## 2024-07-11 PROCEDURE — 2500000002 HC RX 250 W HCPCS SELF ADMINISTERED DRUGS (ALT 637 FOR MEDICARE OP, ALT 636 FOR OP/ED): Performed by: NURSE PRACTITIONER

## 2024-07-11 PROCEDURE — 80048 BASIC METABOLIC PNL TOTAL CA: CPT | Performed by: NURSE PRACTITIONER

## 2024-07-11 PROCEDURE — 2500000001 HC RX 250 WO HCPCS SELF ADMINISTERED DRUGS (ALT 637 FOR MEDICARE OP): Performed by: NURSE PRACTITIONER

## 2024-07-11 PROCEDURE — 36415 COLL VENOUS BLD VENIPUNCTURE: CPT | Performed by: NURSE PRACTITIONER

## 2024-07-11 PROCEDURE — 82947 ASSAY GLUCOSE BLOOD QUANT: CPT

## 2024-07-11 PROCEDURE — 2500000004 HC RX 250 GENERAL PHARMACY W/ HCPCS (ALT 636 FOR OP/ED): Performed by: FAMILY MEDICINE

## 2024-07-11 PROCEDURE — 97116 GAIT TRAINING THERAPY: CPT | Mod: GP,CQ

## 2024-07-11 PROCEDURE — 85027 COMPLETE CBC AUTOMATED: CPT | Performed by: NURSE PRACTITIONER

## 2024-07-11 RX ORDER — OXYCODONE HYDROCHLORIDE 5 MG/1
5 TABLET ORAL EVERY 6 HOURS PRN
Qty: 12 TABLET | Refills: 0 | Status: SHIPPED | OUTPATIENT
Start: 2024-07-11

## 2024-07-11 RX ORDER — FERROUS SULFATE 325(65) MG
325 TABLET ORAL
Qty: 30 TABLET | Refills: 0 | Status: SHIPPED | OUTPATIENT
Start: 2024-07-11 | End: 2024-08-10

## 2024-07-11 RX ORDER — LISINOPRIL 2.5 MG/1
2.5 TABLET ORAL DAILY
COMMUNITY
Start: 2024-07-11

## 2024-07-11 ASSESSMENT — COGNITIVE AND FUNCTIONAL STATUS - GENERAL
MOVING TO AND FROM BED TO CHAIR: A LITTLE
MOBILITY SCORE: 18
MOVING FROM LYING ON BACK TO SITTING ON SIDE OF FLAT BED WITH BEDRAILS: A LITTLE
STANDING UP FROM CHAIR USING ARMS: A LITTLE
CLIMB 3 TO 5 STEPS WITH RAILING: A LITTLE
WALKING IN HOSPITAL ROOM: A LITTLE
TURNING FROM BACK TO SIDE WHILE IN FLAT BAD: A LITTLE

## 2024-07-11 ASSESSMENT — PAIN SCALES - GENERAL: PAINLEVEL_OUTOF10: 0 - NO PAIN

## 2024-07-11 NOTE — PROGRESS NOTES
Physical Therapy    Physical Therapy Treatment    Patient Name: Maci Catalan  MRN: [id number]  Today's Date: 7/11/2024  Time Calculation  Start Time: 0857  Stop Time: 0924  Time Calculation (min): 27 min    Assessment/Plan   PT Assessment  End of Session Communication: Bedside nurse  Assessment Comment: pt graham tx well  End of Session Patient Position: Alarm on, Up in chair  PT Plan  Inpatient/Swing Bed or Outpatient: Inpatient  PT Plan  Treatment/Interventions: Bed mobility, Transfer training, Gait training  PT Plan: Ongoing PT  PT Frequency: 5 times per week  PT Discharge Recommendations: Moderate intensity level of continued care  Equipment Recommended upon Discharge: Wheeled walker  PT Recommended Transfer Status: Assist x1  PT - OK to Discharge: Yes (per POC)      General Visit Information:   PT  Visit  PT Received On: 07/11/24  General  Reason for Referral: R hip fx- s/p ORIF w/ nailing by Dr. Barton 7/5  Referred By: OT 7/5 Mick  Prior to Session Communication: Bedside nurse  Patient Position Received: Bed, 3 rail up, Alarm on  General Comment: pt agreeable to tx    Subjective   Precautions:  Precautions  LE Weight Bearing Status: Weight Bearing as Tolerated  Medical Precautions: Fall precautions  Vital Signs:       Objective   Pain:  Pain Assessment  0-10 (Numeric) Pain Score: 0 - No pain  Pain Type: Acute pain  Pain Location: Hip  Pain Orientation: Right  Pain Interventions: Medication (See MAR)  Cognition:  Cognition  Overall Cognitive Status: Within Functional Limits  Coordination:     Postural Control:     Extremity/Trunk Assessments:    Activity Tolerance:     Treatments:  Therapeutic Exercise  Therapeutic Exercise Performed: Yes  Therapeutic Exercise Activity 1: pt performed supine ther ex x15-20 : AP, QS, GS, SAQ, Hip flexion. vc/tc req         Bed Mobility  Bed Mobility: Yes  Bed Mobility 1  Bed Mobility 1: Supine to sitting  Level of Assistance 1: Close supervision  Bed Mobility Comments 1: HOB  min elevated    Ambulation/Gait Training  Ambulation/Gait Training Performed: Yes  Ambulation/Gait Training 1  Surface 1: Level tile  Device 1: Rolling walker  Gait Support Devices: Gait belt  Assistance 1: Close supervision  Comments/Distance (ft) 1: 75x2 (pt performed with slow step through gait pattern. no overt LOB. min vc for forward gaze)  Transfer 1  Technique 1: Sit to stand, Stand to sit  Transfer Device 1: Walker  Transfer Level of Assistance 1: Close supervision  Trials/Comments 1: pt performed slowly    Outcome Measures:  [facility] Basic Mobility  Turning from your back to your side while in a flat bed without using bedrails: A little  Moving from lying on your back to sitting on the side of a flat bed without using bedrails: A little  Moving to and from bed to chair (including a wheelchair): A little  Standing up from a chair using your arms (e.g. wheelchair or bedside chair): A little  To walk in hospital room: A little  Climbing 3-5 steps with railing: A little  Basic Mobility - Total Score: 18    Education Documentation  Mobility Training, taught by Obdulio Lewis PTA at 7/11/2024  3:57 PM.  Learner: Patient  Readiness: Acceptance  Method: Explanation  Response: Verbalizes Understanding    Education Comments  No comments found.        OP EDUCATION:       Encounter Problems       Encounter Problems (Active)       Mobility       STG - Patient will ambulate 150 ft mod I with rolling walker (Progressing)       Start:  07/06/24    Expected End:  07/20/24               PT Transfers       STG - Patient will perform bed mobility mod I (Progressing)       Start:  07/06/24    Expected End:  07/20/24            STG - Patient will transfer sit to and from stand mod I (Progressing)       Start:  07/06/24    Expected End:  07/20/24               Pain - Adult

## 2024-07-11 NOTE — PROGRESS NOTES
Maci Catalan is a 89 y.o. female presenting with Closed right hip fracture, initial encounter (Multi).      Subjective   No pain   She is able to walk with walker  At baseline does not use cane or walker  Pain controlled  Dvt ppx is in place  No bleeding        Objective     Last Recorded Vitals  /73 (BP Location: Left arm, Patient Position: Lying)   Pulse 69   Temp 37 °C (98.6 °F) (Oral)   Resp 16   Wt 45.8 kg (100 lb 15.5 oz)   SpO2 92%   Intake/Output last 3 Shifts:        Admission Weight  Weight: 45.8 kg (100 lb 15.5 oz) (07/04/24 1627)    Daily Weight  07/04/24 : 45.8 kg (100 lb 15.5 oz)    Image Results  FL less than 1 hour  These images are not reportable by radiology and will not be interpreted   by  Radiologists.  ECG 12 Lead  Atrial-sensed ventricular-paced rhythm  Abnormal ECG  When compared with ECG of 09-APR-2024 11:17,  Vent. rate has increased BY   3 BPM      Physical Exam  Well developed well nurished  No distress  Ao  Face symmetrical   Neck no jvd no bruit  Chest clear  CVS regular  Ext no edema  Abdo soft nontender bs active, no masses  Cns alert appropriate able to move ext   Skin intact  Psych normal affect    Relevant Results  Scheduled medications  ferrous sulfate (325 mg ferrous sulfate), 65 mg of iron, oral, Daily with breakfast  fluticasone, 1 spray, Each Nostril, Daily  [Held by provider] furosemide, 40 mg, oral, Daily  heparin (porcine), 5,000 Units, subcutaneous, q8h LISA  insulin lispro, 0-10 Units, subcutaneous, TID  iron sucrose, 200 mg, intravenous, Once  [Held by provider] lisinopril, 2.5 mg, oral, Daily  melatonin, 9 mg, oral, Nightly  methocarbamol, 500 mg, oral, 4x daily  metoprolol tartrate, 25 mg, oral, BID  pantoprazole, 40 mg, oral, Daily before breakfast  sucralfate, 1 g, oral, 4x daily      Continuous medications       PRN medications  PRN medications: acetaminophen **OR** acetaminophen **OR** acetaminophen, acetaminophen, dextrose, dextrose, glucagon,  glucagon, morphine, nitroglycerin, ondansetron **OR** ondansetron, oxyCODONE, oxyCODONE  Results for orders placed or performed during the hospital encounter of 07/04/24 (from the past 96 hour(s))   POCT GLUCOSE   Result Value Ref Range    POCT Glucose 216 (H) 74 - 99 mg/dL   POCT GLUCOSE   Result Value Ref Range    POCT Glucose 193 (H) 74 - 99 mg/dL   CBC   Result Value Ref Range    WBC 5.6 4.4 - 11.3 x10*3/uL    nRBC 0.0 0.0 - 0.0 /100 WBCs    RBC 3.36 (L) 4.00 - 5.20 x10*6/uL    Hemoglobin 7.7 (L) 12.0 - 16.0 g/dL    Hematocrit 24.8 (L) 36.0 - 46.0 %    MCV 74 (L) 80 - 100 fL    MCH 22.9 (L) 26.0 - 34.0 pg    MCHC 31.0 (L) 32.0 - 36.0 g/dL    RDW 16.5 (H) 11.5 - 14.5 %    Platelets 208 150 - 450 x10*3/uL   Basic Metabolic Panel   Result Value Ref Range    Glucose 204 (H) 74 - 99 mg/dL    Sodium 136 136 - 145 mmol/L    Potassium 3.5 3.5 - 5.3 mmol/L    Chloride 107 98 - 107 mmol/L    Bicarbonate 20 (L) 21 - 32 mmol/L    Anion Gap 13 10 - 20 mmol/L    Urea Nitrogen 29 (H) 6 - 23 mg/dL    Creatinine 1.80 (H) 0.50 - 1.05 mg/dL    eGFR 27 (L) >60 mL/min/1.73m*2    Calcium 8.2 (L) 8.6 - 10.3 mg/dL   POCT GLUCOSE   Result Value Ref Range    POCT Glucose 188 (H) 74 - 99 mg/dL   POCT GLUCOSE   Result Value Ref Range    POCT Glucose 224 (H) 74 - 99 mg/dL   CBC   Result Value Ref Range    WBC 5.1 4.4 - 11.3 x10*3/uL    nRBC 0.4 (H) 0.0 - 0.0 /100 WBCs    RBC 3.29 (L) 4.00 - 5.20 x10*6/uL    Hemoglobin 7.3 (L) 12.0 - 16.0 g/dL    Hematocrit 24.6 (L) 36.0 - 46.0 %    MCV 75 (L) 80 - 100 fL    MCH 22.2 (L) 26.0 - 34.0 pg    MCHC 29.7 (L) 32.0 - 36.0 g/dL    RDW 16.3 (H) 11.5 - 14.5 %    Platelets 216 150 - 450 x10*3/uL   Basic Metabolic Panel   Result Value Ref Range    Glucose 138 (H) 74 - 99 mg/dL    Sodium 134 (L) 136 - 145 mmol/L    Potassium 3.5 3.5 - 5.3 mmol/L    Chloride 106 98 - 107 mmol/L    Bicarbonate 23 21 - 32 mmol/L    Anion Gap 9 (L) 10 - 20 mmol/L    Urea Nitrogen 31 (H) 6 - 23 mg/dL    Creatinine 1.85 (H)  0.50 - 1.05 mg/dL    eGFR 26 (L) >60 mL/min/1.73m*2    Calcium 7.7 (L) 8.6 - 10.3 mg/dL   POCT GLUCOSE   Result Value Ref Range    POCT Glucose 253 (H) 74 - 99 mg/dL   POCT GLUCOSE   Result Value Ref Range    POCT Glucose 182 (H) 74 - 99 mg/dL   POCT GLUCOSE   Result Value Ref Range    POCT Glucose 252 (H) 74 - 99 mg/dL   Hemoglobin and hematocrit, blood   Result Value Ref Range    Hemoglobin 7.7 (L) 12.0 - 16.0 g/dL    Hematocrit 25.8 (L) 36.0 - 46.0 %   Basic Metabolic Panel   Result Value Ref Range    Glucose 157 (H) 74 - 99 mg/dL    Sodium 137 136 - 145 mmol/L    Potassium 3.9 3.5 - 5.3 mmol/L    Chloride 107 98 - 107 mmol/L    Bicarbonate 21 21 - 32 mmol/L    Anion Gap 13 10 - 20 mmol/L    Urea Nitrogen 25 (H) 6 - 23 mg/dL    Creatinine 1.56 (H) 0.50 - 1.05 mg/dL    eGFR 32 (L) >60 mL/min/1.73m*2    Calcium 7.9 (L) 8.6 - 10.3 mg/dL   CBC   Result Value Ref Range    WBC 5.6 4.4 - 11.3 x10*3/uL    nRBC 0.5 (H) 0.0 - 0.0 /100 WBCs    RBC 3.52 (L) 4.00 - 5.20 x10*6/uL    Hemoglobin 7.9 (L) 12.0 - 16.0 g/dL    Hematocrit 26.8 (L) 36.0 - 46.0 %    MCV 76 (L) 80 - 100 fL    MCH 22.4 (L) 26.0 - 34.0 pg    MCHC 29.5 (L) 32.0 - 36.0 g/dL    RDW 16.4 (H) 11.5 - 14.5 %    Platelets 240 150 - 450 x10*3/uL   POCT GLUCOSE   Result Value Ref Range    POCT Glucose 158 (H) 74 - 99 mg/dL   POCT GLUCOSE   Result Value Ref Range    POCT Glucose 237 (H) 74 - 99 mg/dL   POCT GLUCOSE   Result Value Ref Range    POCT Glucose 132 (H) 74 - 99 mg/dL   POCT GLUCOSE   Result Value Ref Range    POCT Glucose 282 (H) 74 - 99 mg/dL   POCT GLUCOSE   Result Value Ref Range    POCT Glucose 176 (H) 74 - 99 mg/dL   Basic Metabolic Panel   Result Value Ref Range    Glucose 174 (H) 74 - 99 mg/dL    Sodium 137 136 - 145 mmol/L    Potassium 3.5 3.5 - 5.3 mmol/L    Chloride 108 (H) 98 - 107 mmol/L    Bicarbonate 22 21 - 32 mmol/L    Anion Gap 11 10 - 20 mmol/L    Urea Nitrogen 19 6 - 23 mg/dL    Creatinine 1.29 (H) 0.50 - 1.05 mg/dL    eGFR 40 (L) >60  mL/min/1.73m*2    Calcium 7.6 (L) 8.6 - 10.3 mg/dL   CBC   Result Value Ref Range    WBC 5.4 4.4 - 11.3 x10*3/uL    nRBC 0.0 0.0 - 0.0 /100 WBCs    RBC 3.40 (L) 4.00 - 5.20 x10*6/uL    Hemoglobin 7.6 (L) 12.0 - 16.0 g/dL    Hematocrit 25.9 (L) 36.0 - 46.0 %    MCV 76 (L) 80 - 100 fL    MCH 22.4 (L) 26.0 - 34.0 pg    MCHC 29.3 (L) 32.0 - 36.0 g/dL    RDW 16.3 (H) 11.5 - 14.5 %    Platelets 265 150 - 450 x10*3/uL   POCT GLUCOSE   Result Value Ref Range    POCT Glucose 190 (H) 74 - 99 mg/dL   POCT GLUCOSE   Result Value Ref Range    POCT Glucose 97 74 - 99 mg/dL   Basic Metabolic Panel   Result Value Ref Range    Glucose 137 (H) 74 - 99 mg/dL    Sodium 138 136 - 145 mmol/L    Potassium 3.7 3.5 - 5.3 mmol/L    Chloride 107 98 - 107 mmol/L    Bicarbonate 22 21 - 32 mmol/L    Anion Gap 13 10 - 20 mmol/L    Urea Nitrogen 19 6 - 23 mg/dL    Creatinine 1.37 (H) 0.50 - 1.05 mg/dL    eGFR 37 (L) >60 mL/min/1.73m*2    Calcium 7.7 (L) 8.6 - 10.3 mg/dL   CBC   Result Value Ref Range    WBC 5.3 4.4 - 11.3 x10*3/uL    nRBC 0.4 (H) 0.0 - 0.0 /100 WBCs    RBC 3.35 (L) 4.00 - 5.20 x10*6/uL    Hemoglobin 7.7 (L) 12.0 - 16.0 g/dL    Hematocrit 25.2 (L) 36.0 - 46.0 %    MCV 75 (L) 80 - 100 fL    MCH 23.0 (L) 26.0 - 34.0 pg    MCHC 30.6 (L) 32.0 - 36.0 g/dL    RDW 16.4 (H) 11.5 - 14.5 %    Platelets 274 150 - 450 x10*3/uL                Assessment/Plan        Principal Problem:    Closed right hip fracture, initial encounter (Multi)  Active Problems:    Benign essential HTN    3-vessel coronary artery disease    Diabetes mellitus type 2, noninsulin dependent (Multi)    Red blood cell antibody positive    History of endovascular stent graft for abdominal aortic aneurysm (AAA)    Pacemaker    Cont with dvt ppx   Avoid asa  Ot pt   Pain control   Resumed homemeds  Dc plan snf  Called and left message for P2P with insurance            Jacky Hendrix MD

## 2024-07-11 NOTE — PROGRESS NOTES
Date 07/11/24   Time 0430 am    Maci Catalan is a 89 y.o. female on day 7 of admission presenting with Closed right hip fracture, initial encounter (Multi).      Subjective   Pt resting in bed  No ac issues over night  No pain         Objective     Last Recorded Vitals  /73   Pulse 74   Temp 36.8 °C (98.2 °F)   Resp 16   Wt 45.8 kg (100 lb 15.5 oz)   SpO2 93%   Intake/Output last 3 Shifts:        Admission Weight  Weight: 45.8 kg (100 lb 15.5 oz) (07/04/24 1627)    Daily Weight  07/04/24 : 45.8 kg (100 lb 15.5 oz)    Image Results  FL less than 1 hour  These images are not reportable by radiology and will not be interpreted   by  Radiologists.  ECG 12 Lead  Atrial-sensed ventricular-paced rhythm  Abnormal ECG  When compared with ECG of 09-APR-2024 11:17,  Vent. rate has increased BY   3 BPM      Physical Exam  Well developed well nurished  No distress  Ao  Face symmetrical   Neck no jvd no bruit  Chest clear  CVS regular  Ext no edema  Abdo soft nontender bs active, no masses  Cns alert appropriate able to move ext   Skin intact  Psych normal affect    Relevant Results  Scheduled medications  ferrous sulfate (325 mg ferrous sulfate), 65 mg of iron, oral, Daily with breakfast  fluticasone, 1 spray, Each Nostril, Daily  [Held by provider] furosemide, 40 mg, oral, Daily  heparin (porcine), 5,000 Units, subcutaneous, q8h LISA  insulin lispro, 0-10 Units, subcutaneous, TID  iron sucrose, 200 mg, intravenous, Once  [Held by provider] lisinopril, 2.5 mg, oral, Daily  melatonin, 9 mg, oral, Nightly  methocarbamol, 500 mg, oral, 4x daily  metoprolol tartrate, 25 mg, oral, BID  pantoprazole, 40 mg, oral, Daily before breakfast  sucralfate, 1 g, oral, 4x daily      Continuous medications       PRN medications  PRN medications: acetaminophen **OR** acetaminophen **OR** acetaminophen, acetaminophen, dextrose, dextrose, glucagon, glucagon, morphine, nitroglycerin, ondansetron **OR** ondansetron, oxyCODONE,  oxyCODONE  Results for orders placed or performed during the hospital encounter of 07/04/24 (from the past 96 hour(s))   POCT GLUCOSE   Result Value Ref Range    POCT Glucose 216 (H) 74 - 99 mg/dL   POCT GLUCOSE   Result Value Ref Range    POCT Glucose 193 (H) 74 - 99 mg/dL   CBC   Result Value Ref Range    WBC 5.6 4.4 - 11.3 x10*3/uL    nRBC 0.0 0.0 - 0.0 /100 WBCs    RBC 3.36 (L) 4.00 - 5.20 x10*6/uL    Hemoglobin 7.7 (L) 12.0 - 16.0 g/dL    Hematocrit 24.8 (L) 36.0 - 46.0 %    MCV 74 (L) 80 - 100 fL    MCH 22.9 (L) 26.0 - 34.0 pg    MCHC 31.0 (L) 32.0 - 36.0 g/dL    RDW 16.5 (H) 11.5 - 14.5 %    Platelets 208 150 - 450 x10*3/uL   Basic Metabolic Panel   Result Value Ref Range    Glucose 204 (H) 74 - 99 mg/dL    Sodium 136 136 - 145 mmol/L    Potassium 3.5 3.5 - 5.3 mmol/L    Chloride 107 98 - 107 mmol/L    Bicarbonate 20 (L) 21 - 32 mmol/L    Anion Gap 13 10 - 20 mmol/L    Urea Nitrogen 29 (H) 6 - 23 mg/dL    Creatinine 1.80 (H) 0.50 - 1.05 mg/dL    eGFR 27 (L) >60 mL/min/1.73m*2    Calcium 8.2 (L) 8.6 - 10.3 mg/dL   POCT GLUCOSE   Result Value Ref Range    POCT Glucose 188 (H) 74 - 99 mg/dL   POCT GLUCOSE   Result Value Ref Range    POCT Glucose 224 (H) 74 - 99 mg/dL   CBC   Result Value Ref Range    WBC 5.1 4.4 - 11.3 x10*3/uL    nRBC 0.4 (H) 0.0 - 0.0 /100 WBCs    RBC 3.29 (L) 4.00 - 5.20 x10*6/uL    Hemoglobin 7.3 (L) 12.0 - 16.0 g/dL    Hematocrit 24.6 (L) 36.0 - 46.0 %    MCV 75 (L) 80 - 100 fL    MCH 22.2 (L) 26.0 - 34.0 pg    MCHC 29.7 (L) 32.0 - 36.0 g/dL    RDW 16.3 (H) 11.5 - 14.5 %    Platelets 216 150 - 450 x10*3/uL   Basic Metabolic Panel   Result Value Ref Range    Glucose 138 (H) 74 - 99 mg/dL    Sodium 134 (L) 136 - 145 mmol/L    Potassium 3.5 3.5 - 5.3 mmol/L    Chloride 106 98 - 107 mmol/L    Bicarbonate 23 21 - 32 mmol/L    Anion Gap 9 (L) 10 - 20 mmol/L    Urea Nitrogen 31 (H) 6 - 23 mg/dL    Creatinine 1.85 (H) 0.50 - 1.05 mg/dL    eGFR 26 (L) >60 mL/min/1.73m*2    Calcium 7.7 (L) 8.6 - 10.3  mg/dL   POCT GLUCOSE   Result Value Ref Range    POCT Glucose 253 (H) 74 - 99 mg/dL   POCT GLUCOSE   Result Value Ref Range    POCT Glucose 182 (H) 74 - 99 mg/dL   POCT GLUCOSE   Result Value Ref Range    POCT Glucose 252 (H) 74 - 99 mg/dL   Hemoglobin and hematocrit, blood   Result Value Ref Range    Hemoglobin 7.7 (L) 12.0 - 16.0 g/dL    Hematocrit 25.8 (L) 36.0 - 46.0 %   Basic Metabolic Panel   Result Value Ref Range    Glucose 157 (H) 74 - 99 mg/dL    Sodium 137 136 - 145 mmol/L    Potassium 3.9 3.5 - 5.3 mmol/L    Chloride 107 98 - 107 mmol/L    Bicarbonate 21 21 - 32 mmol/L    Anion Gap 13 10 - 20 mmol/L    Urea Nitrogen 25 (H) 6 - 23 mg/dL    Creatinine 1.56 (H) 0.50 - 1.05 mg/dL    eGFR 32 (L) >60 mL/min/1.73m*2    Calcium 7.9 (L) 8.6 - 10.3 mg/dL   CBC   Result Value Ref Range    WBC 5.6 4.4 - 11.3 x10*3/uL    nRBC 0.5 (H) 0.0 - 0.0 /100 WBCs    RBC 3.52 (L) 4.00 - 5.20 x10*6/uL    Hemoglobin 7.9 (L) 12.0 - 16.0 g/dL    Hematocrit 26.8 (L) 36.0 - 46.0 %    MCV 76 (L) 80 - 100 fL    MCH 22.4 (L) 26.0 - 34.0 pg    MCHC 29.5 (L) 32.0 - 36.0 g/dL    RDW 16.4 (H) 11.5 - 14.5 %    Platelets 240 150 - 450 x10*3/uL   POCT GLUCOSE   Result Value Ref Range    POCT Glucose 158 (H) 74 - 99 mg/dL   POCT GLUCOSE   Result Value Ref Range    POCT Glucose 237 (H) 74 - 99 mg/dL   POCT GLUCOSE   Result Value Ref Range    POCT Glucose 132 (H) 74 - 99 mg/dL   POCT GLUCOSE   Result Value Ref Range    POCT Glucose 282 (H) 74 - 99 mg/dL   POCT GLUCOSE   Result Value Ref Range    POCT Glucose 176 (H) 74 - 99 mg/dL   Basic Metabolic Panel   Result Value Ref Range    Glucose 174 (H) 74 - 99 mg/dL    Sodium 137 136 - 145 mmol/L    Potassium 3.5 3.5 - 5.3 mmol/L    Chloride 108 (H) 98 - 107 mmol/L    Bicarbonate 22 21 - 32 mmol/L    Anion Gap 11 10 - 20 mmol/L    Urea Nitrogen 19 6 - 23 mg/dL    Creatinine 1.29 (H) 0.50 - 1.05 mg/dL    eGFR 40 (L) >60 mL/min/1.73m*2    Calcium 7.6 (L) 8.6 - 10.3 mg/dL   CBC   Result Value Ref Range     WBC 5.4 4.4 - 11.3 x10*3/uL    nRBC 0.0 0.0 - 0.0 /100 WBCs    RBC 3.40 (L) 4.00 - 5.20 x10*6/uL    Hemoglobin 7.6 (L) 12.0 - 16.0 g/dL    Hematocrit 25.9 (L) 36.0 - 46.0 %    MCV 76 (L) 80 - 100 fL    MCH 22.4 (L) 26.0 - 34.0 pg    MCHC 29.3 (L) 32.0 - 36.0 g/dL    RDW 16.3 (H) 11.5 - 14.5 %    Platelets 265 150 - 450 x10*3/uL   POCT GLUCOSE   Result Value Ref Range    POCT Glucose 190 (H) 74 - 99 mg/dL   POCT GLUCOSE   Result Value Ref Range    POCT Glucose 97 74 - 99 mg/dL                Assessment/Plan        Principal Problem:    Closed right hip fracture, initial encounter (Multi)  Active Problems:    Benign essential HTN    3-vessel coronary artery disease    Diabetes mellitus type 2, noninsulin dependent (Multi)    Red blood cell antibody positive    History of endovascular stent graft for abdominal aortic aneurysm (AAA)    Pacemaker    s/p Right IMN fixation with Dr. Barton on 7/5     Cont with dvt ppx   Avoid asa  Ot pt   Pain control   Resumed homemeds  Dc plan snf  Attending started peer to peer , awaiting call baclk    .       Plan of care discussed with: Provider, RN, Patient.      Patient case and plan of care discussed with Dr. JACQUELYN Hendrix.    GLENIS Norris - CNP  -In collaboration with Dr. JACQUELYN Hendrix    [facility]  Office: [phone]  Fax: [phone]  I have reviewed the above note obtained and documented by the NP/PA and I personally participated in the key components. I have discussed the case and management of the patient's care. Changes made to the note, and all key components of history and physical/progress note done by me.  dw nursing  Dc planning dw nursing   Jacky Hendrix MD

## 2024-07-11 NOTE — CARE PLAN
Patient given Meals on Wheels  info; agrees that she will go  home  with home care  as she does not  want to private  pay for  respite  or aides.     Saritha Le, MSW, LSW

## 2024-07-11 NOTE — DISCHARGE INSTRUCTIONS
Instructions after Repair of Femur Fracture    Weight-bearing: weight-bearing as tolerated on right leg, use walker or crutches for balance    Activity: Either as tolerated or with assistance    May shower with waterproof dressing in place     No driving for 6 weeks or while on narcotic pain medication    Anticoagulation:  - You will be given a prescription for aspirin which you should take twice daily for 42 days total for blood clot prevention   - Chucky hose should be worn 4 weeks post op, may remove at night     Resume normal diet    Wound Care: Remove waterproof dressing on after 7 days and leave open to air. Home Physical Therapy or facility staff can do this. Your incision may be closed with skin glue and steri-strips (small white tape) - may get wet in shower. Do not pick at or peel; allow this to flake off on its own.     You're incision may be closed with staples. If so they are to be removed 2 weeks after surgery. Home health care or facility nursing staff can assist.    You may have a clear stitch at the top and bottom of your incision.  This is normal and is not a concern. These will fall out on their own.  If they are bothersome you may place a bandaid over them to keep them out of the way.    Call the office for refills on pain medications.    Call that same number for antibiotics prior to dental or colonoscopy, Cannot see dentist or colonoscopy for 3 months post surgery    Follow up with Dr. Barton in 2-4 weeks. Call office if not already scheduled.            To schedule  Meals on Wheels thru [place], please  schedule an appointment for registration thru [facility] on [phone] ext  [phone]

## 2024-07-11 NOTE — PROGRESS NOTES
07/11/24 1303   Discharge Planning   Home or Post Acute Services In home services   Type of Home Care Services Home health aide;Home OT;Home PT   Expected Discharge Disposition  Services     Patient's insurance denied going to SNF.  Patient is not happy about having to go home.  Daughter will transport her home per the patient.  DISP: home with [facility]  ADOD: today  Patient will need a walker for discharge.  Secure chat to NP for script so therapy may dispense upon discharge.  Kerrie Burgos RN

## 2024-07-12 ENCOUNTER — HOME HEALTH ADMISSION (OUTPATIENT)
Dept: HOME HEALTH SERVICES | Facility: HOME HEALTH | Age: 89
End: 2024-07-12
Payer: MEDICARE

## 2024-07-12 ENCOUNTER — DOCUMENTATION (OUTPATIENT)
Dept: HOME HEALTH SERVICES | Facility: HOME HEALTH | Age: 89
End: 2024-07-12
Payer: MEDICARE

## 2024-07-12 LAB
ATRIAL RATE: 82 BPM
P AXIS: 63 DEGREES
P OFFSET: 180 MS
P ONSET: 123 MS
PR INTERVAL: 182 MS
Q ONSET: 214 MS
QRS COUNT: 13 BEATS
QRS DURATION: 124 MS
QT INTERVAL: 406 MS
QTC CALCULATION(BAZETT): 474 MS
QTC FREDERICIA: 450 MS
R AXIS: 4 DEGREES
T AXIS: 142 DEGREES
T OFFSET: 417 MS
VENTRICULAR RATE: 82 BPM

## 2024-07-12 NOTE — HH CARE COORDINATION
Home Care received a Referral for Nursing, Physical Therapy, Occupational Therapy, and Home Health Aide. We have processed the referral for a Start of Care on 07/13/2024.     If you have any questions or concerns, please feel free to contact us at [phone]. Follow the prompts, enter your five digit zip code, and you will be directed to your care team on CENTL 1.

## 2024-07-12 NOTE — NURSING NOTE

## 2024-07-14 ENCOUNTER — HOME CARE VISIT (OUTPATIENT)
Dept: HOME HEALTH SERVICES | Facility: HOME HEALTH | Age: 89
End: 2024-07-14
Payer: MEDICARE

## 2024-07-14 VITALS
WEIGHT: 98 LBS | TEMPERATURE: 98 F | BODY MASS INDEX: 19.24 KG/M2 | OXYGEN SATURATION: 99 % | HEART RATE: 68 BPM | DIASTOLIC BLOOD PRESSURE: 60 MMHG | RESPIRATION RATE: 16 BRPM | SYSTOLIC BLOOD PRESSURE: 130 MMHG | HEIGHT: 60 IN

## 2024-07-14 PROCEDURE — 1090000002 HH PPS REVENUE DEBIT

## 2024-07-14 PROCEDURE — 1090000001 HH PPS REVENUE CREDIT

## 2024-07-14 PROCEDURE — 0023 HH SOC

## 2024-07-14 PROCEDURE — 169592 NO-PAY CLAIM PROCEDURE

## 2024-07-14 PROCEDURE — G0299 HHS/HOSPICE OF RN EA 15 MIN: HCPCS | Mod: HHH

## 2024-07-14 ASSESSMENT — ENCOUNTER SYMPTOMS
CHANGE IN APPETITE: UNCHANGED
FATIGUE: 1
FATIGUES EASILY: 1
LAST BOWEL MOVEMENT: 67035
PAIN SEVERITY GOAL: 0/10
LOWEST PAIN SEVERITY IN PAST 24 HOURS: 3/10
APPETITE LEVEL: FAIR
HIGHEST PAIN SEVERITY IN PAST 24 HOURS: 7/10
OCCASIONAL FEELINGS OF UNSTEADINESS: 1
SUBJECTIVE PAIN PROGRESSION: GRADUALLY IMPROVING
LOSS OF SENSATION IN FEET: 0
DEPRESSION: 0
PERSON REPORTING PAIN: PATIENT

## 2024-07-14 ASSESSMENT — PAIN SCALES - PAIN ASSESSMENT IN ADVANCED DEMENTIA (PAINAD)
CONSOLABILITY: 0
BODYLANGUAGE: 0
FACIALEXPRESSION: 0
NEGVOCALIZATION: 0
BREATHING: 0
TOTALSCORE: 0
NEGVOCALIZATION: 0 - NONE.
BODYLANGUAGE: 0 - RELAXED.
FACIALEXPRESSION: 0 - SMILING OR INEXPRESSIVE.
CONSOLABILITY: 0 - NO NEED TO CONSOLE.

## 2024-07-14 ASSESSMENT — ACTIVITIES OF DAILY LIVING (ADL)
ENTERING_EXITING_HOME: NEEDS ASSISTANCE
OASIS_M1830: 05

## 2024-07-15 ENCOUNTER — HOME CARE VISIT (OUTPATIENT)
Dept: HOME HEALTH SERVICES | Facility: HOME HEALTH | Age: 89
End: 2024-07-15
Payer: MEDICARE

## 2024-07-15 ENCOUNTER — TELEPHONE (OUTPATIENT)
Dept: ORTHOPEDIC SURGERY | Facility: HOSPITAL | Age: 89
End: 2024-07-15
Payer: MEDICARE

## 2024-07-15 VITALS — TEMPERATURE: 98.3 F | RESPIRATION RATE: 14 BRPM

## 2024-07-15 PROCEDURE — 1090000002 HH PPS REVENUE DEBIT

## 2024-07-15 PROCEDURE — G0156 HHCP-SVS OF AIDE,EA 15 MIN: HCPCS | Mod: HHH

## 2024-07-15 PROCEDURE — 1090000001 HH PPS REVENUE CREDIT

## 2024-07-15 PROCEDURE — G0151 HHCP-SERV OF PT,EA 15 MIN: HCPCS | Mod: HHH

## 2024-07-15 SDOH — HEALTH STABILITY: PHYSICAL HEALTH: PHYSICAL EXERCISE: SIT, SUPINE, STAND

## 2024-07-15 SDOH — HEALTH STABILITY: PHYSICAL HEALTH: EXERCISE TYPE: R LE

## 2024-07-15 SDOH — HEALTH STABILITY: PHYSICAL HEALTH: PHYSICAL EXERCISE: 15

## 2024-07-15 SDOH — HEALTH STABILITY: PHYSICAL HEALTH: EXERCISE ACTIVITY: HIP 3 WAY, TKE, HEEL RAISES, KNEE BENDS, HS

## 2024-07-15 ASSESSMENT — ENCOUNTER SYMPTOMS
PAIN LOCATION - PAIN DURATION: MIN
PAIN: 1
PERSON REPORTING PAIN: PATIENT
PAIN LOCATION: RIGHT HIP
LIMITED RANGE OF MOTION: 1
PAIN LOCATION - EXACERBATING FACTORS: STAND
LOWEST PAIN SEVERITY IN PAST 24 HOURS: 1/10
SUBJECTIVE PAIN PROGRESSION: WAXING AND WANING
MUSCLE WEAKNESS: 1
PAIN LOCATION - PAIN QUALITY: ACHE
HIGHEST PAIN SEVERITY IN PAST 24 HOURS: 3/10
PAIN SEVERITY GOAL: 0/10
PAIN LOCATION - PAIN SEVERITY: 3/10
PAIN LOCATION - RELIEVING FACTORS: MEDS
PAIN LOCATION - PAIN FREQUENCY: INFREQUENT

## 2024-07-15 ASSESSMENT — ACTIVITIES OF DAILY LIVING (ADL)
AMBULATION_DISTANCE/DURATION_TOLERATED: 50 FT
PHYSICAL TRANSFERS ASSESSED: 1
CURRENT_FUNCTION: INDEPENDENT

## 2024-07-15 NOTE — TELEPHONE ENCOUNTER
Hal from Homecare called. Wants to know if they take the dressing off now can the staples get wet, or should they leave them on until she has her POV with you.

## 2024-07-16 PROCEDURE — 1090000001 HH PPS REVENUE CREDIT

## 2024-07-16 PROCEDURE — 1090000002 HH PPS REVENUE DEBIT

## 2024-07-17 ENCOUNTER — HOME CARE VISIT (OUTPATIENT)
Dept: HOME HEALTH SERVICES | Facility: HOME HEALTH | Age: 89
End: 2024-07-17
Payer: MEDICARE

## 2024-07-17 DIAGNOSIS — S72.001A CLOSED RIGHT HIP FRACTURE, INITIAL ENCOUNTER (MULTI): ICD-10-CM

## 2024-07-17 PROCEDURE — G0152 HHCP-SERV OF OT,EA 15 MIN: HCPCS | Mod: HHH

## 2024-07-17 PROCEDURE — 1090000001 HH PPS REVENUE CREDIT

## 2024-07-17 PROCEDURE — G0156 HHCP-SVS OF AIDE,EA 15 MIN: HCPCS | Mod: HHH

## 2024-07-17 PROCEDURE — 1090000002 HH PPS REVENUE DEBIT

## 2024-07-17 ASSESSMENT — ENCOUNTER SYMPTOMS
HIGHEST PAIN SEVERITY IN PAST 24 HOURS: 7/10
LOWEST PAIN SEVERITY IN PAST 24 HOURS: 1/10
PAIN LOCATION: RIGHT HIP
PAIN: 1
SUBJECTIVE PAIN PROGRESSION: WAXING AND WANING
PERSON REPORTING PAIN: PATIENT

## 2024-07-17 ASSESSMENT — ACTIVITIES OF DAILY LIVING (ADL)
TOILETING: 1
BATHING ASSESSED: 1
DRESSING_LB_CURRENT_FUNCTION: MINIMUM ASSIST
BATHING_CURRENT_FUNCTION: STAND BY ASSIST
TOILETING: INDEPENDENT
PREPARING MEALS: INDEPENDENT

## 2024-07-18 ENCOUNTER — HOME CARE VISIT (OUTPATIENT)
Dept: HOME HEALTH SERVICES | Facility: HOME HEALTH | Age: 89
End: 2024-07-18
Payer: MEDICARE

## 2024-07-18 VITALS — HEART RATE: 80 BPM | OXYGEN SATURATION: 94 %

## 2024-07-18 PROCEDURE — G0151 HHCP-SERV OF PT,EA 15 MIN: HCPCS | Mod: HHH

## 2024-07-18 PROCEDURE — 1090000002 HH PPS REVENUE DEBIT

## 2024-07-18 PROCEDURE — 1090000001 HH PPS REVENUE CREDIT

## 2024-07-18 SDOH — HEALTH STABILITY: PHYSICAL HEALTH: PHYSICAL EXERCISE: 10

## 2024-07-18 SDOH — HEALTH STABILITY: PHYSICAL HEALTH: EXERCISE ACTIVITIES SETS: 2

## 2024-07-18 SDOH — HEALTH STABILITY: PHYSICAL HEALTH: EXERCISE TYPE: R LE

## 2024-07-18 SDOH — HEALTH STABILITY: PHYSICAL HEALTH: EXERCISE ACTIVITY: OPEN/ CLOSED CHAIN EX

## 2024-07-18 SDOH — HEALTH STABILITY: PHYSICAL HEALTH: PHYSICAL EXERCISE: SIT, SUPINE, STAND

## 2024-07-18 ASSESSMENT — ACTIVITIES OF DAILY LIVING (ADL)
CURRENT_FUNCTION: INDEPENDENT
AMBULATION_DISTANCE/DURATION_TOLERATED: 50 FT
AMBULATION ASSISTANCE ON FLAT SURFACES: 1
PHYSICAL TRANSFERS ASSESSED: 1

## 2024-07-18 ASSESSMENT — ENCOUNTER SYMPTOMS
PAIN LOCATION - PAIN SEVERITY: 4/10
LOWEST PAIN SEVERITY IN PAST 24 HOURS: 2/10
PAIN LOCATION - PAIN FREQUENCY: INTERMITTENT
PAIN LOCATION - PAIN DURATION: MIN
PERSON REPORTING PAIN: PATIENT
PAIN SEVERITY GOAL: 1/10
PAIN: 1
MUSCLE WEAKNESS: 1
PAIN LOCATION - RELIEVING FACTORS: MEDS
PAIN LOCATION - EXACERBATING FACTORS: ROM
HIGHEST PAIN SEVERITY IN PAST 24 HOURS: 4/10
PAIN LOCATION - PAIN QUALITY: ACHE
PAIN LOCATION: RIGHT HIP

## 2024-07-19 PROCEDURE — 1090000002 HH PPS REVENUE DEBIT

## 2024-07-19 PROCEDURE — 1090000001 HH PPS REVENUE CREDIT

## 2024-07-20 PROCEDURE — 1090000001 HH PPS REVENUE CREDIT

## 2024-07-20 PROCEDURE — 1090000002 HH PPS REVENUE DEBIT

## 2024-07-22 ENCOUNTER — HOME CARE VISIT (OUTPATIENT)
Dept: HOME HEALTH SERVICES | Facility: HOME HEALTH | Age: 89
End: 2024-07-22
Payer: MEDICARE

## 2024-07-22 VITALS — OXYGEN SATURATION: 95 % | HEART RATE: 82 BPM | RESPIRATION RATE: 14 BRPM

## 2024-07-22 PROCEDURE — G0156 HHCP-SVS OF AIDE,EA 15 MIN: HCPCS | Mod: HHH

## 2024-07-22 PROCEDURE — G0151 HHCP-SERV OF PT,EA 15 MIN: HCPCS | Mod: HHH

## 2024-07-22 SDOH — HEALTH STABILITY: PHYSICAL HEALTH: PHYSICAL EXERCISE: SIT, SUPINE, STAND

## 2024-07-22 SDOH — HEALTH STABILITY: PHYSICAL HEALTH: EXERCISE ACTIVITY: OPEN/ CLOSED CHAIN EX

## 2024-07-22 SDOH — HEALTH STABILITY: PHYSICAL HEALTH: EXERCISE TYPE: R LE

## 2024-07-22 SDOH — HEALTH STABILITY: PHYSICAL HEALTH: PHYSICAL EXERCISE: 15

## 2024-07-22 ASSESSMENT — ENCOUNTER SYMPTOMS
HIGHEST PAIN SEVERITY IN PAST 24 HOURS: 4/10
SUBJECTIVE PAIN PROGRESSION: GRADUALLY IMPROVING
PERSON REPORTING PAIN: PATIENT
PAIN: 1
PAIN SEVERITY GOAL: 0/10
PAIN LOCATION - PAIN FREQUENCY: INTERMITTENT
PAIN LOCATION: RIGHT HIP
PAIN LOCATION - RELIEVING FACTORS: REST
LOWEST PAIN SEVERITY IN PAST 24 HOURS: 1/10
MUSCLE WEAKNESS: 1
PAIN LOCATION - PAIN SEVERITY: 4/10
PAIN LOCATION - PAIN QUALITY: ACHE
PAIN LOCATION - EXACERBATING FACTORS: EX
PAIN LOCATION - PAIN DURATION: MIN

## 2024-07-22 ASSESSMENT — ACTIVITIES OF DAILY LIVING (ADL)
AMBULATION ASSISTANCE ON FLAT SURFACES: 1
AMBULATION_DISTANCE/DURATION_TOLERATED: 50 FT
AMBULATION ASSISTANCE: 1
AMBULATION ASSISTANCE: INDEPENDENT

## 2024-07-24 ENCOUNTER — HOME CARE VISIT (OUTPATIENT)
Dept: HOME HEALTH SERVICES | Facility: HOME HEALTH | Age: 89
End: 2024-07-24
Payer: MEDICARE

## 2024-07-24 PROCEDURE — G0156 HHCP-SVS OF AIDE,EA 15 MIN: HCPCS | Mod: HHH

## 2024-07-25 ENCOUNTER — HOME CARE VISIT (OUTPATIENT)
Dept: HOME HEALTH SERVICES | Facility: HOME HEALTH | Age: 89
End: 2024-07-25
Payer: MEDICARE

## 2024-07-25 VITALS — HEART RATE: 46 BPM | RESPIRATION RATE: 16 BRPM | OXYGEN SATURATION: 88 %

## 2024-07-25 PROCEDURE — G0151 HHCP-SERV OF PT,EA 15 MIN: HCPCS | Mod: HHH

## 2024-07-25 SDOH — HEALTH STABILITY: PHYSICAL HEALTH: EXERCISE TYPE: RLE

## 2024-07-25 SDOH — HEALTH STABILITY: PHYSICAL HEALTH: PHYSICAL EXERCISE: 15

## 2024-07-25 SDOH — HEALTH STABILITY: PHYSICAL HEALTH: EXERCISE ACTIVITY: OPEN/ CLOSED CHAIN EX

## 2024-07-25 SDOH — HEALTH STABILITY: PHYSICAL HEALTH: PHYSICAL EXERCISE: STAND, SIT

## 2024-07-25 ASSESSMENT — ENCOUNTER SYMPTOMS
PERSON REPORTING PAIN: PATIENT
PAIN LOCATION - RELIEVING FACTORS: MEDS
PAIN SEVERITY GOAL: 0/10
PAIN LOCATION - EXACERBATING FACTORS: ROM
HIGHEST PAIN SEVERITY IN PAST 24 HOURS: 4/10
PAIN: 1
PAIN LOCATION - PAIN SEVERITY: 4/10
PAIN LOCATION - PAIN FREQUENCY: INTERMITTENT
MUSCLE WEAKNESS: 1
PAIN LOCATION - PAIN QUALITY: ACHE
LOWEST PAIN SEVERITY IN PAST 24 HOURS: 1/10
PAIN LOCATION: RIGHT HIP
SUBJECTIVE PAIN PROGRESSION: GRADUALLY IMPROVING
PAIN LOCATION - PAIN DURATION: MIN

## 2024-07-25 ASSESSMENT — ACTIVITIES OF DAILY LIVING (ADL)
AMBULATION ASSISTANCE ON FLAT SURFACES: 1
AMBULATION_DISTANCE/DURATION_TOLERATED: 100 FT
PHYSICAL TRANSFERS ASSESSED: 1
CURRENT_FUNCTION: INDEPENDENT

## 2024-07-26 ENCOUNTER — HOME CARE VISIT (OUTPATIENT)
Dept: HOME HEALTH SERVICES | Facility: HOME HEALTH | Age: 89
End: 2024-07-26
Payer: MEDICARE

## 2024-07-26 PROCEDURE — G0152 HHCP-SERV OF OT,EA 15 MIN: HCPCS | Mod: HHH

## 2024-07-27 ASSESSMENT — ACTIVITIES OF DAILY LIVING (ADL)
BATHING ASSESSED: 1
BATHING_CURRENT_FUNCTION: INDEPENDENT
DRESSING_LB_CURRENT_FUNCTION: INDEPENDENT

## 2024-07-30 ENCOUNTER — HOME CARE VISIT (OUTPATIENT)
Dept: HOME HEALTH SERVICES | Facility: HOME HEALTH | Age: 89
End: 2024-07-30
Payer: MEDICARE

## 2024-07-30 VITALS — RESPIRATION RATE: 16 BRPM | OXYGEN SATURATION: 95 % | HEART RATE: 96 BPM

## 2024-07-30 PROCEDURE — G0151 HHCP-SERV OF PT,EA 15 MIN: HCPCS | Mod: HHH

## 2024-07-30 SDOH — HEALTH STABILITY: PHYSICAL HEALTH: EXERCISE TYPE: R LE

## 2024-07-30 ASSESSMENT — ENCOUNTER SYMPTOMS
PAIN LOCATION - EXACERBATING FACTORS: ROM
LOWEST PAIN SEVERITY IN PAST 24 HOURS: 1/10
PAIN: 1
PAIN LOCATION: RIGHT HIP
MUSCLE WEAKNESS: 1
PAIN LOCATION - PAIN FREQUENCY: INTERMITTENT
PAIN LOCATION - PAIN QUALITY: ACHE
PAIN SEVERITY GOAL: 0/10
PAIN LOCATION - PAIN DURATION: MIN
PAIN LOCATION - PAIN SEVERITY: 3/10
HIGHEST PAIN SEVERITY IN PAST 24 HOURS: 3/10
PERSON REPORTING PAIN: PATIENT

## 2024-07-30 ASSESSMENT — ACTIVITIES OF DAILY LIVING (ADL)
OASIS_M1830: 00
AMBULATION ASSISTANCE: INDEPENDENT
HOME_HEALTH_OASIS: 00
AMBULATION ASSISTANCE: 1
AMBULATION ASSISTANCE ON FLAT SURFACES: 1
AMBULATION_DISTANCE/DURATION_TOLERATED: 100 FT

## 2024-07-31 ENCOUNTER — OFFICE VISIT (OUTPATIENT)
Dept: ORTHOPEDIC SURGERY | Facility: CLINIC | Age: 89
End: 2024-07-31
Payer: MEDICARE

## 2024-07-31 ENCOUNTER — HOSPITAL ENCOUNTER (OUTPATIENT)
Dept: RADIOLOGY | Facility: CLINIC | Age: 89
Discharge: HOME | End: 2024-07-31
Payer: MEDICARE

## 2024-07-31 VITALS — WEIGHT: 98 LBS | BODY MASS INDEX: 19.24 KG/M2 | HEIGHT: 60 IN

## 2024-07-31 DIAGNOSIS — S72.001A CLOSED RIGHT HIP FRACTURE, INITIAL ENCOUNTER (MULTI): ICD-10-CM

## 2024-07-31 PROCEDURE — 99211 OFF/OP EST MAY X REQ PHY/QHP: CPT | Performed by: STUDENT IN AN ORGANIZED HEALTH CARE EDUCATION/TRAINING PROGRAM

## 2024-07-31 PROCEDURE — 1160F RVW MEDS BY RX/DR IN RCRD: CPT | Performed by: STUDENT IN AN ORGANIZED HEALTH CARE EDUCATION/TRAINING PROGRAM

## 2024-07-31 PROCEDURE — 73502 X-RAY EXAM HIP UNI 2-3 VIEWS: CPT | Mod: RT

## 2024-07-31 PROCEDURE — 1159F MED LIST DOCD IN RCRD: CPT | Performed by: STUDENT IN AN ORGANIZED HEALTH CARE EDUCATION/TRAINING PROGRAM

## 2024-07-31 PROCEDURE — 1111F DSCHRG MED/CURRENT MED MERGE: CPT | Performed by: STUDENT IN AN ORGANIZED HEALTH CARE EDUCATION/TRAINING PROGRAM

## 2024-07-31 PROCEDURE — 1036F TOBACCO NON-USER: CPT | Performed by: STUDENT IN AN ORGANIZED HEALTH CARE EDUCATION/TRAINING PROGRAM

## 2024-07-31 PROCEDURE — 73502 X-RAY EXAM HIP UNI 2-3 VIEWS: CPT | Mod: RIGHT SIDE | Performed by: STUDENT IN AN ORGANIZED HEALTH CARE EDUCATION/TRAINING PROGRAM

## 2024-07-31 ASSESSMENT — ENCOUNTER SYMPTOMS
LOSS OF SENSATION IN FEET: 0
DEPRESSION: 0
OCCASIONAL FEELINGS OF UNSTEADINESS: 1

## 2024-07-31 ASSESSMENT — PAIN - FUNCTIONAL ASSESSMENT: PAIN_FUNCTIONAL_ASSESSMENT: NO/DENIES PAIN

## 2024-07-31 ASSESSMENT — PATIENT HEALTH QUESTIONNAIRE - PHQ9
1. LITTLE INTEREST OR PLEASURE IN DOING THINGS: NOT AT ALL
SUM OF ALL RESPONSES TO PHQ9 QUESTIONS 1 AND 2: 0
2. FEELING DOWN, DEPRESSED OR HOPELESS: NOT AT ALL

## 2024-07-31 NOTE — PROGRESS NOTES
[facility] Progress Note:    CC: Postop evaluation R CMN for IT fx    S: 89 y.o. female who underwent R CMN for IT fx on 7/5/24 sustained after mechanical ground level fall. Has been doing very well since surgery. Ambulating with a cane. Pain is improving, and is much less than it was before surgery. Compliant with her DVT ppx    Objective:    Exam:  Gen: alert, appropriately conversational, no apparent distress  Chest: symmetric chest rise, non-labored breathing  Heart: RRR to peripheral palpation    Right Lower Extremity:  -Surgical incisions c/d/i  -Fires DF/PF/EHL/FHL  -SILT in saph/sural/SPN/DPN distributions  -Foot warm, well perfused  -Palpable DP pulse, brisk cap refill  -Compartments soft and compressible      Imaging:  XR of the R hip , obtained and personally reviewed today, shows well positioned R short CMN.     A/P: 89F approximately 4 weeks out from R hip CMN done for IT fx sustained after mGLF. Recovering appropriately. Has no complaints. Will continue to follow at routine intervals to evaluate for healing.     - Weight Bearing WBAT RLE  - RTC in 3 months with XR R hip with pelvis

## 2024-08-23 NOTE — DISCHARGE SUMMARY
Discharge Diagnosis  Closed right hip fracture, initial encounter (Multi)    Issues Requiring Follow-Up  Rt hip fracture    Discharge Meds     Your medication list        START taking these medications        Instructions Last Dose Given Next Dose Due   apixaban 2.5 mg tablet  Commonly known as: Eliquis      Take 1 tablet (2.5 mg) by mouth 2 times a day. For extended DVT prophylaxis following surgery.       ferrous sulfate (325 mg ferrous sulfate) tablet      Take 1 tablet by mouth once daily with breakfast.       oxyCODONE 5 mg immediate release tablet  Commonly known as: Roxicodone      Take 1 tablet (5 mg) by mouth every 6 hours if needed for severe pain (7 - 10).              CHANGE how you take these medications        Instructions Last Dose Given Next Dose Due   acetaminophen 325 mg tablet  Commonly known as: TylenoL  What changed: Another medication with the same name was added. Make sure you understand how and when to take each.      Take 2 tablets (650 mg) by mouth every 6 hours if needed for mild pain (1 - 3) for up to 14 days.       acetaminophen 325 mg tablet  Commonly known as: Tylenol  What changed: You were already taking a medication with the same name, and this prescription was added. Make sure you understand how and when to take each.      Take 2 tablets (650 mg) by mouth every 4 hours if needed for mild pain (1 - 3) or moderate pain (4 - 6).       lisinopril 2.5 mg tablet  What changed: additional instructions           methocarbamol 500 mg tablet  Commonly known as: Robaxin  What changed:   when to take this  reasons to take this  additional instructions      Take 1 tablet (500 mg) by mouth 4 times a day as needed for muscle spasms.              CONTINUE taking these medications        Instructions Last Dose Given Next Dose Due   fluticasone 50 mcg/actuation nasal spray  Commonly known as: Flonase           furosemide 40 mg tablet  Commonly known as: Lasix      Take 1 tablet (40 mg) by mouth once  daily.       melatonin 10 mg tablet           metFORMIN 500 mg tablet  Commonly known as: Glucophage           metoprolol tartrate 25 mg tablet  Commonly known as: Lopressor      Take 1 tablet (25 mg) by mouth 2 times a day.       nitroglycerin 0.4 mg SL tablet  Commonly known as: Nitrostat           omeprazole 40 mg DR capsule  Commonly known as: PriLOSEC           sucralfate 1 gram tablet  Commonly known as: Carafate                  STOP taking these medications      HYDROcodone-acetaminophen 5-325 mg tablet  Commonly known as: Norco        lidocaine 5 % patch  Commonly known as: Lidoderm                  Where to Get Your Medications        These medications were sent to [facility]/pharmacy #[id number] - [place], [place] - [address]  [address], [facility] [address]      Phone: [phone]   apixaban 2.5 mg tablet  ferrous sulfate (325 mg ferrous sulfate) tablet  oxyCODONE 5 mg immediate release tablet       Information about where to get these medications is not yet available    Ask your nurse or doctor about these medications  acetaminophen 325 mg tablet  methocarbamol 500 mg tablet         Test Results Pending At Discharge  Pending Labs       No current pending labs.            Hospital Course   Pt with fall  Rt hip fracture   Admitted and did have surgery for same   She was seen by ot and pt   Insurance denied snf  Dischaged to home     Pertinent Physical Exam At Time of Discharge  Physical Exam    Outpatient Follow-Up  Future Appointments   Date Time Provider Department Center   9/10/2024 11:00 AM Ruben Sanchez DO [id number] [facility]   9/30/2024  2:00 PM [facility]   9/30/2024  3:00 PM Heladio Higginbotham MD PhD [facility]   10/30/2024  9:15 AM Malik Barton MD [id number] [facility]         Jacky Hendrix MD

## 2024-09-10 ENCOUNTER — OFFICE VISIT (OUTPATIENT)
Dept: CARDIOLOGY | Facility: CLINIC | Age: 89
End: 2024-09-10
Payer: MEDICARE

## 2024-09-10 ENCOUNTER — ANCILLARY PROCEDURE (OUTPATIENT)
Dept: CARDIOLOGY | Facility: CLINIC | Age: 89
End: 2024-09-10
Payer: MEDICARE

## 2024-09-10 VITALS
BODY MASS INDEX: 17.87 KG/M2 | SYSTOLIC BLOOD PRESSURE: 154 MMHG | DIASTOLIC BLOOD PRESSURE: 77 MMHG | WEIGHT: 91 LBS | HEIGHT: 60 IN | HEART RATE: 70 BPM | OXYGEN SATURATION: 96 %

## 2024-09-10 DIAGNOSIS — I25.10 3-VESSEL CORONARY ARTERY DISEASE: ICD-10-CM

## 2024-09-10 DIAGNOSIS — S72.001A CLOSED RIGHT HIP FRACTURE, INITIAL ENCOUNTER (MULTI): ICD-10-CM

## 2024-09-10 DIAGNOSIS — I50.22 CHRONIC SYSTOLIC CONGESTIVE HEART FAILURE (MULTI): Primary | ICD-10-CM

## 2024-09-10 PROCEDURE — 93005 ELECTROCARDIOGRAM TRACING: CPT

## 2024-09-10 PROCEDURE — 99213 OFFICE O/P EST LOW 20 MIN: CPT | Performed by: INTERNAL MEDICINE

## 2024-09-10 PROCEDURE — 3077F SYST BP >= 140 MM HG: CPT | Performed by: INTERNAL MEDICINE

## 2024-09-10 PROCEDURE — 1159F MED LIST DOCD IN RCRD: CPT | Performed by: INTERNAL MEDICINE

## 2024-09-10 PROCEDURE — G2211 COMPLEX E/M VISIT ADD ON: HCPCS | Performed by: INTERNAL MEDICINE

## 2024-09-10 PROCEDURE — 93010 ELECTROCARDIOGRAM REPORT: CPT | Performed by: INTERNAL MEDICINE

## 2024-09-10 PROCEDURE — 1036F TOBACCO NON-USER: CPT | Performed by: INTERNAL MEDICINE

## 2024-09-10 PROCEDURE — 3078F DIAST BP <80 MM HG: CPT | Performed by: INTERNAL MEDICINE

## 2024-09-10 RX ORDER — METOPROLOL TARTRATE 25 MG/1
25 TABLET, FILM COATED ORAL 2 TIMES DAILY
Qty: 200 TABLET | Refills: 3 | Status: SHIPPED | OUTPATIENT
Start: 2024-09-10

## 2024-09-10 RX ORDER — LISINOPRIL 2.5 MG/1
2.5 TABLET ORAL
Qty: 100 TABLET | Refills: 3 | Status: SHIPPED | OUTPATIENT
Start: 2024-09-10 | End: 2025-10-15

## 2024-09-10 RX ORDER — FAMOTIDINE 20 MG/1
20 TABLET, FILM COATED ORAL DAILY
COMMUNITY

## 2024-09-10 RX ORDER — FUROSEMIDE 40 MG/1
40 TABLET ORAL DAILY
Qty: 100 TABLET | Refills: 3 | Status: SHIPPED | OUTPATIENT
Start: 2024-09-10 | End: 2025-09-10

## 2024-09-10 ASSESSMENT — ENCOUNTER SYMPTOMS
DEPRESSION: 0
LOSS OF SENSATION IN FEET: 0
OCCASIONAL FEELINGS OF UNSTEADINESS: 0

## 2024-09-10 NOTE — PROGRESS NOTES
Chief Complaint:   Heart Failure and Follow-up (5 month)     History Of Present Illness:    Maci Catalan is a 89 y.o. female presenting with pertinent medical history including, Hx of Perioperative NSTEMI ( Occuring in setting of EVAR 11/2019, Troponin Peak 18.45 ) three-vessel coronary artery disease S/P PCI (11/22/2019) to proximal LAD with a 2.75 x 12 mm Resolute Rc drug-eluting stent postdilated up to 3.5 mm ) Chronic Systolic Heart Failure with LVEF 35% ( 11/2019 ) with Mid and apical anterior septum, mid and apical inferior septum, and basal and mid inferior wall motion abnormalities, HX of AAA S/P EVAR 11/2019 ) Hx of Complete Heart Block S/P Medtronic Dual Chamber System, and Hx of Pancreatic Tumor S/P Whipple who is seen for follow up.     She is seen in the presence of her Sister and permission is granted to discuss medical care in her in her presence.  Since her last appointment, they have both done very well.  She continues to remain as active as she can.  She has put on some weight, but this is mostly desirable weight gain issues improve nutrition.  She has not noticed any lower extremity edema, abdominal swelling.  She notes no orthopnea, paroxysmal nocturnal dyspnea.  She remains compliant with medications.  She offers no cardiovascular complaints.    9/10/2024 -- She returns for follow up, and notes that she suffered a fall and resultatn Hip Fracture. She was placed on oral apixaban for DVT prophylaxis during her recovery.  She did not have any bleeding or bruising complications.  She has not had any falls.  She unfortunately however continues to lose weight.  Her sister feels that this is more related to poor appetite, and early satiety.  Ms. Amairani Catalan however is very surprised to see the degree of weight loss she has experienced.      Patient denies chest pain and angina.  Pt denies shortness of breath, dyspnea on exertion, orthopnea, and paroxysmal nocturnal dyspnea.  Pt denies  worsening lower extremity edema.  Pt denies palpitations or syncope.  No recent falls.  No fever or chills.  No cough.  No change in bowel or bladder habits.  No travel.  No sick contacts.  No recent travel    12 point review of systems was performed and is otherwise negative.  Past medical history:  As above.  Medications:  Reviewed.  Allergies:  Reviewed.  Social history:  Patient denies smoking, alcohol abuse, or illicit drug use.  Family history:  No sudden cardiac death or premature coronary artery disease.  67     Last Recorded Vitals:  Vitals:    09/10/24 1110   BP: 154/77   Patient Position: Sitting   Pulse: 70   SpO2: 96%   Weight: (!) 41.3 kg (91 lb)   Height: 1.524 m (5')       Weight         9/10/2024  1110             Weight: 41.3 kg (91 lb)              Past Medical History:  She has a past medical history of Personal history of other diseases of the circulatory system and Personal history of other endocrine, nutritional and metabolic disease.    Past Surgical History:  She has a past surgical history that includes Hysterectomy (04/17/2018); Other surgical history (06/19/2018); Cholecystectomy (06/19/2018); Other surgical history (06/21/2019); Other surgical history (06/21/2019); CT angio abdomen pelvis w and or wo IV IV contrast (8/16/2019); CT angio aorta and bilateral iliofemoral runoff w and or wo IV contrast (11/7/2019); IR angiogram aorta abdomen (11/6/2019); and CT angio abdomen pelvis w and or wo IV IV contrast (10/16/2022).      Social History:  She reports that she quit smoking about 7 years ago. Her smoking use included cigarettes. She has been exposed to tobacco smoke. She has never used smokeless tobacco. She reports that she does not drink alcohol and does not use drugs.    Family History:  Family History   Problem Relation Name Age of Onset    Hyperlipidemia Mother      Hyperlipidemia Father          Allergies:  Aspirin, Codeine, and Hydrocortisone    Outpatient Medications:  Current  Outpatient Medications   Medication Instructions    acetaminophen (TYLENOL) 650 mg, oral, Every 4 hours PRN    famotidine (PEPCID) 20 mg, oral, Daily    furosemide (LASIX) 40 mg, oral, Daily, Take with Meals    lisinopril 2.5 mg, oral, Daily with lunch    melatonin 10 mg, oral, Nightly    metFORMIN (Glucophage) 500 mg tablet 1 tablet, oral, Every 12 hours, With food     metoprolol tartrate (LOPRESSOR) 25 mg, oral, 2 times daily, Take with meals    nitroglycerin (Nitrostat) 0.4 mg SL tablet 1 tablet, sublingual, Every 5 min PRN    omeprazole (PriLOSEC) 40 mg DR capsule 1 capsule, oral, 2 times daily (0900,1400)    sucralfate (Carafate) 1 gram tablet 1 tablet, oral, 4 times daily       Physical Exam:  /77 (Patient Position: Sitting)   Pulse 70   Ht 1.524 m (5')   Wt (!) 41.3 kg (91 lb)   SpO2 96%   BMI 17.77 kg/m²   General:  Patient is awake, alert, and oriented.  Patient is in no acute distress.  HEENT:  Pupils equal and reactive.  Normocephalic.  Moist mucosa.    Neck:  No thyromegaly.  Normal Jugular Venous Pressure.  Cardiovascular:  Regular rate and rhythm.  Normal S1 and S2.  1/6 GLORIA.  Pulmonary:  Clear to auscultation bilaterally.  Abdomen:  Soft. Non-tender.   Non-distended.  Positive bowel sounds.  Lower Extremities:  2+ pedal pulses. No LE edema.  Neurologic:  Cranial nerves intact.  No focal deficit.   Skin: Skin warm and dry, normal skin turgor.   Psychiatric: Normal affect.             Last Labs:  CBC -  Lab Results   Component Value Date    WBC 5.3 07/11/2024    HGB 7.7 (L) 07/11/2024    HCT 25.2 (L) 07/11/2024    MCV 75 (L) 07/11/2024     07/11/2024       CMP -  Lab Results   Component Value Date    CALCIUM 7.7 (L) 07/11/2024    PHOS 4.1 04/24/2023    PROT 7.1 03/25/2024    ALBUMIN 4.2 03/25/2024    AST 14 03/25/2024    ALT 11 03/25/2024    ALKPHOS 87 03/25/2024    BILITOT 0.3 03/25/2024       LIPID PANEL -   Lab Results   Component Value Date    CHOL 235 (H) 03/25/2024    TRIG 235  (H) 03/25/2024    HDL 62.8 03/25/2024    CHHDL 3.7 03/25/2024    LDLF 126 (H) 11/10/2021    VLDL 47 (H) 03/25/2024    NHDL 172 (H) 03/25/2024       RENAL FUNCTION PANEL -   Lab Results   Component Value Date    GLUCOSE 137 (H) 07/11/2024     07/11/2024    K 3.7 07/11/2024     07/11/2024    CO2 22 07/11/2024    ANIONGAP 13 07/11/2024    BUN 19 07/11/2024    CREATININE 1.37 (H) 07/11/2024    CALCIUM 7.7 (L) 07/11/2024    PHOS 4.1 04/24/2023    ALBUMIN 4.2 03/25/2024        Lab Results   Component Value Date     (H) 03/25/2024    HGBA1C 8.2 (H) 03/25/2024       Last Cardiology Tests:  ECG:  In Office EKG 10/10/2023  -- AV Pacing   Echo:  Echocardiogram 01/2021  1. The left ventricular systolic function is normal with a 55% estimated ejection fraction.  2. Mid and apical inferior septum and basal inferior segment are abnormal.This is unchanged.  3. Moderately increased left ventricular septal thickness.  4. There is an elevated mean left atrial pressure.  5. Slightly elevated RVSP.  6. The left atrium is abnormally small.  7. The left ventricular cavity size is decreased     Cath:  Cardiac catheterization and PCI 2019  A 4 Irish pigtail was used to cross the aortic valve for LV pressure  measurement and pullback.     Coronary Angiography:  The coronary circulation is right dominant.     Left Main Coronary Artery:  The left main coronary artery is a normal caliber vessel. The left main arises  normally from the left coronary sinus of Valsalva and bifurcates into the LAD  and circumflex coronary arteries. The left main coronary artery showed  calcification. The distal left main coronary artery showed 10 to 30% stenosis.     Left Anterior Descending Coronary Artery Distribution:  The left anterior descending coronary artery is a normal caliber vessel. The LAD  arises normally from the left main coronary artery. The LAD demonstrated  calcification. The proximal left anterior descending coronary artery  showed 40%  stenosis. An additional lesion, located at the proximal left anterior descending  coronary artery, revealed 90% stenosis. The 1st diagonal branch is a normal  caliber vessel. The proximal 1st diagonal branch revealed 80% stenosis. The 2nd  diagonal branch is a small caliber vessel. The proximal 2nd diagonal branch  showed 70% stenosis.        Valve Findings:  No aortic valve stenosis is visualized.     Coronary Interventions:  Angiography reveals a 90% stenosis of the proximal left anterior descending.  Pre-intervention LIZANDRO flow was 3. Percutaneous coronary intervention was  performed within the proximal left anterior descending. The stenosis was  successfully reduced from 90% to 0%. Post-intervention LIZANDRO flow was 3.  Successful PCI of the proximal LAD with a 2.75 x 12 mm Resolute [place]  drug-eluting stent postdilated up to 3.5 mm.     Coronary Intervention Comments:  An Shave Clubari L 3.5 guide was used to engage the left main. A run-through wire was  placed in the distal LAD. A Akira Blue wire was attempted to be placed in the  first diagonal. Due to vessel angulation wire could not be placed there. A 2.0 x  12 mm semi-compliant balloon would not cross the proximal LAD. Subsequently a  guide liner was used to assist in delivering equipment. The lesion was  predilated with a 2.0 x 12 mm semi-compliant balloon at 8 guillermo. The guide liner  was advanced into the LAD while the balloon was inflated. The stent was then  delivered across the lesion. A 2.75 x 12 mm resolute case drug-eluting stent was  then deployed at 10 guillermo. A 3.25 x 8 mm noncompliant balloon would not cross the  mid stent. Subsequently the Akira Blue wire was placed in the distal LAD to be  used as a delia wire. The proximal portion of the stent was postdilated with a  3.25 x 8 mm noncompliant balloon up to 16 guillermo. A 3.25 x 6 mm semi-compliant  balloon was used to post dilate the distal stent up to 14 guillermo. A 3.5 x 6 mm  noncompliant balloon was then  "used to post dilate the entire stented up to 16  guillermo. Final angiography revealed LIZANDRO 3 flow with 0% residual stenosis. The  jailed diagonal had LIZANDRO 3 flow with moderate to severe ostial disease.     Coronary Lesion Summary:  Vessel Stenosis Vessel Segment  Left Main 10 to 30% stenosis distal  LAD 40% stenosis proximal  LAD 90% stenosis proximal  1st Diagonal 80% stenosis proximal  2nd Diagonal 70% stenosis proximal      Lab review: I have Chemistry CMP:   Lab Results   Component Value Date    ALBUMIN 4.2 03/25/2024    CALCIUM 7.7 (L) 07/11/2024    CO2 22 07/11/2024    CREATININE 1.37 (H) 07/11/2024    GLUCOSE 137 (H) 07/11/2024    BILITOT 0.3 03/25/2024    PROT 7.1 03/25/2024    ALT 11 03/25/2024    AST 14 03/25/2024    ALKPHOS 87 03/25/2024   , Chemistry BMP   Lab Results   Component Value Date    GLUCOSE 137 (H) 07/11/2024    CALCIUM 7.7 (L) 07/11/2024    CO2 22 07/11/2024    CREATININE 1.37 (H) 07/11/2024   , CBC:  Lab Results   Component Value Date    WBC 5.3 07/11/2024    RBC 3.35 (L) 07/11/2024    HGB 7.7 (L) 07/11/2024    HCT 25.2 (L) 07/11/2024    MCV 75 (L) 07/11/2024    MCH 23.0 (L) 07/11/2024    MCHC 30.6 (L) 07/11/2024    RDW 16.4 (H) 07/11/2024    NRBC 0.4 (H) 07/11/2024   , Coags:   Lab Results   Component Value Date    APTT 29 07/04/2024    FIBRINOGEN 429 (H) 11/08/2019    INR 1.0 07/04/2024   , Lipids:   Lab Results   Component Value Date    CHOL 235 (H) 03/25/2024    HDL 62.8 03/25/2024    LDLCALC 125 (H) 03/25/2024    TRIG 235 (H) 03/25/2024   , Cardiac Enzymes: No results found for: \"CKTOTAL\", \"CKMB\", \"CKMBINDEX\", \"TROPONINT\", and POC Glucose:   Lab Results   Component Value Date    GLUCOSE 137 (H) 07/11/2024     Diagnostic review: I have personally reviewed the result(s) of the EKG and Echocardiogram .   Imaging review: I have  personally reviewed the result(s) Device Interrogation     Assessment/Plan   Problem List Items Addressed This Visit             ICD-10-CM    3-vessel coronary artery " disease I25.10    Relevant Medications    metoprolol tartrate (Lopressor) 25 mg tablet    lisinopril 2.5 mg tablet    furosemide (Lasix) 40 mg tablet    Chronic systolic congestive heart failure (Multi) - Primary I50.22    Relevant Medications    metoprolol tartrate (Lopressor) 25 mg tablet    Other Relevant Orders    ECG 12 lead (Clinic Performed)    Closed right hip fracture, initial encounter (Multi) S72.001A         Overall, she continues to do well from a cardiovascular standpoint.  She offers no significant complaints.    Her care has been consolidated with both electrophysiology And cardiology at [facility].  We continue to recommend appropriate diet and have liberal goals in order to facilitate improved appetite for healthy weight gain.   She remains without cardiac decompensation at thius time.     Ruben Sanchez, DO

## 2024-09-11 LAB
ATRIAL RATE: 70 BPM
P OFFSET: 149 MS
P ONSET: 104 MS
PR INTERVAL: 132 MS
Q ONSET: 191 MS
QRS COUNT: 11 BEATS
QRS DURATION: 144 MS
QT INTERVAL: 448 MS
QTC CALCULATION(BAZETT): 483 MS
QTC FREDERICIA: 471 MS
R AXIS: 72 DEGREES
T AXIS: -17 DEGREES
T OFFSET: 415 MS
VENTRICULAR RATE: 70 BPM

## 2024-09-23 ENCOUNTER — APPOINTMENT (OUTPATIENT)
Dept: VASCULAR SURGERY | Facility: HOSPITAL | Age: 89
End: 2024-09-23
Payer: MEDICARE

## 2024-09-23 ENCOUNTER — APPOINTMENT (OUTPATIENT)
Dept: VASCULAR MEDICINE | Facility: HOSPITAL | Age: 89
End: 2024-09-23
Payer: MEDICARE

## 2024-09-30 ENCOUNTER — APPOINTMENT (OUTPATIENT)
Dept: VASCULAR MEDICINE | Facility: HOSPITAL | Age: 89
End: 2024-09-30
Payer: MEDICARE

## 2024-09-30 ENCOUNTER — APPOINTMENT (OUTPATIENT)
Dept: VASCULAR SURGERY | Facility: HOSPITAL | Age: 89
End: 2024-09-30
Payer: MEDICARE

## 2024-10-30 ENCOUNTER — APPOINTMENT (OUTPATIENT)
Dept: ORTHOPEDIC SURGERY | Facility: CLINIC | Age: 89
End: 2024-10-30
Payer: MEDICARE

## 2024-11-18 ENCOUNTER — OFFICE VISIT (OUTPATIENT)
Dept: VASCULAR SURGERY | Facility: HOSPITAL | Age: 89
End: 2024-11-18
Payer: MEDICARE

## 2024-11-18 ENCOUNTER — HOSPITAL ENCOUNTER (OUTPATIENT)
Dept: VASCULAR MEDICINE | Facility: HOSPITAL | Age: 89
Discharge: HOME | End: 2024-11-18
Payer: MEDICARE

## 2024-11-18 VITALS
BODY MASS INDEX: 18.06 KG/M2 | HEART RATE: 88 BPM | SYSTOLIC BLOOD PRESSURE: 111 MMHG | DIASTOLIC BLOOD PRESSURE: 56 MMHG | WEIGHT: 92 LBS | HEIGHT: 60 IN

## 2024-11-18 DIAGNOSIS — Z95.828 PRESENCE OF OTHER VASCULAR IMPLANTS AND GRAFTS: ICD-10-CM

## 2024-11-18 DIAGNOSIS — I71.40 ABDOMINAL AORTIC ANEURYSM, WITHOUT RUPTURE, UNSPECIFIED (CMS-HCC): ICD-10-CM

## 2024-11-18 DIAGNOSIS — I71.43 INFRARENAL ABDOMINAL AORTIC ANEURYSM (AAA) WITHOUT RUPTURE (CMS-HCC): Primary | ICD-10-CM

## 2024-11-18 PROCEDURE — 1159F MED LIST DOCD IN RCRD: CPT | Performed by: SURGERY

## 2024-11-18 PROCEDURE — 93978 VASCULAR STUDY: CPT

## 2024-11-18 PROCEDURE — 99214 OFFICE O/P EST MOD 30 MIN: CPT | Mod: 25 | Performed by: SURGERY

## 2024-11-18 PROCEDURE — 99214 OFFICE O/P EST MOD 30 MIN: CPT | Performed by: SURGERY

## 2024-11-18 PROCEDURE — 93978 VASCULAR STUDY: CPT | Performed by: SURGERY

## 2024-11-18 PROCEDURE — 3074F SYST BP LT 130 MM HG: CPT | Performed by: SURGERY

## 2024-11-18 PROCEDURE — 3078F DIAST BP <80 MM HG: CPT | Performed by: SURGERY

## 2024-11-18 NOTE — PROGRESS NOTES
Vascular Surgery Consult/Clinic Note    CC: Follow up    HPI:  This is an 89 year old female s/p EVAR in 11/2019 with Dr. Olson who post operative had L CFA occlusion which required thrombectomy and endarterectomy of the L CFA; her course was also complicated by ACS s/p PCI.   She is here after obtaining aoric duplex.          Meds:   Current Outpatient Medications on File Prior to Visit   Medication Sig Dispense Refill    acetaminophen (Tylenol) 325 mg tablet Take 2 tablets (650 mg) by mouth every 4 hours if needed for mild pain (1 - 3) or moderate pain (4 - 6).      famotidine (Pepcid) 20 mg tablet Take 1 tablet (20 mg) by mouth once daily.      furosemide (Lasix) 40 mg tablet Take 1 tablet (40 mg) by mouth once daily. Take with Meals 100 tablet 3    lisinopril 2.5 mg tablet Take 1 tablet (2.5 mg) by mouth once daily at noon. Take with meals. 100 tablet 3    melatonin 10 mg tablet Take 1 tablet (10 mg) by mouth once daily at bedtime.      metFORMIN (Glucophage) 500 mg tablet Take 1 tablet (500 mg) by mouth every 12 hours. With food      metoprolol tartrate (Lopressor) 25 mg tablet Take 1 tablet (25 mg) by mouth 2 times a day. Take with meals 200 tablet 3    nitroglycerin (Nitrostat) 0.4 mg SL tablet Place 1 tablet (0.4 mg) under the tongue every 5 minutes if needed for chest pain (UP TO 3 DOSES AS NEEDED FOR CHEST PAIN.CALL 911 IF PAIN PERSISTS.).      omeprazole (PriLOSEC) 40 mg DR capsule Take 1 capsule (40 mg) by mouth 2 times a day.      sucralfate (Carafate) 1 gram tablet Take 1 tablet (1 g) by mouth 4 times a day.       No current facility-administered medications on file prior to visit.        Allergies:   Allergies   Allergen Reactions    Aspirin GI bleeding    Codeine Other     syncope    Hydrocortisone Itching       SH:    Social Drivers of Health     Tobacco Use: Medium Risk (9/10/2024)    Patient History     Smoking Tobacco Use: Former     Smokeless Tobacco Use: Never     Passive Exposure: Past    Alcohol Use: Not At Risk (7/4/2024)    AUDIT-C     Frequency of Alcohol Consumption: Never     Average Number of Drinks: Patient does not drink     Frequency of Binge Drinking: Never   Financial Resource Strain: Low Risk  (7/10/2024)    Overall Financial Resource Strain (CARDIA)     Difficulty of Paying Living Expenses: Not hard at all   Food Insecurity: Not on file   Transportation Needs: No Transportation Needs (7/30/2024)    OASIS : Transportation     Lack of Transportation (Medical): No     Lack of Transportation (Non-Medical): No     Patient Unable or Declines to Respond: No   Physical Activity: Not on file   Stress: Not on file   Social Connections: Feeling Socially Integrated (7/30/2024)    OASIS : Social Isolation     Frequency of experiencing loneliness or isolation: Never   Intimate Partner Violence: Not on file   Depression: Not at risk (7/31/2024)    PHQ-2     PHQ-2 Score: 0   Housing Stability: Low Risk  (7/10/2024)    Housing Stability Vital Sign     Unable to Pay for Housing in the Last Year: No     Number of Times Moved in the Last Year: 1     Homeless in the Last Year: No   Utilities: Not on file   Digital Equity: Not on file   Health Literacy: Adequate Health Literacy (7/30/2024)    OASIS : Health Literacy     Frequency of needing help to read materials from doctor or pharmacy: Never        FH:  Family History   Problem Relation Name Age of Onset    Hyperlipidemia Mother      Hyperlipidemia Father            Objective:  Vitals:  There were no vitals filed for this visit.     Exam:  Gen: in NAD  GI: Soft, ND/NT  Ext:  BLE with 5/5 motor str.    Imaging:  Aortic duplex (11/18/2024) independently reviewed.   Patent endograft.   No signs of endoleak.   AAA sac approx. 2.3 cm.     Aortic duplex (9/18/2023):  Patent endograft.   No signs of endoleak.      CTA abd/pelv (10/16/2022):  Patent endograft  No signs of endoleak     Assessment & Plan:  An 89 year old female s/p EVAR for aortic  stenosis.    - Doing well. Cont. ASA therapy.    - Follow up in 1 year with aortic duplex for surveillance.      Heladio Higginbotham MD, PhD  Clinical   [employer]  Co-Director, Aortic Center  [facility] [place]

## 2024-12-04 ENCOUNTER — APPOINTMENT (OUTPATIENT)
Dept: ORTHOPEDIC SURGERY | Facility: CLINIC | Age: 89
End: 2024-12-04
Payer: MEDICARE

## 2025-01-14 ENCOUNTER — APPOINTMENT (OUTPATIENT)
Dept: CARDIOLOGY | Facility: CLINIC | Age: OVER 89
End: 2025-01-14
Payer: MEDICARE

## 2025-01-14 DIAGNOSIS — I50.22 CHRONIC SYSTOLIC CONGESTIVE HEART FAILURE: Primary | ICD-10-CM

## 2025-01-22 ENCOUNTER — APPOINTMENT (OUTPATIENT)
Dept: ORTHOPEDIC SURGERY | Facility: CLINIC | Age: OVER 89
End: 2025-01-22
Payer: MEDICARE

## 2025-01-28 ENCOUNTER — OFFICE VISIT (OUTPATIENT)
Dept: CARDIOLOGY | Facility: CLINIC | Age: OVER 89
End: 2025-01-28
Payer: MEDICARE

## 2025-01-28 ENCOUNTER — ANCILLARY PROCEDURE (OUTPATIENT)
Dept: CARDIOLOGY | Facility: CLINIC | Age: OVER 89
End: 2025-01-28
Payer: MEDICARE

## 2025-01-28 VITALS
HEIGHT: 60 IN | WEIGHT: 90 LBS | BODY MASS INDEX: 17.67 KG/M2 | DIASTOLIC BLOOD PRESSURE: 62 MMHG | OXYGEN SATURATION: 98 % | SYSTOLIC BLOOD PRESSURE: 146 MMHG | HEART RATE: 70 BPM

## 2025-01-28 DIAGNOSIS — I44.2 CHB (COMPLETE HEART BLOCK): ICD-10-CM

## 2025-01-28 DIAGNOSIS — I50.22 CHRONIC SYSTOLIC CONGESTIVE HEART FAILURE: Primary | ICD-10-CM

## 2025-01-28 DIAGNOSIS — I25.10 CORONARY ARTERY DISEASE INVOLVING NATIVE CORONARY ARTERY OF NATIVE HEART WITHOUT ANGINA PECTORIS: ICD-10-CM

## 2025-01-28 DIAGNOSIS — E11.9 DIABETES MELLITUS TYPE 2, NONINSULIN DEPENDENT (MULTI): ICD-10-CM

## 2025-01-28 DIAGNOSIS — I21.4 NON-ST ELEVATION MYOCARDIAL INFARCTION (NSTEMI), SUBSEQUENT EPISODE OF CARE (MULTI): ICD-10-CM

## 2025-01-28 DIAGNOSIS — Z95.0 PACEMAKER: ICD-10-CM

## 2025-01-28 DIAGNOSIS — D50.0 IRON DEFICIENCY ANEMIA DUE TO CHRONIC BLOOD LOSS: ICD-10-CM

## 2025-01-28 LAB — BODY SURFACE AREA: 1.31 M2

## 2025-01-28 PROCEDURE — 93005 ELECTROCARDIOGRAM TRACING: CPT | Mod: 59 | Performed by: INTERNAL MEDICINE

## 2025-01-28 PROCEDURE — 93280 PM DEVICE PROGR EVAL DUAL: CPT

## 2025-01-28 PROCEDURE — 1159F MED LIST DOCD IN RCRD: CPT | Performed by: INTERNAL MEDICINE

## 2025-01-28 PROCEDURE — G2211 COMPLEX E/M VISIT ADD ON: HCPCS | Performed by: INTERNAL MEDICINE

## 2025-01-28 PROCEDURE — 1036F TOBACCO NON-USER: CPT | Performed by: INTERNAL MEDICINE

## 2025-01-28 PROCEDURE — 93010 ELECTROCARDIOGRAM REPORT: CPT | Performed by: INTERNAL MEDICINE

## 2025-01-28 PROCEDURE — 99214 OFFICE O/P EST MOD 30 MIN: CPT | Mod: 25 | Performed by: INTERNAL MEDICINE

## 2025-01-28 PROCEDURE — 1126F AMNT PAIN NOTED NONE PRSNT: CPT | Performed by: INTERNAL MEDICINE

## 2025-01-28 PROCEDURE — 99214 OFFICE O/P EST MOD 30 MIN: CPT | Performed by: INTERNAL MEDICINE

## 2025-01-28 PROCEDURE — 3077F SYST BP >= 140 MM HG: CPT | Performed by: INTERNAL MEDICINE

## 2025-01-28 PROCEDURE — 3078F DIAST BP <80 MM HG: CPT | Performed by: INTERNAL MEDICINE

## 2025-01-28 ASSESSMENT — PAIN SCALES - GENERAL: PAINLEVEL_OUTOF10: 0-NO PAIN

## 2025-01-28 NOTE — PROGRESS NOTES
Chief Complaint:   Follow-up     History Of Present Illness:    Maci Catalan is a 89 y.o. female presenting with pertinent medical history including, Hx of Perioperative NSTEMI ( Occuring in setting of EVAR 11/2019, Troponin Peak 18.45 ) three-vessel coronary artery disease S/P PCI (11/22/2019) to proximal LAD with a 2.75 x 12 mm Resolute [place] drug-eluting stent postdilated up to 3.5 mm ) Chronic Systolic Heart Failure with LVEF 35% ( 11/2019 ) with Mid and apical anterior septum, mid and apical inferior septum, and basal and mid inferior wall motion abnormalities, HX of AAA S/P EVAR 11/2019 ) Hx of Complete Heart Block S/P Medtronic Dual Chamber System, and Hx of Pancreatic Tumor S/P Whipple who is seen for follow up.     She is seen in the presence of her Sister and permission is granted to discuss medical care in her in her presence.  Since her last appointment, they have both done very well.  She continues to remain as active as she can.  She has put on some weight, but this is mostly desirable weight gain issues improve nutrition.  She has not noticed any lower extremity edema, abdominal swelling.  She notes no orthopnea, paroxysmal nocturnal dyspnea.  She remains compliant with medications.  She offers no cardiovascular complaints.    9/10/2024 -- She returns for follow up, and notes that she suffered a fall and resultatn Hip Fracture. She was placed on oral apixaban for DVT prophylaxis during her recovery.  She did not have any bleeding or bruising complications.  She has not had any falls.  She unfortunately however continues to lose weight.  Her sister feels that this is more related to poor appetite, and early satiety.  Ms. Amairani Catalan however is very surprised to see the degree of weight loss she has experienced.      1/28/2025 -- She returns for follow up.  She again is accompanied in presence of her sister.  Permission screening discussed medical care in her sister's presence.  Since she was  last seen, she overall feels that she is doing well.  She has not suffered any further falls and she has recovered appropriately following her hip fracture.  She has not had significant weight loss nor has she had significant weight gain.  She denies any orthopnea, paroxysmal nocturnal dyspnea.  She has not had any chest heaviness, tightness or pressure nor pain.  She however unfortunately does not recall the last time that her device was checked.    Patient denies chest pain and angina.  Pt denies shortness of breath, dyspnea on exertion, orthopnea, and paroxysmal nocturnal dyspnea.  Pt denies worsening lower extremity edema.  Pt denies palpitations or syncope.  No recent falls.  No fever or chills.  No cough.  No change in bowel or bladder habits.  No travel.  No sick contacts.  No recent travel    12 point review of systems was performed and is otherwise negative.  Past medical history:  As above.  Medications:  Reviewed.  Allergies:  Reviewed.  Social history:  Patient denies smoking, alcohol abuse, or illicit drug use.  Family history:  No sudden cardiac death or premature coronary artery disease.  67     Last Recorded Vitals:  Vitals:    01/28/25 1119   BP: 146/62   BP Location: Left arm   Patient Position: Sitting   Pulse: 70   SpO2: 98%   Weight: (!) 40.8 kg (90 lb)   Height: 1.524 m (5')       Weight         1/28/2025  1119             Weight: 40.8 kg (90 lb)              Past Medical History:  She has a past medical history of Personal history of other diseases of the circulatory system and Personal history of other endocrine, nutritional and metabolic disease.    Past Surgical History:  She has a past surgical history that includes Hysterectomy (04/17/2018); Other surgical history (06/19/2018); Cholecystectomy (06/19/2018); Other surgical history (06/21/2019); Other surgical history (06/21/2019); CT angio abdomen pelvis w and or wo IV IV contrast (8/16/2019); CT angio aorta and bilateral iliofemoral  runoff including without contrast if performed (11/7/2019); IR angiogram aorta abdomen (11/6/2019); and CT angio abdomen pelvis w and or wo IV IV contrast (10/16/2022).      Social History:  She reports that she quit smoking about 8 years ago. Her smoking use included cigarettes. She has been exposed to tobacco smoke. She has never used smokeless tobacco. She reports that she does not drink alcohol and does not use drugs.    Family History:  Family History   Problem Relation Name Age of Onset    Hyperlipidemia Mother      Hyperlipidemia Father          Allergies:  Aspirin, Codeine, and Hydrocortisone    Outpatient Medications:  Current Outpatient Medications   Medication Instructions    acetaminophen (TYLENOL) 650 mg, oral, Every 4 hours PRN    famotidine (PEPCID) 20 mg, Daily    furosemide (LASIX) 40 mg, oral, Daily, Take with Meals    lisinopril 2.5 mg, oral, Daily with lunch    melatonin 10 mg, Nightly    metFORMIN (Glucophage) 500 mg tablet 1 tablet, Every 12 hours    metoprolol tartrate (LOPRESSOR) 25 mg, oral, 2 times daily, Take with meals    nitroglycerin (Nitrostat) 0.4 mg SL tablet 1 tablet, Every 5 min PRN    omeprazole (PriLOSEC) 40 mg DR capsule 1 capsule, 2 times daily (0900,1400)    sucralfate (Carafate) 1 gram tablet 1 tablet, 4 times daily       Physical Exam:  /62 (BP Location: Left arm, Patient Position: Sitting)   Pulse 70   Ht 1.524 m (5')   Wt (!) 40.8 kg (90 lb)   SpO2 98%   BMI 17.58 kg/m²   General:  Patient is awake, alert, and oriented.  Patient is in no acute distress.  HEENT:  Pupils equal and reactive.  Normocephalic.  Moist mucosa.    Neck:  No thyromegaly.  Normal Jugular Venous Pressure.  Cardiovascular:  Regular rate and rhythm.  Normal S1 and S2.  1/6 GLORIA.  Pulmonary:  Clear to auscultation bilaterally.  Abdomen:  Soft. Non-tender.   Non-distended.  Positive bowel sounds.  Lower Extremities:  2+ pedal pulses. No LE edema.  Neurologic:  Cranial nerves intact.  No focal  deficit.   Skin: Skin warm and dry, normal skin turgor.   Psychiatric: Normal affect.             Last Labs:  CBC -  Lab Results   Component Value Date    WBC 5.3 07/11/2024    HGB 7.7 (L) 07/11/2024    HCT 25.2 (L) 07/11/2024    MCV 75 (L) 07/11/2024     07/11/2024       CMP -  Lab Results   Component Value Date    CALCIUM 7.7 (L) 07/11/2024    PHOS 4.1 04/24/2023    PROT 7.1 03/25/2024    ALBUMIN 4.2 03/25/2024    AST 14 03/25/2024    ALT 11 03/25/2024    ALKPHOS 87 03/25/2024    BILITOT 0.3 03/25/2024       LIPID PANEL -   Lab Results   Component Value Date    CHOL 235 (H) 03/25/2024    TRIG 235 (H) 03/25/2024    HDL 62.8 03/25/2024    CHHDL 3.7 03/25/2024    LDLF 126 (H) 11/10/2021    VLDL 47 (H) 03/25/2024    NHDL 172 (H) 03/25/2024       RENAL FUNCTION PANEL -   Lab Results   Component Value Date    GLUCOSE 137 (H) 07/11/2024     07/11/2024    K 3.7 07/11/2024     07/11/2024    CO2 22 07/11/2024    ANIONGAP 13 07/11/2024    BUN 19 07/11/2024    CREATININE 1.37 (H) 07/11/2024    CALCIUM 7.7 (L) 07/11/2024    PHOS 4.1 04/24/2023    ALBUMIN 4.2 03/25/2024        Lab Results   Component Value Date     (H) 03/25/2024    HGBA1C 8.2 (H) 03/25/2024       Last Cardiology Tests:  ECG:  In Office EKG 10/10/2023  -- AV Pacing   Echo:  Echocardiogram 01/2021  1. The left ventricular systolic function is normal with a 55% estimated ejection fraction.  2. Mid and apical inferior septum and basal inferior segment are abnormal.This is unchanged.  3. Moderately increased left ventricular septal thickness.  4. There is an elevated mean left atrial pressure.  5. Slightly elevated RVSP.  6. The left atrium is abnormally small.  7. The left ventricular cavity size is decreased     Cath:  Cardiac catheterization and PCI 2019  A 4 [place] of [place] pigtail was used to cross the aortic valve for LV pressure  measurement and pullback.     Coronary Angiography:  The coronary circulation is right dominant.     Left Main  Coronary Artery:  The left main coronary artery is a normal caliber vessel. The left main arises  normally from the left coronary sinus of Valsalva and bifurcates into the LAD  and circumflex coronary arteries. The left main coronary artery showed  calcification. The distal left main coronary artery showed 10 to 30% stenosis.     Left Anterior Descending Coronary Artery Distribution:  The left anterior descending coronary artery is a normal caliber vessel. The LAD  arises normally from the left main coronary artery. The LAD demonstrated  calcification. The proximal left anterior descending coronary artery showed 40%  stenosis. An additional lesion, located at the proximal left anterior descending  coronary artery, revealed 90% stenosis. The 1st diagonal branch is a normal  caliber vessel. The proximal 1st diagonal branch revealed 80% stenosis. The 2nd  diagonal branch is a small caliber vessel. The proximal 2nd diagonal branch  showed 70% stenosis.        Valve Findings:  No aortic valve stenosis is visualized.     Coronary Interventions:  Angiography reveals a 90% stenosis of the proximal left anterior descending.  Pre-intervention LIZANDRO flow was 3. Percutaneous coronary intervention was  performed within the proximal left anterior descending. The stenosis was  successfully reduced from 90% to 0%. Post-intervention LIZANDRO flow was 3.  Successful PCI of the proximal LAD with a 2.75 x 12 mm Resolute [place]  drug-eluting stent postdilated up to 3.5 mm.     Coronary Intervention Comments:  An Ik[place] L 3.5 guide was used to engage the left main. A run-through wire was  placed in the distal LAD. A Akira Blue wire was attempted to be placed in the  first diagonal. Due to vessel angulation wire could not be placed there. A 2.0 x  12 mm semi-compliant balloon would not cross the proximal LAD. Subsequently a  guide liner was used to assist in delivering equipment. The lesion was  predilated with a 2.0 x 12 mm semi-compliant balloon  at 8 guillermo. The guide liner  was advanced into the LAD while the balloon was inflated. The stent was then  delivered across the lesion. A 2.75 x 12 mm resolute case drug-eluting stent was  then deployed at 10 guillermo. A 3.25 x 8 mm noncompliant balloon would not cross the  mid stent. Subsequently the [place] Blue wire was placed in the distal LAD to be  used as a delia wire. The proximal portion of the stent was postdilated with a  3.25 x 8 mm noncompliant balloon up to 16 guillermo. A 3.25 x 6 mm semi-compliant  balloon was used to post dilate the distal stent up to 14 guillermo. A 3.5 x 6 mm  noncompliant balloon was then used to post dilate the entire stented up to 16  guillermo. Final angiography revealed LIZANDRO 3 flow with 0% residual stenosis. The  jailed diagonal had LIZANDRO 3 flow with moderate to severe ostial disease.     Coronary Lesion Summary:  Vessel Stenosis Vessel Segment  Left Main 10 to 30% stenosis distal  LAD 40% stenosis proximal  LAD 90% stenosis proximal  1st Diagonal 80% stenosis proximal  2nd Diagonal 70% stenosis proximal      Lab review: I have Chemistry CMP:   Lab Results   Component Value Date    ALBUMIN 4.2 03/25/2024    CALCIUM 7.7 (L) 07/11/2024    CO2 22 07/11/2024    CREATININE 1.37 (H) 07/11/2024    GLUCOSE 137 (H) 07/11/2024    BILITOT 0.3 03/25/2024    PROT 7.1 03/25/2024    ALT 11 03/25/2024    AST 14 03/25/2024    ALKPHOS 87 03/25/2024   , Chemistry BMP   Lab Results   Component Value Date    GLUCOSE 137 (H) 07/11/2024    CALCIUM 7.7 (L) 07/11/2024    CO2 22 07/11/2024    CREATININE 1.37 (H) 07/11/2024   , CBC:  Lab Results   Component Value Date    WBC 5.3 07/11/2024    RBC 3.35 (L) 07/11/2024    HGB 7.7 (L) 07/11/2024    HCT 25.2 (L) 07/11/2024    MCV 75 (L) 07/11/2024    MCH 23.0 (L) 07/11/2024    MCHC 30.6 (L) 07/11/2024    RDW 16.4 (H) 07/11/2024    NRBC 0.4 (H) 07/11/2024   , Coags:   Lab Results   Component Value Date    APTT 29 07/04/2024    FIBRINOGEN 429 (H) 11/08/2019    INR 1.0 07/04/2024   ,  "Lipids:   Lab Results   Component Value Date    CHOL 235 (H) 03/25/2024    HDL 62.8 03/25/2024    LDLCALC 125 (H) 03/25/2024    TRIG 235 (H) 03/25/2024   , Cardiac Enzymes: No results found for: \"CKTOTAL\", \"CKMB\", \"CKMBINDEX\", \"TROPONINT\", and POC Glucose:   Lab Results   Component Value Date    GLUCOSE 137 (H) 07/11/2024     Diagnostic review: I have personally reviewed the result(s) of the EKG and Echocardiogram .   Imaging review: I have  personally reviewed the result(s) Device Interrogation     Assessment/Plan   Problem List Items Addressed This Visit             ICD-10-CM    Chronic systolic congestive heart failure - Primary I50.22    Relevant Orders    ECG 12 lead (Clinic Performed) (Completed)    B-Type Natriuretic Peptide    Transthoracic echo (TTE) complete    CBC and Auto Differential    Comprehensive metabolic panel    Coronary artery disease involving native coronary artery of native heart without angina pectoris I25.10    Relevant Orders    Transthoracic echo (TTE) complete    Troponin I, High Sensitivity    CBC and Auto Differential    Comprehensive metabolic panel    Lipid Panel Non-Fasting    LDL cholesterol, direct    Cardiac device check - In Clinic    Diabetes mellitus type 2, noninsulin dependent (Multi) E11.9    Relevant Orders    Hemoglobin A1C    Iron deficiency anemia D50.9    Relevant Orders    CBC and Auto Differential    Comprehensive metabolic panel    Non-ST elevation myocardial infarction (NSTEMI), subsequent episode of care (Multi) I21.4    Relevant Orders    Transthoracic echo (TTE) complete    Troponin I, High Sensitivity    CBC and Auto Differential    Comprehensive metabolic panel    Cardiac device check - In Clinic    Pacemaker Z95.0    Relevant Orders    Transthoracic echo (TTE) complete    CBC and Auto Differential    Comprehensive metabolic panel    Cardiac device check - In Clinic         Overall, she continues to do well from a cardiovascular standpoint.  She offers no " significant complaints.    Her care has been consolidated with both electrophysiology And cardiology at [facility].  Device check today  Repeat echocardiogram for any complications, decline in ejection fraction  We continue to recommend appropriate diet and have liberal goals in order to facilitate improved appetite for healthy weight gain.       Ruben Sanchez, DO

## 2025-01-29 LAB
ATRIAL RATE: 70 BPM
P OFFSET: 143 MS
P ONSET: 101 MS
PR INTERVAL: 128 MS
Q ONSET: 185 MS
QRS COUNT: 11 BEATS
QRS DURATION: 150 MS
QT INTERVAL: 468 MS
QTC CALCULATION(BAZETT): 505 MS
QTC FREDERICIA: 492 MS
R AXIS: 78 DEGREES
T AXIS: 9 DEGREES
T OFFSET: 419 MS
VENTRICULAR RATE: 70 BPM

## 2025-02-03 ENCOUNTER — HOSPITAL ENCOUNTER (OUTPATIENT)
Dept: CARDIOLOGY | Facility: CLINIC | Age: OVER 89
Discharge: HOME | End: 2025-02-03
Payer: MEDICARE

## 2025-02-03 DIAGNOSIS — I44.2 CHB (COMPLETE HEART BLOCK): ICD-10-CM

## 2025-02-03 PROCEDURE — 93294 REM INTERROG EVL PM/LDLS PM: CPT | Performed by: INTERNAL MEDICINE

## 2025-02-03 PROCEDURE — 93296 REM INTERROG EVL PM/IDS: CPT

## 2025-02-11 ENCOUNTER — ANCILLARY PROCEDURE (OUTPATIENT)
Dept: CARDIOLOGY | Facility: CLINIC | Age: OVER 89
End: 2025-02-11
Payer: MEDICARE

## 2025-02-11 DIAGNOSIS — I25.10 CORONARY ARTERY DISEASE INVOLVING NATIVE CORONARY ARTERY OF NATIVE HEART WITHOUT ANGINA PECTORIS: ICD-10-CM

## 2025-02-11 DIAGNOSIS — I21.4 NON-ST ELEVATION MYOCARDIAL INFARCTION (NSTEMI), SUBSEQUENT EPISODE OF CARE (MULTI): ICD-10-CM

## 2025-02-11 DIAGNOSIS — I50.22 CHRONIC SYSTOLIC CONGESTIVE HEART FAILURE: ICD-10-CM

## 2025-02-11 DIAGNOSIS — Z95.0 PACEMAKER: ICD-10-CM

## 2025-02-11 LAB
AORTIC VALVE PEAK VELOCITY: 0.85 M/S
AV PEAK GRADIENT: 3 MMHG
AVA (PEAK VEL): 2.89 CM2
EJECTION FRACTION APICAL 4 CHAMBER: 41.2
EJECTION FRACTION: 35 %
LEFT ATRIUM VOLUME AREA LENGTH INDEX BSA: 25.9 ML/M2
LEFT VENTRICLE INTERNAL DIMENSION DIASTOLE: 4.16 CM (ref 3.5–6)
LEFT VENTRICULAR OUTFLOW TRACT DIAMETER: 1.82 CM
MITRAL VALVE E/A RATIO: 0.55
RIGHT VENTRICLE FREE WALL PEAK S': 9 CM/S
RIGHT VENTRICLE PEAK SYSTOLIC PRESSURE: 30.1 MMHG
TRICUSPID ANNULAR PLANE SYSTOLIC EXCURSION: 1.9 CM

## 2025-02-11 PROCEDURE — 93306 TTE W/DOPPLER COMPLETE: CPT

## 2025-02-11 PROCEDURE — 93306 TTE W/DOPPLER COMPLETE: CPT | Performed by: INTERNAL MEDICINE

## 2025-02-19 ENCOUNTER — APPOINTMENT (OUTPATIENT)
Dept: ORTHOPEDIC SURGERY | Facility: CLINIC | Age: OVER 89
End: 2025-02-19
Payer: MEDICARE

## 2025-02-25 ENCOUNTER — OFFICE VISIT (OUTPATIENT)
Dept: CARDIOLOGY | Facility: CLINIC | Age: OVER 89
End: 2025-02-25
Payer: MEDICARE

## 2025-02-25 ENCOUNTER — ANCILLARY PROCEDURE (OUTPATIENT)
Dept: CARDIOLOGY | Facility: CLINIC | Age: OVER 89
End: 2025-02-25
Payer: MEDICARE

## 2025-02-25 VITALS
SYSTOLIC BLOOD PRESSURE: 142 MMHG | HEART RATE: 68 BPM | OXYGEN SATURATION: 97 % | WEIGHT: 94 LBS | HEIGHT: 60 IN | BODY MASS INDEX: 18.46 KG/M2 | DIASTOLIC BLOOD PRESSURE: 72 MMHG

## 2025-02-25 DIAGNOSIS — I10 BENIGN ESSENTIAL HTN: ICD-10-CM

## 2025-02-25 DIAGNOSIS — I25.10 3-VESSEL CORONARY ARTERY DISEASE: ICD-10-CM

## 2025-02-25 DIAGNOSIS — I71.43 INFRARENAL ABDOMINAL AORTIC ANEURYSM (AAA) WITHOUT RUPTURE (CMS-HCC): ICD-10-CM

## 2025-02-25 DIAGNOSIS — I50.22 CHRONIC SYSTOLIC CONGESTIVE HEART FAILURE: Primary | ICD-10-CM

## 2025-02-25 PROCEDURE — 1159F MED LIST DOCD IN RCRD: CPT | Performed by: INTERNAL MEDICINE

## 2025-02-25 PROCEDURE — 93005 ELECTROCARDIOGRAM TRACING: CPT

## 2025-02-25 PROCEDURE — G2211 COMPLEX E/M VISIT ADD ON: HCPCS | Performed by: INTERNAL MEDICINE

## 2025-02-25 PROCEDURE — 99215 OFFICE O/P EST HI 40 MIN: CPT | Performed by: INTERNAL MEDICINE

## 2025-02-25 PROCEDURE — 3078F DIAST BP <80 MM HG: CPT | Performed by: INTERNAL MEDICINE

## 2025-02-25 PROCEDURE — 3077F SYST BP >= 140 MM HG: CPT | Performed by: INTERNAL MEDICINE

## 2025-02-25 ASSESSMENT — ENCOUNTER SYMPTOMS: DEPRESSION: 0

## 2025-02-25 NOTE — PROGRESS NOTES
Chief Complaint:   Follow-up and Heart Failure     History Of Present Illness:    Maci Catalan is a 89 y.o. female presenting with pertinent medical history including, Hx of Perioperative NSTEMI ( Occuring in setting of EVAR 11/2019, Troponin Peak 18.45 ) three-vessel coronary artery disease S/P PCI (11/22/2019) to proximal LAD with a 2.75 x 12 mm Resolute [place] drug-eluting stent postdilated up to 3.5 mm ) Chronic Systolic Heart Failure with LVEF 35% ( 11/2019 ) with Mid and apical anterior septum, mid and apical inferior septum, and basal and mid inferior wall motion abnormalities, HX of AAA S/P EVAR 11/2019 ) Hx of Complete Heart Block S/P Medtronic Dual Chamber System, and Hx of Pancreatic Tumor S/P [facility] who is seen for follow up.     She is seen in the presence of her Sister and permission is granted to discuss medical care in her in her presence.  Since her last appointment, they have both done very well.  She continues to remain as active as she can.  She has put on some weight, but this is mostly desirable weight gain issues improve nutrition.  She has not noticed any lower extremity edema, abdominal swelling.  She notes no orthopnea, paroxysmal nocturnal dyspnea.  She remains compliant with medications.  She offers no cardiovascular complaints.    9/10/2024 -- She returns for follow up, and notes that she suffered a fall and resultatn Hip Fracture. She was placed on oral apixaban for DVT prophylaxis during her recovery.  She did not have any bleeding or bruising complications.  She has not had any falls.  She unfortunately however continues to lose weight.  Her sister feels that this is more related to poor appetite, and early satiety.  Ms. Amairani Catalan however is very surprised to see the degree of weight loss she has experienced.      1/28/2025 -- She returns for follow up.  She again is accompanied in presence of her sister.  Permission screening discussed medical care in her sister's presence.   Since she was last seen, she overall feels that she is doing well.  She has not suffered any further falls and she has recovered appropriately following her hip fracture.  She has not had significant weight loss nor has she had significant weight gain.  She denies any orthopnea, paroxysmal nocturnal dyspnea.  She has not had any chest heaviness, tightness or pressure nor pain.  She however unfortunately does not recall the last time that her device was checked.    2/25/2025 --she returns for follow-up and is again seen in the presence of her sister.  Since she was last seen, she has put on some weight.  She has adjusted some of her eating habits, and is eating more.  She has not had any shortness of breath, dyspnea on exertion or heart palpitations.  We were able to obtain an echocardiogram from her last appointment which unfortunately showed a reduction in the ejection fraction.      Patient denies chest pain and angina.  Pt denies shortness of breath, dyspnea on exertion, orthopnea, and paroxysmal nocturnal dyspnea.  Pt denies worsening lower extremity edema.  Pt denies palpitations or syncope.  No recent falls.  No fever or chills.  No cough.  No change in bowel or bladder habits.  No travel.  No sick contacts.  No recent travel    12 point review of systems was performed and is otherwise negative.  Past medical history:  As above.  Medications:  Reviewed.  Allergies:  Reviewed.  Social history:  Patient denies smoking, alcohol abuse, or illicit drug use.  Family history:  No sudden cardiac death or premature coronary artery disease.  67     Last Recorded Vitals:  Vitals:    02/25/25 1025   BP: 142/72   BP Location: Right arm   Patient Position: Sitting   BP Cuff Size: Small adult   Pulse: 68   SpO2: 97%   Weight: (!) 42.6 kg (94 lb)   Height: 1.524 m (5')       Weight         2/25/2025  1025             Weight: 42.6 kg (94 lb)              Past Medical History:  She has a past medical history of Personal history  of other diseases of the circulatory system and Personal history of other endocrine, nutritional and metabolic disease.    Past Surgical History:  She has a past surgical history that includes Hysterectomy (04/17/2018); Other surgical history (06/19/2018); Cholecystectomy (06/19/2018); Other surgical history (06/21/2019); Other surgical history (06/21/2019); CT angio abdomen pelvis w and or wo IV IV contrast (8/16/2019); CT angio aorta and bilateral iliofemoral runoff including without contrast if performed (11/7/2019); IR angiogram aorta abdomen (11/6/2019); and CT angio abdomen pelvis w and or wo IV IV contrast (10/16/2022).      Social History:  She reports that she quit smoking about 8 years ago. Her smoking use included cigarettes. She has been exposed to tobacco smoke. She has never used smokeless tobacco. She reports that she does not drink alcohol and does not use drugs.    Family History:  Family History   Problem Relation Name Age of Onset    Hyperlipidemia Mother      Hyperlipidemia Father          Allergies:  Aspirin, Codeine, and Hydrocortisone    Outpatient Medications:  Current Outpatient Medications   Medication Instructions    acetaminophen (TYLENOL) 650 mg, oral, Every 4 hours PRN    empagliflozin (JARDIANCE) 10 mg, oral, Daily    famotidine (PEPCID) 20 mg, Daily    furosemide (LASIX) 40 mg, oral, Daily, Take with Meals    lisinopril 2.5 mg, oral, Daily with lunch    losartan (COZAAR) 25 mg, oral, Daily    melatonin 10 mg, Nightly    metFORMIN (Glucophage) 500 mg tablet 1 tablet, Every 12 hours    metoprolol succinate XL (TOPROL-XL) 25 mg, oral, Daily, Do not crush or chew.    metoprolol tartrate (LOPRESSOR) 25 mg, oral, 2 times daily, Take with meals    nitroglycerin (Nitrostat) 0.4 mg SL tablet 1 tablet, Every 5 min PRN    omeprazole (PriLOSEC) 40 mg DR capsule 1 capsule, 2 times daily (0900,1400)    sucralfate (Carafate) 1 gram tablet 1 tablet, 4 times daily       Physical Exam:  /72 (BP  Location: Right arm, Patient Position: Sitting, BP Cuff Size: Small adult)   Pulse 68   Ht 1.524 m (5')   Wt (!) 42.6 kg (94 lb)   SpO2 97%   BMI 18.36 kg/m²   General:  Patient is awake, alert, and oriented.  Patient is in no acute distress.  HEENT:  Pupils equal and reactive.  Normocephalic.  Moist mucosa.    Neck:  No thyromegaly.  Normal Jugular Venous Pressure.  Cardiovascular:  Regular rate and rhythm.  Normal S1 and S2.  1/6 GLORIA.  Pulmonary:  Clear to auscultation bilaterally.  Abdomen:  Soft. Non-tender.   Non-distended.  Positive bowel sounds.  Lower Extremities:  2+ pedal pulses. No LE edema.  Neurologic:  Cranial nerves intact.  No focal deficit.   Skin: Skin warm and dry, normal skin turgor.   Psychiatric: Normal affect.             Last Labs:  CBC -  Lab Results   Component Value Date    WBC 5.3 07/11/2024    HGB 7.7 (L) 07/11/2024    HCT 25.2 (L) 07/11/2024    MCV 75 (L) 07/11/2024     07/11/2024       CMP -  Lab Results   Component Value Date    CALCIUM 7.7 (L) 07/11/2024    PHOS 4.1 04/24/2023    PROT 7.1 03/25/2024    ALBUMIN 4.2 03/25/2024    AST 14 03/25/2024    ALT 11 03/25/2024    ALKPHOS 87 03/25/2024    BILITOT 0.3 03/25/2024       LIPID PANEL -   Lab Results   Component Value Date    CHOL 235 (H) 03/25/2024    TRIG 235 (H) 03/25/2024    HDL 62.8 03/25/2024    CHHDL 3.7 03/25/2024    LDLF 126 (H) 11/10/2021    VLDL 47 (H) 03/25/2024    NHDL 172 (H) 03/25/2024       RENAL FUNCTION PANEL -   Lab Results   Component Value Date    GLUCOSE 137 (H) 07/11/2024     07/11/2024    K 3.7 07/11/2024     07/11/2024    CO2 22 07/11/2024    ANIONGAP 13 07/11/2024    BUN 19 07/11/2024    CREATININE 1.37 (H) 07/11/2024    CALCIUM 7.7 (L) 07/11/2024    PHOS 4.1 04/24/2023    ALBUMIN 4.2 03/25/2024        Lab Results   Component Value Date     (H) 03/25/2024    HGBA1C 8.2 (H) 03/25/2024       Last Cardiology Tests:  ECG:  In Office EKG 10/10/2023  -- AV Pacing            Echo:  02/2025   1. Left ventricular ejection fraction is moderately decreased, calculated by Spaulding's biplane at 35%.   2. There is global hypokinesis of the left ventricle with minor regional variations.   3. Spectral Doppler shows a Grade I (impaired relaxation pattern) of left ventricular diastolic filling with an elevated left atrial pressure.   4. There is normal right ventricular global systolic function.   5. Right ventricular systolic pressure is within normal limits.      Echocardiogram 01/2021  1. The left ventricular systolic function is normal with a 55% estimated ejection fraction.  2. Mid and apical inferior septum and basal inferior segment are abnormal.This is unchanged.  3. Moderately increased left ventricular septal thickness.  4. There is an elevated mean left atrial pressure.  5. Slightly elevated RVSP.  6. The left atrium is abnormally small.  7. The left ventricular cavity size is decreased     Cath:  Cardiac catheterization and PCI 2019  A 4 Maltese pigtail was used to cross the aortic valve for LV pressure  measurement and pullback.     Coronary Angiography:  The coronary circulation is right dominant.     Left Main Coronary Artery:  The left main coronary artery is a normal caliber vessel. The left main arises  normally from the left coronary sinus of Valsalva and bifurcates into the LAD  and circumflex coronary arteries. The left main coronary artery showed  calcification. The distal left main coronary artery showed 10 to 30% stenosis.     Left Anterior Descending Coronary Artery Distribution:  The left anterior descending coronary artery is a normal caliber vessel. The LAD  arises normally from the left main coronary artery. The LAD demonstrated  calcification. The proximal left anterior descending coronary artery showed 40%  stenosis. An additional lesion, located at the proximal left anterior descending  coronary artery, revealed 90% stenosis. The 1st diagonal branch is a  normal  caliber vessel. The proximal 1st diagonal branch revealed 80% stenosis. The 2nd  diagonal branch is a small caliber vessel. The proximal 2nd diagonal branch  showed 70% stenosis.        Valve Findings:  No aortic valve stenosis is visualized.     Coronary Interventions:  Angiography reveals a 90% stenosis of the proximal left anterior descending.  Pre-intervention LIZANDRO flow was 3. Percutaneous coronary intervention was  performed within the proximal left anterior descending. The stenosis was  successfully reduced from 90% to 0%. Post-intervention LIZANDRO flow was 3.  Successful PCI of the proximal LAD with a 2.75 x 12 mm Resolute [place]  drug-eluting stent postdilated up to 3.5 mm.     Coronary Intervention Comments:  An Ikari L 3.5 guide was used to engage the left main. A run-through wire was  placed in the distal LAD. A Akira Blue wire was attempted to be placed in the  first diagonal. Due to vessel angulation wire could not be placed there. A 2.0 x  12 mm semi-compliant balloon would not cross the proximal LAD. Subsequently a  guide liner was used to assist in delivering equipment. The lesion was  predilated with a 2.0 x 12 mm semi-compliant balloon at 8 guillermo. The guide liner  was advanced into the LAD while the balloon was inflated. The stent was then  delivered across the lesion. A 2.75 x 12 mm resolute [place] drug-eluting stent was  then deployed at 10 guillermo. A 3.25 x 8 mm noncompliant balloon would not cross the  mid stent. Subsequently the Akira Blue wire was placed in the distal LAD to be  used as a delia wire. The proximal portion of the stent was postdilated with a  3.25 x 8 mm noncompliant balloon up to 16 guillermo. A 3.25 x 6 mm semi-compliant  balloon was used to post dilate the distal stent up to 14 guillermo. A 3.5 x 6 mm  noncompliant balloon was then used to post dilate the entire stented up to 16  guillermo. Final angiography revealed LIZANDRO 3 flow with 0% residual stenosis. The  jailed diagonal had LIZANDRO 3 flow with  "moderate to severe ostial disease.     Coronary Lesion Summary:  Vessel Stenosis Vessel Segment  Left Main 10 to 30% stenosis distal  LAD 40% stenosis proximal  LAD 90% stenosis proximal  1st Diagonal 80% stenosis proximal  2nd Diagonal 70% stenosis proximal      Lab review: I have Chemistry CMP:   Lab Results   Component Value Date    ALBUMIN 4.2 03/25/2024    CALCIUM 7.7 (L) 07/11/2024    CO2 22 07/11/2024    CREATININE 1.37 (H) 07/11/2024    GLUCOSE 137 (H) 07/11/2024    BILITOT 0.3 03/25/2024    PROT 7.1 03/25/2024    ALT 11 03/25/2024    AST 14 03/25/2024    ALKPHOS 87 03/25/2024   , Chemistry BMP   Lab Results   Component Value Date    GLUCOSE 137 (H) 07/11/2024    CALCIUM 7.7 (L) 07/11/2024    CO2 22 07/11/2024    CREATININE 1.37 (H) 07/11/2024   , CBC:  Lab Results   Component Value Date    WBC 5.3 07/11/2024    RBC 3.35 (L) 07/11/2024    HGB 7.7 (L) 07/11/2024    HCT 25.2 (L) 07/11/2024    MCV 75 (L) 07/11/2024    MCH 23.0 (L) 07/11/2024    MCHC 30.6 (L) 07/11/2024    RDW 16.4 (H) 07/11/2024    NRBC 0.4 (H) 07/11/2024   , Coags:   Lab Results   Component Value Date    APTT 29 07/04/2024    FIBRINOGEN 429 (H) 11/08/2019    INR 1.0 07/04/2024   , Lipids:   Lab Results   Component Value Date    CHOL 235 (H) 03/25/2024    HDL 62.8 03/25/2024    LDLCALC 125 (H) 03/25/2024    TRIG 235 (H) 03/25/2024   , Cardiac Enzymes: No results found for: \"CKTOTAL\", \"CKMB\", \"CKMBINDEX\", \"TROPONINT\", and POC Glucose:   Lab Results   Component Value Date    GLUCOSE 137 (H) 07/11/2024     Diagnostic review: I have personally reviewed the result(s) of the EKG and Echocardiogram .   Imaging review: I have  personally reviewed the result(s) Device Interrogation     Assessment/Plan   Problem List Items Addressed This Visit       3-vessel coronary artery disease    Overview     three-vessel coronary artery disease S/P PCI (11/22/2019) to proximal LAD with a 2.75 x 12 mm Resolute [place] drug-eluting stent postdilated up to 3.5 mm       "    Relevant Medications    metoprolol succinate XL (Toprol-XL) 25 mg 24 hr tablet    Abdominal aortic aneurysm (AAA) (CMS-HCC)    Benign essential HTN    Chronic systolic congestive heart failure - Primary    Overview      Chronic Systolic Heart Failure -- appears to be stable based on her history provided  --  LVEF 35% ( 11/2019 ) with Mid and apical anterior septum, mid and apical inferior septum, and basal and mid inferior wall motion abnormalities.   -- LVEF  55% ( 1/2021) with mid and apical inferior septum and basal inferior segment are hypokinetic.   -- We will stop her Amlodipine 10 mg.   -- We will stop her Lisinopril 2.5 mg.  Will introduce losartan 25 mg daily, if needed, reduced to 12.5 mg at 1/2 tablet daily  -- Continue Metoprolol succinate 5 mg daily  --Jardiance 10 mg daily  -- Continue Furosemide at 40 mg Daily for now with Supplemental Potassium          Relevant Medications    losartan (Cozaar) 25 mg tablet    metoprolol succinate XL (Toprol-XL) 25 mg 24 hr tablet    empagliflozin (Jardiance) 10 mg    Other Relevant Orders    ECG 12 lead (Clinic Performed)     Unfortunately, her ejection fraction has declined.  -- Possibly related to pacemaker dependence, although also has a history of known ischemic disease with revascularization.  Given her advanced age, as well as small stature, invasive strategy may cause more harm than benefit and further evaluation.  For now, focus on medication up titration      Ruben Sanchez DO

## 2025-02-25 NOTE — PATIENT INSTRUCTIONS
It was a pleasure seeing you today. I would like to see you back in clinic in  4-6  months. You can call my office if questions arise between now and our next visit.     Today we talked about the following:     -- WE NEED TO ADJUST YOUR MEDICATIONS TO HELP YOUR HEART FUNCTION. We are doing this because Weaker hearts do better on specific medications.     ++ PLEASE STOP LISINOPRIL 2.5 mg.. You need at least 3 days off this medication before you begin LOSARTAN    ++ Please Begin Losartan 25 mg Daily to help your heart and lower blood pressure     ++ Please stop Metoprolol Tartrate 25 mg Twice Daily     ++ Please begin Metoprolol Succinate 25 mg Daily ( lasts longer )     ++ Please begin Jardiance 10 mg as well to help your heart                 -- Cholesterol -- Watch what you are eating and try to exercise just a touc more             We will stay on your current medications     Below are some Heart Health Tips that we provide to all of our patients. I hope you find them useful.     -  We recommend you follow a heart healthy diet. Watch food labels and try not to eat more than 2,500 mg of sodium per day. Avoid foods high in salt like processed meats (lunch meats, elias, and sausage), processed foods (boxed dinners, canned soups), fried and fast foods. Monitor serving sizes and if the sodium per serving size is more than 200 mg, avoid those foods. If the sodium per serving size is between 100-200 mg, you can use those in limited quantities. Try to choose foods where the amount of sodium per serving size is less than 100 mg. Try to eat a diet rich in fruits and vegetables, whole grains, low fat dairy products, skinless poultry and fish, nuts, beans, non-tropical vegetable oils. Limit saturated fat, trans fat, sodium, red meats, and sugar-sweetened beverages.   Limit alcohol     -The combination of a reduced-calorie diet and increased physical activity is recommended. Adults should aim to get at least 150 minutes of  "moderate physical activity per week (30 minutes of moderate physical activities at least 5 days per week). Examples of moderate physical activities include brisk walking, swimming, aerobic dancing, heavy gardening, jumping rope, bicycling 10 MPH or faster, tennis, hiking uphill or with a heavy backpack. Please let us know if you would like to learn more about your nutrition and calories and additional options including weight loss programs to help you reach your goal.     -If you smoke, stop smoking. If you stop smoking you can help get rid of a major source of stress to your heart. Smoking makes your heart rate and blood pressure go up and increases your risk or developing cardiovascular diseases and worsen symptoms associated with heart failure.     -Obtain a BP monitor and monitor your BP daily. Check it around the same time each day; at least 1 hour after taking your medications. Record your BP in a log and bring your log with you to your doctors appointment.     -F/u with your PCP as recommended.     - If you are having problems with medications, consider looking at the following websites.   --  \"GoodRx\"   --  \"[facility]\"    - We are happy to supply written prescriptions if needed to allow you to obtain your medications from different pharmacies. Additionally, if you are having issues with mail order delivery, please let us know. We can send a limited supply of your medications to your local pharmacy.   "

## 2025-02-26 RX ORDER — METOPROLOL SUCCINATE 25 MG/1
25 TABLET, EXTENDED RELEASE ORAL DAILY
Qty: 90 TABLET | Refills: 3 | Status: SHIPPED | OUTPATIENT
Start: 2025-02-26 | End: 2026-02-26

## 2025-02-26 RX ORDER — LOSARTAN POTASSIUM 25 MG/1
25 TABLET ORAL DAILY
Qty: 90 TABLET | Refills: 3 | Status: SHIPPED | OUTPATIENT
Start: 2025-02-26 | End: 2026-02-26

## 2025-02-27 DIAGNOSIS — I25.10 3-VESSEL CORONARY ARTERY DISEASE: ICD-10-CM

## 2025-02-27 RX ORDER — METOPROLOL TARTRATE 25 MG/1
25 TABLET, FILM COATED ORAL 2 TIMES DAILY
Qty: 180 TABLET | Refills: 3 | Status: SHIPPED | OUTPATIENT
Start: 2025-02-27

## 2025-03-03 LAB
ATRIAL RATE: 68 BPM
P AXIS: 68 DEGREES
P OFFSET: 154 MS
P ONSET: 105 MS
PR INTERVAL: 134 MS
Q ONSET: 189 MS
QRS COUNT: 12 BEATS
QRS DURATION: 146 MS
QT INTERVAL: 458 MS
QTC CALCULATION(BAZETT): 487 MS
QTC FREDERICIA: 477 MS
R AXIS: 27 DEGREES
T AXIS: 43 DEGREES
T OFFSET: 418 MS
VENTRICULAR RATE: 68 BPM

## 2025-03-04 LAB
ERYTHROCYTE [DISTWIDTH] IN BLOOD BY AUTOMATED COUNT: 15.1 % (ref 11–15)
HCT VFR BLD AUTO: 34.7 % (ref 35–45)
HGB BLD-MCNC: 10.6 G/DL (ref 11.7–15.5)
MCH RBC QN AUTO: 23.7 PG (ref 27–33)
MCHC RBC AUTO-ENTMCNC: 30.5 G/DL (ref 32–36)
MCV RBC AUTO: 77.5 FL (ref 80–100)
PLATELET # BLD AUTO: 223 THOUSAND/UL (ref 140–400)
PMV BLD REES-ECKER: 11.1 FL (ref 7.5–12.5)
RBC # BLD AUTO: 4.48 MILLION/UL (ref 3.8–5.1)
WBC # BLD AUTO: 7.5 THOUSAND/UL (ref 3.8–10.8)

## 2025-03-12 ENCOUNTER — APPOINTMENT (OUTPATIENT)
Dept: RADIOLOGY | Facility: CLINIC | Age: OVER 89
End: 2025-03-12
Payer: MEDICARE

## 2025-03-12 ENCOUNTER — APPOINTMENT (OUTPATIENT)
Dept: ORTHOPEDIC SURGERY | Facility: CLINIC | Age: OVER 89
End: 2025-03-12
Payer: MEDICARE

## 2025-04-08 ENCOUNTER — OFFICE VISIT (OUTPATIENT)
Dept: CARDIOLOGY | Facility: CLINIC | Age: OVER 89
End: 2025-04-08
Payer: MEDICARE

## 2025-04-08 VITALS
RESPIRATION RATE: 16 BRPM | HEART RATE: 70 BPM | BODY MASS INDEX: 18.46 KG/M2 | WEIGHT: 94 LBS | OXYGEN SATURATION: 99 % | SYSTOLIC BLOOD PRESSURE: 151 MMHG | DIASTOLIC BLOOD PRESSURE: 74 MMHG | HEIGHT: 60 IN

## 2025-04-08 DIAGNOSIS — I50.22 CHRONIC SYSTOLIC CONGESTIVE HEART FAILURE: Primary | ICD-10-CM

## 2025-04-08 LAB
ATRIAL RATE: 70 BPM
P AXIS: 67 DEGREES
P OFFSET: 170 MS
P ONSET: 115 MS
PR INTERVAL: 152 MS
Q ONSET: 191 MS
QRS COUNT: 12 BEATS
QRS DURATION: 144 MS
QT INTERVAL: 446 MS
QTC CALCULATION(BAZETT): 481 MS
QTC FREDERICIA: 469 MS
R AXIS: 27 DEGREES
T AXIS: 10 DEGREES
T OFFSET: 414 MS
VENTRICULAR RATE: 70 BPM

## 2025-04-08 PROCEDURE — 1160F RVW MEDS BY RX/DR IN RCRD: CPT | Performed by: INTERNAL MEDICINE

## 2025-04-08 PROCEDURE — 3078F DIAST BP <80 MM HG: CPT | Performed by: INTERNAL MEDICINE

## 2025-04-08 PROCEDURE — 93005 ELECTROCARDIOGRAM TRACING: CPT | Performed by: INTERNAL MEDICINE

## 2025-04-08 PROCEDURE — 99213 OFFICE O/P EST LOW 20 MIN: CPT | Performed by: INTERNAL MEDICINE

## 2025-04-08 PROCEDURE — 3077F SYST BP >= 140 MM HG: CPT | Performed by: INTERNAL MEDICINE

## 2025-04-08 PROCEDURE — 1159F MED LIST DOCD IN RCRD: CPT | Performed by: INTERNAL MEDICINE

## 2025-04-08 PROCEDURE — G2211 COMPLEX E/M VISIT ADD ON: HCPCS | Performed by: INTERNAL MEDICINE

## 2025-04-08 RX ORDER — LOSARTAN POTASSIUM 50 MG/1
50 TABLET ORAL DAILY
Qty: 90 TABLET | Refills: 3 | Status: SHIPPED | OUTPATIENT
Start: 2025-04-08 | End: 2026-04-08

## 2025-04-08 ASSESSMENT — ENCOUNTER SYMPTOMS: DEPRESSION: 0

## 2025-04-08 NOTE — PATIENT INSTRUCTIONS
It was a pleasure seeing you today. I would like to see you back in clinic in  4-6  months. You can call my office if questions arise between now and our next visit.     Today we talked about the following:     -- WE NEED TO ADJUST YOUR MEDICATIONS TO HELP YOUR HEART FUNCTION. We are doing this because Weaker hearts do better on specific medications.     ++ PLEASE STOP LISINOPRIL 2.5 mg.. You need at least 3 days off this medication before you begin LOSARTAN    ++ Please INCREASE Losartan 25 mg Daily  to 50 mg Daily  when the new prescription arrives. to help your heart and lower blood pressure     ++ Please stop Metoprolol Tartrate 25 mg Twice Daily     ++ Please begin Metoprolol Succinate 25 mg Daily ( lasts longer )     ++ Please Continue Jardiance 10 mg as well to help your heart           -- Cholesterol -- Watch what you are eating and try to exercise just a touc more             We will stay on your current medications     Below are some Heart Health Tips that we provide to all of our patients. I hope you find them useful.     -  We recommend you follow a heart healthy diet. Watch food labels and try not to eat more than 2,500 mg of sodium per day. Avoid foods high in salt like processed meats (lunch meats, elias, and sausage), processed foods (boxed dinners, canned soups), fried and fast foods. Monitor serving sizes and if the sodium per serving size is more than 200 mg, avoid those foods. If the sodium per serving size is between 100-200 mg, you can use those in limited quantities. Try to choose foods where the amount of sodium per serving size is less than 100 mg. Try to eat a diet rich in fruits and vegetables, whole grains, low fat dairy products, skinless poultry and fish, nuts, beans, non-tropical vegetable oils. Limit saturated fat, trans fat, sodium, red meats, and sugar-sweetened beverages.   Limit alcohol     -The combination of a reduced-calorie diet and increased physical activity is recommended.  "Adults should aim to get at least 150 minutes of moderate physical activity per week (30 minutes of moderate physical activities at least 5 days per week). Examples of moderate physical activities include brisk walking, swimming, aerobic dancing, heavy gardening, jumping rope, bicycling 10 MPH or faster, tennis, hiking uphill or with a heavy backpack. Please let us know if you would like to learn more about your nutrition and calories and additional options including weight loss programs to help you reach your goal.     -If you smoke, stop smoking. If you stop smoking you can help get rid of a major source of stress to your heart. Smoking makes your heart rate and blood pressure go up and increases your risk or developing cardiovascular diseases and worsen symptoms associated with heart failure.     -Obtain a BP monitor and monitor your BP daily. Check it around the same time each day; at least 1 hour after taking your medications. Record your BP in a log and bring your log with you to your doctors appointment.     -F/u with your PCP as recommended.     - If you are having problems with medications, consider looking at the following websites.   --  \"GoodRx\"   --  \"[facility]\"    - We are happy to supply written prescriptions if needed to allow you to obtain your medications from different pharmacies. Additionally, if you are having issues with mail order delivery, please let us know. We can send a limited supply of your medications to your local pharmacy.   "

## 2025-04-08 NOTE — PROGRESS NOTES
Chief Complaint:   Follow-up and Heart Failure     History Of Present Illness:    Maci Catalan is a 89 y.o. female presenting with pertinent medical history including, Hx of Perioperative NSTEMI ( Occuring in setting of EVAR 11/2019, Troponin Peak 18.45 ) three-vessel coronary artery disease S/P PCI (11/22/2019) to proximal LAD with a 2.75 x 12 mm Resolute [place] drug-eluting stent postdilated up to 3.5 mm ) Chronic Systolic Heart Failure with LVEF 35% ( 11/2019 ) with Mid and apical anterior septum, mid and apical inferior septum, and basal and mid inferior wall motion abnormalities, HX of AAA S/P EVAR 11/2019 ) Hx of Complete Heart Block S/P Medtronic Dual Chamber System, and Hx of Pancreatic Tumor S/P [facility]ipple who is seen for follow up.     She is seen in the presence of her Sister and permission is granted to discuss medical care in her in her presence.  Since her last appointment, they have both done very well.  She continues to remain as active as she can.  She has put on some weight, but this is mostly desirable weight gain issues improve nutrition.  She has not noticed any lower extremity edema, abdominal swelling.  She notes no orthopnea, paroxysmal nocturnal dyspnea.  She remains compliant with medications.  She offers no cardiovascular complaints.    9/10/2024 -- She returns for follow up, and notes that she suffered a fall and resultatn Hip Fracture. She was placed on oral apixaban for DVT prophylaxis during her recovery.  She did not have any bleeding or bruising complications.  She has not had any falls.  She unfortunately however continues to lose weight.  Her sister feels that this is more related to poor appetite, and early satiety.  Ms. Amairani Catalan however is very surprised to see the degree of weight loss she has experienced.      1/28/2025 -- She returns for follow up.  She again is accompanied in presence of her sister.  Permission screening discussed medical care in her sister's presence.   Since she was last seen, she overall feels that she is doing well.  She has not suffered any further falls and she has recovered appropriately following her hip fracture.  She has not had significant weight loss nor has she had significant weight gain.  She denies any orthopnea, paroxysmal nocturnal dyspnea.  She has not had any chest heaviness, tightness or pressure nor pain.  She however unfortunately does not recall the last time that her device was checked.    2/25/2025 --she returns for follow-up and is again seen in the presence of her sister.  Since she was last seen, she has put on some weight.  She has adjusted some of her eating habits, and is eating more.  She has not had any shortness of breath, dyspnea on exertion or heart palpitations.  We were able to obtain an echocardiogram from her last appointment which unfortunately showed a reduction in the ejection fraction.      Patient denies chest pain and angina.  Pt denies shortness of breath, dyspnea on exertion, orthopnea, and paroxysmal nocturnal dyspnea.  Pt denies worsening lower extremity edema.  Pt denies palpitations or syncope.  No recent falls.  No fever or chills.  No cough.  No change in bowel or bladder habits.  No travel.  No sick contacts.  No recent travel    12 point review of systems was performed and is otherwise negative.  Past medical history:  As above.  Medications:  Reviewed.  Allergies:  Reviewed.  Social history:  Patient denies smoking, alcohol abuse, or illicit drug use.  Family history:  No sudden cardiac death or premature coronary artery disease.  67     Last Recorded Vitals:  Vitals:    04/08/25 1116   BP: 151/74   BP Location: Right arm   Patient Position: Sitting   BP Cuff Size: Small adult   Pulse: 70   Resp: 16   SpO2: 99%   Weight: (!) 42.6 kg (94 lb)   Height: 1.524 m (5')       Weight         4/8/2025  1116             Weight: 42.6 kg (94 lb)              Past Medical History:  She has a past medical history of  Personal history of other diseases of the circulatory system and Personal history of other endocrine, nutritional and metabolic disease.    Past Surgical History:  She has a past surgical history that includes Hysterectomy (04/17/2018); Other surgical history (06/19/2018); Cholecystectomy (06/19/2018); Other surgical history (06/21/2019); Other surgical history (06/21/2019); CT angio abdomen pelvis w and or wo IV IV contrast (8/16/2019); CT angio aorta and bilateral iliofemoral runoff including without contrast if performed (11/7/2019); IR angiogram aorta abdomen (11/6/2019); and CT angio abdomen pelvis w and or wo IV IV contrast (10/16/2022).      Social History:  She reports that she quit smoking about 8 years ago. Her smoking use included cigarettes. She has been exposed to tobacco smoke. She has never used smokeless tobacco. She reports that she does not drink alcohol and does not use drugs.    Family History:  Family History   Problem Relation Name Age of Onset    Hyperlipidemia Mother      Hyperlipidemia Father          Allergies:  Aspirin, Codeine, and Hydrocortisone    Outpatient Medications:  Current Outpatient Medications   Medication Instructions    acetaminophen (TYLENOL) 650 mg, oral, Every 4 hours PRN    empagliflozin (JARDIANCE) 10 mg, oral, Daily    famotidine (PEPCID) 20 mg, Daily    furosemide (LASIX) 40 mg, oral, Daily, Take with Meals    lisinopril 2.5 mg, oral, Daily with lunch    losartan (COZAAR) 25 mg, oral, Daily    melatonin 10 mg, Nightly    metFORMIN (Glucophage) 500 mg tablet 1 tablet, Every 12 hours    metoprolol succinate XL (TOPROL-XL) 25 mg, oral, Daily, Do not crush or chew.    metoprolol tartrate (LOPRESSOR) 25 mg, oral, 2 times daily    nitroglycerin (Nitrostat) 0.4 mg SL tablet 1 tablet, Every 5 min PRN    omeprazole (PriLOSEC) 40 mg DR capsule 1 capsule, 2 times daily (0900,1400)    sucralfate (Carafate) 1 gram tablet 1 tablet, 4 times daily       Physical Exam:  /74 (BP  Location: Right arm, Patient Position: Sitting, BP Cuff Size: Small adult)   Pulse 70   Resp 16   Ht 1.524 m (5')   Wt (!) 42.6 kg (94 lb)   SpO2 99%   BMI 18.36 kg/m²   General:  Patient is awake, alert, and oriented.  Patient is in no acute distress.  HEENT:  Pupils equal and reactive.  Normocephalic.  Moist mucosa.    Neck:  No thyromegaly.  Normal Jugular Venous Pressure.  Cardiovascular:  Regular rate and rhythm.  Normal S1 and S2.  1/6 GLORIA.  Pulmonary:  Clear to auscultation bilaterally.  Abdomen:  Soft. Non-tender.   Non-distended.  Positive bowel sounds.  Lower Extremities:  2+ pedal pulses. No LE edema.  Neurologic:  Cranial nerves intact.  No focal deficit.   Skin: Skin warm and dry, normal skin turgor.   Psychiatric: Normal affect.             Last Labs:  CBC -  Lab Results   Component Value Date    WBC 7.5 03/03/2025    HGB 10.6 (L) 03/03/2025    HCT 34.7 (L) 03/03/2025    MCV 77.5 (L) 03/03/2025     03/03/2025       CMP -  Lab Results   Component Value Date    CALCIUM 7.7 (L) 07/11/2024    PHOS 4.1 04/24/2023    PROT 7.1 03/25/2024    ALBUMIN 4.2 03/25/2024    AST 14 03/25/2024    ALT 11 03/25/2024    ALKPHOS 87 03/25/2024    BILITOT 0.3 03/25/2024       LIPID PANEL -   Lab Results   Component Value Date    CHOL 235 (H) 03/25/2024    TRIG 235 (H) 03/25/2024    HDL 62.8 03/25/2024    CHHDL 3.7 03/25/2024    LDLF 126 (H) 11/10/2021    VLDL 47 (H) 03/25/2024    NHDL 172 (H) 03/25/2024       RENAL FUNCTION PANEL -   Lab Results   Component Value Date    GLUCOSE 137 (H) 07/11/2024     07/11/2024    K 3.7 07/11/2024     07/11/2024    CO2 22 07/11/2024    ANIONGAP 13 07/11/2024    BUN 19 07/11/2024    CREATININE 1.37 (H) 07/11/2024    CALCIUM 7.7 (L) 07/11/2024    PHOS 4.1 04/24/2023    ALBUMIN 4.2 03/25/2024        Lab Results   Component Value Date     (H) 03/25/2024    HGBA1C 8.2 (H) 03/25/2024       Last Cardiology Tests:  ECG:  In Office EKG 10/10/2023  -- AV Pacing            Echo:  02/2025   1. Left ventricular ejection fraction is moderately decreased, calculated by Spaulding's biplane at 35%.   2. There is global hypokinesis of the left ventricle with minor regional variations.   3. Spectral Doppler shows a Grade I (impaired relaxation pattern) of left ventricular diastolic filling with an elevated left atrial pressure.   4. There is normal right ventricular global systolic function.   5. Right ventricular systolic pressure is within normal limits.      Echocardiogram 01/2021  1. The left ventricular systolic function is normal with a 55% estimated ejection fraction.  2. Mid and apical inferior septum and basal inferior segment are abnormal.This is unchanged.  3. Moderately increased left ventricular septal thickness.  4. There is an elevated mean left atrial pressure.  5. Slightly elevated RVSP.  6. The left atrium is abnormally small.  7. The left ventricular cavity size is decreased     Cath:  Cardiac catheterization and PCI 2019  A 4 Irish pigtail was used to cross the aortic valve for LV pressure  measurement and pullback.     Coronary Angiography:  The coronary circulation is right dominant.     Left Main Coronary Artery:  The left main coronary artery is a normal caliber vessel. The left main arises  normally from the left coronary sinus of Valsalva and bifurcates into the LAD  and circumflex coronary arteries. The left main coronary artery showed  calcification. The distal left main coronary artery showed 10 to 30% stenosis.     Left Anterior Descending Coronary Artery Distribution:  The left anterior descending coronary artery is a normal caliber vessel. The LAD  arises normally from the left main coronary artery. The LAD demonstrated  calcification. The proximal left anterior descending coronary artery showed 40%  stenosis. An additional lesion, located at the proximal left anterior descending  coronary artery, revealed 90% stenosis. The 1st diagonal branch is a  normal  caliber vessel. The proximal 1st diagonal branch revealed 80% stenosis. The 2nd  diagonal branch is a small caliber vessel. The proximal 2nd diagonal branch  showed 70% stenosis.        Valve Findings:  No aortic valve stenosis is visualized.     Coronary Interventions:  Angiography reveals a 90% stenosis of the proximal left anterior descending.  Pre-intervention LIZANDRO flow was 3. Percutaneous coronary intervention was  performed within the proximal left anterior descending. The stenosis was  successfully reduced from 90% to 0%. Post-intervention LIZANDRO flow was 3.  Successful PCI of the proximal LAD with a 2.75 x 12 mm Resolute [place]  drug-eluting stent postdilated up to 3.5 mm.     Coronary Intervention Comments:  An Ikari L 3.5 guide was used to engage the left main. A run-through wire was  placed in the distal LAD. A Akira Blue wire was attempted to be placed in the  first diagonal. Due to vessel angulation wire could not be placed there. A 2.0 x  12 mm semi-compliant balloon would not cross the proximal LAD. Subsequently a  guide liner was used to assist in delivering equipment. The lesion was  predilated with a 2.0 x 12 mm semi-compliant balloon at 8 guillermo. The guide liner  was advanced into the LAD while the balloon was inflated. The stent was then  delivered across the lesion. A 2.75 x 12 mm resolute case drug-eluting stent was  then deployed at 10 guillermo. A 3.25 x 8 mm noncompliant balloon would not cross the  mid stent. Subsequently the Akira Blue wire was placed in the distal LAD to be  used as a delia wire. The proximal portion of the stent was postdilated with a  3.25 x 8 mm noncompliant balloon up to 16 guillermo. A 3.25 x 6 mm semi-compliant  balloon was used to post dilate the distal stent up to 14 guillermo. A 3.5 x 6 mm  noncompliant balloon was then used to post dilate the entire stented up to 16  guillermo. Final angiography revealed LIZANDRO 3 flow with 0% residual stenosis. The  jailed diagonal had LIZANDRO 3 flow with  "moderate to severe ostial disease.     Coronary Lesion Summary:  Vessel Stenosis Vessel Segment  Left Main 10 to 30% stenosis distal  LAD 40% stenosis proximal  LAD 90% stenosis proximal  1st Diagonal 80% stenosis proximal  2nd Diagonal 70% stenosis proximal      Lab review: I have Chemistry CMP:   Lab Results   Component Value Date    ALBUMIN 4.2 03/25/2024    CALCIUM 7.7 (L) 07/11/2024    CO2 22 07/11/2024    CREATININE 1.37 (H) 07/11/2024    GLUCOSE 137 (H) 07/11/2024    BILITOT 0.3 03/25/2024    PROT 7.1 03/25/2024    ALT 11 03/25/2024    AST 14 03/25/2024    ALKPHOS 87 03/25/2024   , Chemistry BMP   Lab Results   Component Value Date    GLUCOSE 137 (H) 07/11/2024    CALCIUM 7.7 (L) 07/11/2024    CO2 22 07/11/2024    CREATININE 1.37 (H) 07/11/2024   , CBC:  Lab Results   Component Value Date    WBC 7.5 03/03/2025    RBC 4.48 03/03/2025    HGB 10.6 (L) 03/03/2025    HCT 34.7 (L) 03/03/2025    MCV 77.5 (L) 03/03/2025    MCH 23.7 (L) 03/03/2025    MCHC 30.5 (L) 03/03/2025    RDW 15.1 (H) 03/03/2025    MPV 11.1 03/03/2025    NRBC 0.4 (H) 07/11/2024   , Coags:   Lab Results   Component Value Date    APTT 29 07/04/2024    FIBRINOGEN 429 (H) 11/08/2019    INR 1.0 07/04/2024   , Lipids:   Lab Results   Component Value Date    CHOL 235 (H) 03/25/2024    HDL 62.8 03/25/2024    LDLCALC 125 (H) 03/25/2024    TRIG 235 (H) 03/25/2024   , Cardiac Enzymes: No results found for: \"CKTOTAL\", \"CKMB\", \"CKMBINDEX\", \"TROPONINT\", and POC Glucose:   Lab Results   Component Value Date    GLUCOSE 137 (H) 07/11/2024     Diagnostic review: I have personally reviewed the result(s) of the EKG and Echocardiogram .   Imaging review: I have  personally reviewed the result(s) Device Interrogation     Assessment/Plan   Problem List Items Addressed This Visit       Chronic systolic congestive heart failure - Primary    Overview      Chronic Systolic Heart Failure -- appears to be stable based on her history provided  --  LVEF 35% ( 11/2019 ) with " Mid and apical anterior septum, mid and apical inferior septum, and basal and mid inferior wall motion abnormalities.   -- LVEF  55% ( 1/2021) with mid and apical inferior septum and basal inferior segment are hypokinetic.   -- We will stop her Amlodipine 10 mg.   -- We will stop her Lisinopril 2.5 mg.  Will introduce losartan 25 mg daily, if needed, reduced to 12.5 mg at 1/2 tablet daily  -- Continue Metoprolol succinate 5 mg daily  --Jardiance 10 mg daily  -- Continue Furosemide at 40 mg Daily for now with Supplemental Potassium           Unfortunately, her ejection fraction has declined.  -- Possibly related to pacemaker dependence, although also has a history of known ischemic disease with revascularization.  Given her advanced age, as well as small stature, invasive strategy may cause more harm than benefit and further evaluation.  For now, focus on medication up titration      Ruben Sanchez,

## 2025-04-16 ENCOUNTER — OFFICE VISIT (OUTPATIENT)
Dept: ORTHOPEDIC SURGERY | Facility: CLINIC | Age: OVER 89
End: 2025-04-16
Payer: MEDICARE

## 2025-04-16 ENCOUNTER — HOSPITAL ENCOUNTER (OUTPATIENT)
Dept: RADIOLOGY | Facility: CLINIC | Age: OVER 89
Discharge: HOME | End: 2025-04-16
Payer: MEDICARE

## 2025-04-16 VITALS — HEIGHT: 60 IN | WEIGHT: 95 LBS | BODY MASS INDEX: 18.65 KG/M2

## 2025-04-16 DIAGNOSIS — S72.001A CLOSED RIGHT HIP FRACTURE, INITIAL ENCOUNTER: ICD-10-CM

## 2025-04-16 PROCEDURE — 1036F TOBACCO NON-USER: CPT | Performed by: STUDENT IN AN ORGANIZED HEALTH CARE EDUCATION/TRAINING PROGRAM

## 2025-04-16 PROCEDURE — 73502 X-RAY EXAM HIP UNI 2-3 VIEWS: CPT | Mod: RT

## 2025-04-16 PROCEDURE — 1159F MED LIST DOCD IN RCRD: CPT | Performed by: STUDENT IN AN ORGANIZED HEALTH CARE EDUCATION/TRAINING PROGRAM

## 2025-04-16 PROCEDURE — 1160F RVW MEDS BY RX/DR IN RCRD: CPT | Performed by: STUDENT IN AN ORGANIZED HEALTH CARE EDUCATION/TRAINING PROGRAM

## 2025-04-16 PROCEDURE — 99213 OFFICE O/P EST LOW 20 MIN: CPT | Performed by: STUDENT IN AN ORGANIZED HEALTH CARE EDUCATION/TRAINING PROGRAM

## 2025-04-16 ASSESSMENT — PAIN - FUNCTIONAL ASSESSMENT: PAIN_FUNCTIONAL_ASSESSMENT: NO/DENIES PAIN

## 2025-04-16 ASSESSMENT — ENCOUNTER SYMPTOMS
LOSS OF SENSATION IN FEET: 0
OCCASIONAL FEELINGS OF UNSTEADINESS: 1

## 2025-04-16 ASSESSMENT — PATIENT HEALTH QUESTIONNAIRE - PHQ9
1. LITTLE INTEREST OR PLEASURE IN DOING THINGS: NOT AT ALL
2. FEELING DOWN, DEPRESSED OR HOPELESS: NOT AT ALL
SUM OF ALL RESPONSES TO PHQ9 QUESTIONS 1 AND 2: 0

## 2025-04-16 NOTE — PROGRESS NOTES
[facility] Progress Note:    CC: Postop evaluation R CMN for IT fx on 7/5/2024    S: 89 y.o. female s/p R femur CMN on 7/5/24 for a R IT fracture sustained after a mechanical ground level fall. Last seen on 7/31/2024. Today, she reports doing well overall. Pain controlled. Denies new injury. Independently ambulatory without any issues. Here for routine follow-up with no new complaints.     Objective:    Constitutional: NAD  HEENT: hearing and vision grossly intact, MMM  Resp: breathing comfortably   CV: extremities warm, well perfused      Examination of the right lower extremity demonstrates well healed incisions. Able to flex and extend at hip. Full painless range of motion at knee. Good quadriceps strength. Motor intact in plantarflexion/ dorsiflexion/ fires EHL. Sensory intact in all distributions. Extremity warm, well perfused.       Imaging:  XR of the R hip, obtained and personally reviewed today. When compared to XR from 7/2024, the hardware is maintained without any evidence of loosening or cut through. Fracture healed.      A/P: 89F s/p R CMN on 7/5/2024 here for 9 month follow up, doing well. At this point, she can continue to bear weight as tolerated without any restrictions. We will plan to see her back as needed.

## 2025-04-28 ENCOUNTER — TELEPHONE (OUTPATIENT)
Dept: CARDIOLOGY | Facility: CLINIC | Age: OVER 89
End: 2025-04-28
Payer: MEDICARE

## 2025-04-28 NOTE — TELEPHONE ENCOUNTER
This nurse received a message earlier today that pt called requesting a refill on her Metformin. This nurse returned pt's call and informed her that Dr Sanchez does not order Metformin, she needs to get that refilled by her primary care doctor. Pt verbalized understanding.

## 2025-05-05 ENCOUNTER — HOSPITAL ENCOUNTER (OUTPATIENT)
Dept: CARDIOLOGY | Facility: CLINIC | Age: OVER 89
Discharge: HOME | End: 2025-05-05
Payer: MEDICARE

## 2025-05-05 DIAGNOSIS — I44.2 ATRIOVENTRICULAR BLOCK, COMPLETE (MULTI): ICD-10-CM

## 2025-05-05 DIAGNOSIS — Z95.0 PRESENCE OF CARDIAC PACEMAKER: ICD-10-CM

## 2025-05-05 PROCEDURE — 93296 REM INTERROG EVL PM/IDS: CPT

## 2025-05-05 PROCEDURE — 93294 REM INTERROG EVL PM/LDLS PM: CPT | Performed by: INTERNAL MEDICINE

## 2025-06-10 ENCOUNTER — APPOINTMENT (OUTPATIENT)
Dept: RADIOLOGY | Facility: HOSPITAL | Age: OVER 89
DRG: 280 | End: 2025-06-10
Payer: MEDICARE

## 2025-06-10 ENCOUNTER — APPOINTMENT (OUTPATIENT)
Dept: CARDIOLOGY | Facility: HOSPITAL | Age: OVER 89
DRG: 280 | End: 2025-06-10
Payer: MEDICARE

## 2025-06-10 ENCOUNTER — HOSPITAL ENCOUNTER (INPATIENT)
Facility: HOSPITAL | Age: OVER 89
DRG: 280 | End: 2025-06-10
Attending: GENERAL PRACTICE | Admitting: FAMILY MEDICINE
Payer: MEDICARE

## 2025-06-10 DIAGNOSIS — R09.02 HYPOXIA: ICD-10-CM

## 2025-06-10 DIAGNOSIS — N17.9 AKI (ACUTE KIDNEY INJURY): ICD-10-CM

## 2025-06-10 DIAGNOSIS — R73.9 HYPERGLYCEMIA: ICD-10-CM

## 2025-06-10 DIAGNOSIS — I21.4 NSTEMI (NON-ST ELEVATED MYOCARDIAL INFARCTION) (MULTI): ICD-10-CM

## 2025-06-10 DIAGNOSIS — Z95.0 PACEMAKER: ICD-10-CM

## 2025-06-10 DIAGNOSIS — J42 CHRONIC BRONCHITIS, UNSPECIFIED CHRONIC BRONCHITIS TYPE (MULTI): ICD-10-CM

## 2025-06-10 DIAGNOSIS — J20.9 ACUTE BRONCHITIS, UNSPECIFIED ORGANISM: Primary | ICD-10-CM

## 2025-06-10 DIAGNOSIS — I21.4 NON-ST ELEVATION MYOCARDIAL INFARCTION (NSTEMI), SUBSEQUENT EPISODE OF CARE (MULTI): ICD-10-CM

## 2025-06-10 DIAGNOSIS — N17.9 ACUTE KIDNEY INJURY: ICD-10-CM

## 2025-06-10 DIAGNOSIS — R79.89 ELEVATED TROPONIN: ICD-10-CM

## 2025-06-10 DIAGNOSIS — I50.22 CHRONIC SYSTOLIC CONGESTIVE HEART FAILURE: ICD-10-CM

## 2025-06-10 LAB
ALBUMIN SERPL BCP-MCNC: 3.9 G/DL (ref 3.4–5)
ALP SERPL-CCNC: 157 U/L (ref 33–136)
ALT SERPL W P-5'-P-CCNC: 10 U/L (ref 7–45)
ANION GAP SERPL CALC-SCNC: 17 MMOL/L (ref 10–20)
APTT PPP: 30 SECONDS (ref 26–36)
AST SERPL W P-5'-P-CCNC: 13 U/L (ref 9–39)
BASOPHILS # BLD AUTO: 0.03 X10*3/UL (ref 0–0.1)
BASOPHILS NFR BLD AUTO: 0.5 %
BILIRUB SERPL-MCNC: 0.3 MG/DL (ref 0–1.2)
BNP SERPL-MCNC: 304 PG/ML (ref 0–99)
BUN SERPL-MCNC: 34 MG/DL (ref 6–23)
CALCIUM SERPL-MCNC: 7.5 MG/DL (ref 8.6–10.3)
CARDIAC TROPONIN I PNL SERPL HS: 142 NG/L (ref 0–13)
CARDIAC TROPONIN I PNL SERPL HS: 42 NG/L (ref 0–13)
CARDIAC TROPONIN I PNL SERPL HS: 54 NG/L (ref 0–13)
CARDIAC TROPONIN I PNL SERPL HS: 81 NG/L (ref 0–13)
CHLORIDE SERPL-SCNC: 106 MMOL/L (ref 98–107)
CO2 SERPL-SCNC: 20 MMOL/L (ref 21–32)
CREAT SERPL-MCNC: 2.62 MG/DL (ref 0.5–1.05)
EGFRCR SERPLBLD CKD-EPI 2021: 17 ML/MIN/1.73M*2
EOSINOPHIL # BLD AUTO: 0.13 X10*3/UL (ref 0–0.4)
EOSINOPHIL NFR BLD AUTO: 2 %
ERYTHROCYTE [DISTWIDTH] IN BLOOD BY AUTOMATED COUNT: 15.7 % (ref 11.5–14.5)
FLUAV RNA RESP QL NAA+PROBE: NOT DETECTED
FLUBV RNA RESP QL NAA+PROBE: NOT DETECTED
GLUCOSE SERPL-MCNC: 192 MG/DL (ref 74–99)
HCT VFR BLD AUTO: 33.9 % (ref 36–46)
HGB BLD-MCNC: 10 G/DL (ref 12–16)
IMM GRANULOCYTES # BLD AUTO: 0.02 X10*3/UL (ref 0–0.5)
IMM GRANULOCYTES NFR BLD AUTO: 0.3 % (ref 0–0.9)
LYMPHOCYTES # BLD AUTO: 0.69 X10*3/UL (ref 0.8–3)
LYMPHOCYTES NFR BLD AUTO: 10.6 %
MCH RBC QN AUTO: 23 PG (ref 26–34)
MCHC RBC AUTO-ENTMCNC: 29.5 G/DL (ref 32–36)
MCV RBC AUTO: 78 FL (ref 80–100)
MONOCYTES # BLD AUTO: 0.42 X10*3/UL (ref 0.05–0.8)
MONOCYTES NFR BLD AUTO: 6.5 %
NEUTROPHILS # BLD AUTO: 5.22 X10*3/UL (ref 1.6–5.5)
NEUTROPHILS NFR BLD AUTO: 80.1 %
NRBC BLD-RTO: 0 /100 WBCS (ref 0–0)
PLATELET # BLD AUTO: 208 X10*3/UL (ref 150–450)
POTASSIUM SERPL-SCNC: 4 MMOL/L (ref 3.5–5.3)
PROT SERPL-MCNC: 6.4 G/DL (ref 6.4–8.2)
RBC # BLD AUTO: 4.35 X10*6/UL (ref 4–5.2)
RSV RNA RESP QL NAA+PROBE: NOT DETECTED
SARS-COV-2 RNA RESP QL NAA+PROBE: NOT DETECTED
SODIUM SERPL-SCNC: 139 MMOL/L (ref 136–145)
UFH PPP CHRO-ACNC: 0.6 IU/ML (ref ?–1.1)
WBC # BLD AUTO: 6.5 X10*3/UL (ref 4.4–11.3)

## 2025-06-10 PROCEDURE — 85025 COMPLETE CBC W/AUTO DIFF WBC: CPT | Performed by: GENERAL PRACTICE

## 2025-06-10 PROCEDURE — 85730 THROMBOPLASTIN TIME PARTIAL: CPT | Performed by: GENERAL PRACTICE

## 2025-06-10 PROCEDURE — 80053 COMPREHEN METABOLIC PANEL: CPT | Performed by: GENERAL PRACTICE

## 2025-06-10 PROCEDURE — 96374 THER/PROPH/DIAG INJ IV PUSH: CPT

## 2025-06-10 PROCEDURE — 85520 HEPARIN ASSAY: CPT | Performed by: NURSE PRACTITIONER

## 2025-06-10 PROCEDURE — 2500000001 HC RX 250 WO HCPCS SELF ADMINISTERED DRUGS (ALT 637 FOR MEDICARE OP): Performed by: NURSE PRACTITIONER

## 2025-06-10 PROCEDURE — 2500000005 HC RX 250 GENERAL PHARMACY W/O HCPCS: Performed by: FAMILY MEDICINE

## 2025-06-10 PROCEDURE — 94640 AIRWAY INHALATION TREATMENT: CPT

## 2025-06-10 PROCEDURE — 99223 1ST HOSP IP/OBS HIGH 75: CPT | Performed by: INTERNAL MEDICINE

## 2025-06-10 PROCEDURE — 36415 COLL VENOUS BLD VENIPUNCTURE: CPT | Performed by: GENERAL PRACTICE

## 2025-06-10 PROCEDURE — 84484 ASSAY OF TROPONIN QUANT: CPT | Performed by: GENERAL PRACTICE

## 2025-06-10 PROCEDURE — 2500000004 HC RX 250 GENERAL PHARMACY W/ HCPCS (ALT 636 FOR OP/ED): Mod: JZ | Performed by: GENERAL PRACTICE

## 2025-06-10 PROCEDURE — G0378 HOSPITAL OBSERVATION PER HR: HCPCS

## 2025-06-10 PROCEDURE — 2500000002 HC RX 250 W HCPCS SELF ADMINISTERED DRUGS (ALT 637 FOR MEDICARE OP, ALT 636 FOR OP/ED): Performed by: GENERAL PRACTICE

## 2025-06-10 PROCEDURE — 99291 CRITICAL CARE FIRST HOUR: CPT | Mod: 25 | Performed by: GENERAL PRACTICE

## 2025-06-10 PROCEDURE — 2500000005 HC RX 250 GENERAL PHARMACY W/O HCPCS: Performed by: NURSE PRACTITIONER

## 2025-06-10 PROCEDURE — 2500000002 HC RX 250 W HCPCS SELF ADMINISTERED DRUGS (ALT 637 FOR MEDICARE OP, ALT 636 FOR OP/ED): Performed by: NURSE PRACTITIONER

## 2025-06-10 PROCEDURE — 93005 ELECTROCARDIOGRAM TRACING: CPT

## 2025-06-10 PROCEDURE — 2500000004 HC RX 250 GENERAL PHARMACY W/ HCPCS (ALT 636 FOR OP/ED): Mod: JZ | Performed by: NURSE PRACTITIONER

## 2025-06-10 PROCEDURE — 71045 X-RAY EXAM CHEST 1 VIEW: CPT | Performed by: RADIOLOGY

## 2025-06-10 PROCEDURE — 71045 X-RAY EXAM CHEST 1 VIEW: CPT

## 2025-06-10 PROCEDURE — 83880 ASSAY OF NATRIURETIC PEPTIDE: CPT | Performed by: GENERAL PRACTICE

## 2025-06-10 PROCEDURE — 87637 SARSCOV2&INF A&B&RSV AMP PRB: CPT | Performed by: GENERAL PRACTICE

## 2025-06-10 RX ORDER — IPRATROPIUM BROMIDE AND ALBUTEROL SULFATE 2.5; .5 MG/3ML; MG/3ML
3 SOLUTION RESPIRATORY (INHALATION)
Status: DISCONTINUED | OUTPATIENT
Start: 2025-06-10 | End: 2025-06-10

## 2025-06-10 RX ORDER — METOPROLOL SUCCINATE 25 MG/1
25 TABLET, EXTENDED RELEASE ORAL DAILY
Status: DISCONTINUED | OUTPATIENT
Start: 2025-06-10 | End: 2025-06-17 | Stop reason: HOSPADM

## 2025-06-10 RX ORDER — POLYETHYLENE GLYCOL 3350 17 G/17G
17 POWDER, FOR SOLUTION ORAL DAILY
Status: DISCONTINUED | OUTPATIENT
Start: 2025-06-10 | End: 2025-06-17 | Stop reason: HOSPADM

## 2025-06-10 RX ORDER — IPRATROPIUM BROMIDE AND ALBUTEROL SULFATE 2.5; .5 MG/3ML; MG/3ML
3 SOLUTION RESPIRATORY (INHALATION) ONCE
Status: COMPLETED | OUTPATIENT
Start: 2025-06-10 | End: 2025-06-10

## 2025-06-10 RX ORDER — LOSARTAN POTASSIUM 50 MG/1
50 TABLET ORAL DAILY
Status: DISCONTINUED | OUTPATIENT
Start: 2025-06-10 | End: 2025-06-17 | Stop reason: HOSPADM

## 2025-06-10 RX ORDER — IPRATROPIUM BROMIDE AND ALBUTEROL SULFATE 2.5; .5 MG/3ML; MG/3ML
3 SOLUTION RESPIRATORY (INHALATION)
Status: DISCONTINUED | OUTPATIENT
Start: 2025-06-10 | End: 2025-06-17 | Stop reason: HOSPADM

## 2025-06-10 RX ORDER — TALC
9 POWDER (GRAM) TOPICAL NIGHTLY
Status: DISCONTINUED | OUTPATIENT
Start: 2025-06-10 | End: 2025-06-17 | Stop reason: HOSPADM

## 2025-06-10 RX ORDER — ACETAMINOPHEN 160 MG/5ML
650 SOLUTION ORAL EVERY 4 HOURS PRN
Status: DISCONTINUED | OUTPATIENT
Start: 2025-06-10 | End: 2025-06-17 | Stop reason: HOSPADM

## 2025-06-10 RX ORDER — SUCRALFATE 1 G/1
1 TABLET ORAL
Status: DISCONTINUED | OUTPATIENT
Start: 2025-06-10 | End: 2025-06-17 | Stop reason: HOSPADM

## 2025-06-10 RX ORDER — ACETAMINOPHEN 325 MG/1
650 TABLET ORAL EVERY 4 HOURS PRN
Status: DISCONTINUED | OUTPATIENT
Start: 2025-06-10 | End: 2025-06-17 | Stop reason: HOSPADM

## 2025-06-10 RX ORDER — PANTOPRAZOLE SODIUM 40 MG/1
40 TABLET, DELAYED RELEASE ORAL
Status: DISCONTINUED | OUTPATIENT
Start: 2025-06-11 | End: 2025-06-17 | Stop reason: HOSPADM

## 2025-06-10 RX ORDER — NITROGLYCERIN 0.4 MG/1
0.4 TABLET SUBLINGUAL EVERY 5 MIN PRN
Status: DISCONTINUED | OUTPATIENT
Start: 2025-06-10 | End: 2025-06-17 | Stop reason: HOSPADM

## 2025-06-10 RX ORDER — IPRATROPIUM BROMIDE AND ALBUTEROL SULFATE 2.5; .5 MG/3ML; MG/3ML
3 SOLUTION RESPIRATORY (INHALATION) EVERY 2 HOUR PRN
Status: DISCONTINUED | OUTPATIENT
Start: 2025-06-10 | End: 2025-06-17 | Stop reason: HOSPADM

## 2025-06-10 RX ORDER — HEPARIN SODIUM 10000 [USP'U]/100ML
0-4000 INJECTION, SOLUTION INTRAVENOUS CONTINUOUS
Status: DISCONTINUED | OUTPATIENT
Start: 2025-06-10 | End: 2025-06-12

## 2025-06-10 RX ORDER — ACETAMINOPHEN 650 MG/1
650 SUPPOSITORY RECTAL EVERY 4 HOURS PRN
Status: DISCONTINUED | OUTPATIENT
Start: 2025-06-10 | End: 2025-06-17 | Stop reason: HOSPADM

## 2025-06-10 RX ADMIN — IPRATROPIUM BROMIDE AND ALBUTEROL SULFATE 3 ML: 2.5; .5 SOLUTION RESPIRATORY (INHALATION) at 11:24

## 2025-06-10 RX ADMIN — Medication 9 MG: at 21:04

## 2025-06-10 RX ADMIN — HEPARIN SODIUM 500 UNITS/HR: 10000 INJECTION, SOLUTION INTRAVENOUS at 15:10

## 2025-06-10 RX ADMIN — METHYLPREDNISOLONE SODIUM SUCCINATE 40 MG: 40 INJECTION, POWDER, FOR SOLUTION INTRAMUSCULAR; INTRAVENOUS at 23:58

## 2025-06-10 RX ADMIN — LOSARTAN POTASSIUM 50 MG: 50 TABLET, FILM COATED ORAL at 18:14

## 2025-06-10 RX ADMIN — ACETAMINOPHEN 650 MG: 325 TABLET, FILM COATED ORAL at 23:35

## 2025-06-10 RX ADMIN — Medication 3 L/MIN: at 19:13

## 2025-06-10 RX ADMIN — METHYLPREDNISOLONE SODIUM SUCCINATE 125 MG: 125 INJECTION, POWDER, FOR SOLUTION INTRAMUSCULAR; INTRAVENOUS at 16:07

## 2025-06-10 RX ADMIN — SUCRALFATE 1 G: 1 TABLET ORAL at 18:14

## 2025-06-10 RX ADMIN — METOPROLOL SUCCINATE 25 MG: 25 TABLET, EXTENDED RELEASE ORAL at 18:14

## 2025-06-10 RX ADMIN — SUCRALFATE 1 G: 1 TABLET ORAL at 23:33

## 2025-06-10 RX ADMIN — IPRATROPIUM BROMIDE AND ALBUTEROL SULFATE 3 ML: 2.5; .5 SOLUTION RESPIRATORY (INHALATION) at 19:13

## 2025-06-10 SDOH — HEALTH STABILITY: MENTAL HEALTH: HOW OFTEN DO YOU HAVE SIX OR MORE DRINKS ON ONE OCCASION?: NEVER

## 2025-06-10 SDOH — SOCIAL STABILITY: SOCIAL INSECURITY
WITHIN THE LAST YEAR, HAVE YOU BEEN RAPED OR FORCED TO HAVE ANY KIND OF SEXUAL ACTIVITY BY YOUR PARTNER OR EX-PARTNER?: NO

## 2025-06-10 SDOH — SOCIAL STABILITY: SOCIAL INSECURITY
WITHIN THE LAST YEAR, HAVE YOU BEEN KICKED, HIT, SLAPPED, OR OTHERWISE PHYSICALLY HURT BY YOUR PARTNER OR EX-PARTNER?: NO

## 2025-06-10 SDOH — SOCIAL STABILITY: SOCIAL INSECURITY: WITHIN THE LAST YEAR, HAVE YOU BEEN HUMILIATED OR EMOTIONALLY ABUSED IN OTHER WAYS BY YOUR PARTNER OR EX-PARTNER?: NO

## 2025-06-10 SDOH — ECONOMIC STABILITY: INCOME INSECURITY: IN THE PAST 12 MONTHS HAS THE ELECTRIC, GAS, OIL, OR WATER COMPANY THREATENED TO SHUT OFF SERVICES IN YOUR HOME?: NO

## 2025-06-10 SDOH — ECONOMIC STABILITY: FOOD INSECURITY: WITHIN THE PAST 12 MONTHS, YOU WORRIED THAT YOUR FOOD WOULD RUN OUT BEFORE YOU GOT THE MONEY TO BUY MORE.: NEVER TRUE

## 2025-06-10 SDOH — ECONOMIC STABILITY: FOOD INSECURITY: WITHIN THE PAST 12 MONTHS, THE FOOD YOU BOUGHT JUST DIDN'T LAST AND YOU DIDN'T HAVE MONEY TO GET MORE.: NEVER TRUE

## 2025-06-10 SDOH — SOCIAL STABILITY: SOCIAL INSECURITY: HAS ANYONE EVER THREATENED TO HURT YOUR FAMILY OR YOUR PETS?: NO

## 2025-06-10 SDOH — HEALTH STABILITY: MENTAL HEALTH: HOW OFTEN DO YOU HAVE A DRINK CONTAINING ALCOHOL?: NEVER

## 2025-06-10 SDOH — SOCIAL STABILITY: SOCIAL INSECURITY: DO YOU FEEL UNSAFE GOING BACK TO THE PLACE WHERE YOU ARE LIVING?: NO

## 2025-06-10 SDOH — SOCIAL STABILITY: SOCIAL INSECURITY: WERE YOU ABLE TO COMPLETE ALL THE BEHAVIORAL HEALTH SCREENINGS?: YES

## 2025-06-10 SDOH — SOCIAL STABILITY: SOCIAL INSECURITY: WITHIN THE LAST YEAR, HAVE YOU BEEN AFRAID OF YOUR PARTNER OR EX-PARTNER?: NO

## 2025-06-10 SDOH — HEALTH STABILITY: MENTAL HEALTH: HOW MANY DRINKS CONTAINING ALCOHOL DO YOU HAVE ON A TYPICAL DAY WHEN YOU ARE DRINKING?: PATIENT DOES NOT DRINK

## 2025-06-10 SDOH — SOCIAL STABILITY: SOCIAL INSECURITY: HAVE YOU HAD THOUGHTS OF HARMING ANYONE ELSE?: NO

## 2025-06-10 SDOH — SOCIAL STABILITY: SOCIAL INSECURITY: HAVE YOU HAD ANY THOUGHTS OF HARMING ANYONE ELSE?: NO

## 2025-06-10 SDOH — SOCIAL STABILITY: SOCIAL INSECURITY: DOES ANYONE TRY TO KEEP YOU FROM HAVING/CONTACTING OTHER FRIENDS OR DOING THINGS OUTSIDE YOUR HOME?: NO

## 2025-06-10 SDOH — SOCIAL STABILITY: SOCIAL INSECURITY: DO YOU FEEL ANYONE HAS EXPLOITED OR TAKEN ADVANTAGE OF YOU FINANCIALLY OR OF YOUR PERSONAL PROPERTY?: NO

## 2025-06-10 SDOH — SOCIAL STABILITY: SOCIAL INSECURITY: ARE YOU OR HAVE YOU BEEN THREATENED OR ABUSED PHYSICALLY, EMOTIONALLY, OR SEXUALLY BY ANYONE?: NO

## 2025-06-10 SDOH — SOCIAL STABILITY: SOCIAL INSECURITY: ARE THERE ANY APPARENT SIGNS OF INJURIES/BEHAVIORS THAT COULD BE RELATED TO ABUSE/NEGLECT?: NO

## 2025-06-10 SDOH — SOCIAL STABILITY: SOCIAL INSECURITY: ABUSE: ADULT

## 2025-06-10 ASSESSMENT — COGNITIVE AND FUNCTIONAL STATUS - GENERAL
TOILETING: A LITTLE
MOBILITY SCORE: 24
MOBILITY SCORE: 19
STANDING UP FROM CHAIR USING ARMS: A LITTLE
CLIMB 3 TO 5 STEPS WITH RAILING: A LOT
PATIENT BASELINE BEDBOUND: NO
WALKING IN HOSPITAL ROOM: A LITTLE
DAILY ACTIVITIY SCORE: 23
MOVING TO AND FROM BED TO CHAIR: A LITTLE
DAILY ACTIVITIY SCORE: 24

## 2025-06-10 ASSESSMENT — ACTIVITIES OF DAILY LIVING (ADL)
JUDGMENT_ADEQUATE_SAFELY_COMPLETE_DAILY_ACTIVITIES: YES
BATHING: INDEPENDENT
FEEDING YOURSELF: INDEPENDENT
HEARING - LEFT EAR: FUNCTIONAL
WALKS IN HOME: INDEPENDENT
LACK_OF_TRANSPORTATION: NO
HEARING - RIGHT EAR: FUNCTIONAL
ADEQUATE_TO_COMPLETE_ADL: YES
DRESSING YOURSELF: INDEPENDENT
TOILETING: INDEPENDENT
GROOMING: INDEPENDENT
ASSISTIVE_DEVICE: DENTURES UPPER;DENTURES LOWER
PATIENT'S MEMORY ADEQUATE TO SAFELY COMPLETE DAILY ACTIVITIES?: YES

## 2025-06-10 ASSESSMENT — PAIN - FUNCTIONAL ASSESSMENT
PAIN_FUNCTIONAL_ASSESSMENT: 0-10
PAIN_FUNCTIONAL_ASSESSMENT: 0-10

## 2025-06-10 ASSESSMENT — COLUMBIA-SUICIDE SEVERITY RATING SCALE - C-SSRS
6. HAVE YOU EVER DONE ANYTHING, STARTED TO DO ANYTHING, OR PREPARED TO DO ANYTHING TO END YOUR LIFE?: NO
1. IN THE PAST MONTH, HAVE YOU WISHED YOU WERE DEAD OR WISHED YOU COULD GO TO SLEEP AND NOT WAKE UP?: NO
2. HAVE YOU ACTUALLY HAD ANY THOUGHTS OF KILLING YOURSELF?: NO

## 2025-06-10 ASSESSMENT — LIFESTYLE VARIABLES
SKIP TO QUESTIONS 9-10: 1
AUDIT-C TOTAL SCORE: 0

## 2025-06-10 ASSESSMENT — PAIN SCALES - GENERAL
PAINLEVEL_OUTOF10: 0 - NO PAIN
PAINLEVEL_OUTOF10: 0 - NO PAIN

## 2025-06-10 ASSESSMENT — PATIENT HEALTH QUESTIONNAIRE - PHQ9
SUM OF ALL RESPONSES TO PHQ9 QUESTIONS 1 & 2: 0
2. FEELING DOWN, DEPRESSED OR HOPELESS: NOT AT ALL
1. LITTLE INTEREST OR PLEASURE IN DOING THINGS: NOT AT ALL

## 2025-06-10 NOTE — CARE PLAN
The patient's goals for the shift include      The clinical goals for the shift include pt will remain hds      Problem: Pain - Adult  Goal: Verbalizes/displays adequate comfort level or baseline comfort level  Outcome: Progressing     Problem: Safety - Adult  Goal: Free from fall injury  Outcome: Progressing     Problem: Discharge Planning  Goal: Discharge to home or other facility with appropriate resources  Outcome: Progressing     Problem: Chronic Conditions and Co-morbidities  Goal: Patient's chronic conditions and co-morbidity symptoms are monitored and maintained or improved  Outcome: Progressing     Problem: Nutrition  Goal: Nutrient intake appropriate for maintaining nutritional needs  Outcome: Progressing

## 2025-06-10 NOTE — PROGRESS NOTES
Pharmacy Medication History     Source of Information: Per. Patient was not able to take any of her meds today , because she want feeling well , but she took all her scheduled meds at home yesterday. Patient has nitroglycerin 0.4 mg SL tab, she only use this medication if needed, patient hasn't had to use.    Additional concerns with the patient's PTA list.   N/A  Notified Provider via [facility]iku : No    The following updates were made to the Prior to Admission medication list:     Medications ADDED:   N/A  Medications CHANGED:  N/A  Medications REMOVED:   N/A  Medications NOT TAKING:   N/A    Allergy reviewed : Yes    Meds 2 Beds : Yes    Outpatient pharmacy confirmed and updated in chart : Yes    Pharmacy name: [facility] [address]    The list below reflectives the updated PTA list. Please review each medication in order reconciliation for additional clarification and justification.    Prior to Admission Medications   Prescriptions Last Dose   acetaminophen (Tylenol) 325 mg tablet 6/9/2025   Sig: Take 2 tablets (650 mg) by mouth every 4 hours if needed for mild pain (1 - 3) or moderate pain (4 - 6).   empagliflozin (Jardiance) 10 mg 6/9/2025   Sig: Take 1 tablet (10 mg) by mouth once daily.   famotidine (Pepcid) 20 mg tablet 6/9/2025   Sig: Take 1 tablet (20 mg) by mouth once daily.   furosemide (Lasix) 40 mg tablet 6/9/2025   Sig: Take 1 tablet (40 mg) by mouth once daily. Take with Meals   losartan (Cozaar) 50 mg tablet 6/9/2025   Sig: Take 1 tablet (50 mg) by mouth once daily.   melatonin 10 mg tablet 6/9/2025 Bedtime   Sig: Take 1 tablet (10 mg) by mouth once daily at bedtime.   metFORMIN (Glucophage) 500 mg tablet 6/9/2025   Sig: Take 1 tablet (500 mg) by mouth every 12 hours. With food   metoprolol succinate XL (Toprol-XL) 25 mg 24 hr tablet 6/9/2025   Sig: Take 1 tablet (25 mg) by mouth once daily. Do not crush or chew.   nitroglycerin (Nitrostat) 0.4 mg SL tablet    Sig: Place 1  tablet (0.4 mg) under the tongue every 5 minutes if needed for chest pain (UP TO 3 DOSES AS NEEDED FOR CHEST PAIN.CALL 911 IF PAIN PERSISTS.).   sucralfate (Carafate) 1 gram tablet 6/9/2025   Sig: Take 1 tablet (1 g) by mouth 4 times a day.      Facility-Administered Medications: None       The list below reflectives the updated allergy list. Please review each documented allergy for additional clarification and justification.    Allergies   Allergen Reactions    Aspirin GI bleeding    Codeine Other     syncope    Hydrocortisone Itching          06/10/25 at 4:21 PM - Adrianna Dickson

## 2025-06-10 NOTE — ED TRIAGE NOTES
Ptr reports to ED via EMS for SOB abd flu like symptoms. Per EMS pt was 90% on RA and was put on 2l/min O2 via NC. Pt has pacemaker. Denies chest pain, N/V/D. Pt is ANOx4, live alone at home.

## 2025-06-10 NOTE — ED PROVIDER NOTES
HPI   Chief Complaint   Patient presents with    Shortness of Breath    Flu Symptoms       HPI: 89-year-old with a history of DM2 and HTN presents for cough.  She says that over the past week she first developed a sore throat which developed into a productive cough.  She denies chest pain and overt shortness of breath.  She was noted to be hypoxic to 90% on room air and was placed on nasal cannula by EMS.  She denies sick contacts, fever and chills      Limitations to history: None  Independent Historians: Patient  External Records Reviewed: HIE, outpatient notes, inpatient notes  ------------------------------------------------------------------------------------------------------------------------------------------  ROS: a ten point review of systems was performed and was negative except as per HPI.  ------------------------------------------------------------------------------------------------------------------------------------------  PMH / PSH: as per HPI, otherwise reviewed in EMR  MEDS: as per HPI, otherwise reviewed in EMR  ALLERGIES: as per HPI, otherwise reviewed in EMR  SocH:  as per HPI, otherwise reviewed in EMR  FH:  as per HPI, otherwise reviewed in EMR  ------------------------------------------------------------------------------------------------------------------------------------------  Physical Exam:  VS: As documented in the triage note and EMR flowsheet from this visit was reviewed  General: Well appearing. No acute distress.   Eyes:  Extraocular movements grossly intact. No scleral icterus. No discharge  HEENT:  Normocephalic.  Atraumatic  Neck: Moves neck freely. No gross masses  CV: Regular rhythm. No murmurs, rubs or gallops   Resp: Expiratory wheezing bilaterally.  No conversational dyspnea or accessory muscle use  GI: Soft, no masses, nontender. No rebound tenderness or guarding  MSK: Symmetric muscle bulk. No deformities. No lower extremity edema.    Skin: Warm, dry, intact.   Neuro:  No focal deficits.  A&O x3.   Psych: Appropriate for situation  ------------------------------------------------------------------------------------------------------------------------------------------  Hospital Course / Medical Decision Making:  Independent Interpretations: Chest x-ray  EKG as interpreted by me: Paced rhythm at 110 bpm with no signs of acute ischemia    MDM: 89-year-old female with a history of DM2 and HTN presents for cough.  She is requiring 2 to 3 L of oxygen via nasal cannula to maintain her O2 saturation.  I have a low suspicion for pulmonary embolism as she says that her symptoms began with a sore throat and progressed into a productive cough.  No consolidation noted on chest x-ray.  Her presentation is more consistent in my opinion with a viral bronchitis.  Her troponin is slowly uptrending.  She is denying chest pain.  EKG is nonischemic.  She was started on a heparin infusion for NSTEMI.  Viral swabs are negative.  She does have an acute kidney injury and BNP is elevated at over 300.  She was given nebulizer treatments and Solu-Medrol.  The patient was admitted to the medicine service for further management    Discussion of Management with Other Providers:   I discussed the patient/results with: Emergency medicine team    Final diagnosis and disposition as below.    Results for orders placed or performed during the hospital encounter of 06/10/25  -CBC and Auto Differential:   Collection Time: 06/10/25 10:09 AM       Result                      Value             Ref Range           WBC                         6.5               4.4 - 11.3 x*       nRBC                        0.0               0.0 - 0.0 /1*       RBC                         4.35              4.00 - 5.20 *       Hemoglobin                  10.0 (L)          12.0 - 16.0 *       Hematocrit                  33.9 (L)          36.0 - 46.0 %       MCV                         78 (L)            80 - 100 fL         MCH                          23.0 (L)          26.0 - 34.0 *       MCHC                        29.5 (L)          32.0 - 36.0 *       RDW                         15.7 (H)          11.5 - 14.5 %       Platelets                   208               150 - 450 x1*       Neutrophils %               80.1              40.0 - 80.0 %       Immature Granulocytes *     0.3               0.0 - 0.9 %         Lymphocytes %               10.6              13.0 - 44.0 %       Monocytes %                 6.5               2.0 - 10.0 %        Eosinophils %               2.0               0.0 - 6.0 %         Basophils %                 0.5               0.0 - 2.0 %         Neutrophils Absolute        5.22              1.60 - 5.50 *       Immature Granulocytes *     0.02              0.00 - 0.50 *       Lymphocytes Absolute        0.69 (L)          0.80 - 3.00 *       Monocytes Absolute          0.42              0.05 - 0.80 *       Eosinophils Absolute        0.13              0.00 - 0.40 *       Basophils Absolute          0.03              0.00 - 0.10 *  -Comprehensive metabolic panel:   Collection Time: 06/10/25 10:09 AM       Result                      Value             Ref Range           Glucose                     192 (H)           74 - 99 mg/dL       Sodium                      139               136 - 145 mm*       Potassium                   4.0               3.5 - 5.3 mm*       Chloride                    106               98 - 107 mmo*       Bicarbonate                 20 (L)            21 - 32 mmol*       Anion Gap                   17                10 - 20 mmol*       Urea Nitrogen               34 (H)            6 - 23 mg/dL        Creatinine                  2.62 (H)          0.50 - 1.05 *       eGFR                        17 (L)            >60 mL/min/1*       Calcium                     7.5 (L)           8.6 - 10.3 m*       Albumin                     3.9               3.4 - 5.0 g/*       Alkaline Phosphatase        157 (H)           33 -  136 U/L        Total Protein               6.4               6.4 - 8.2 g/*       AST                         13                9 - 39 U/L          Bilirubin, Total            0.3               0.0 - 1.2 mg*       ALT                         10                7 - 45 U/L     -Troponin I, High Sensitivity:   Collection Time: 06/10/25 10:09 AM       Result                      Value             Ref Range           Troponin I, High Sensi*     42 (H)            0 - 13 ng/L    -B-Type Natriuretic Peptide:   Collection Time: 06/10/25 10:09 AM       Result                      Value             Ref Range           BNP                         304 (H)           0 - 99 pg/mL   -Troponin I, High Sensitivity:   Collection Time: 06/10/25 11:01 AM       Result                      Value             Ref Range           Troponin I, High Sensi*     54 (HH)           0 - 13 ng/L    -Sars-CoV-2 and Influenza A/B PCR:   Collection Time: 06/10/25 11:18 AM       Result                      Value             Ref Range           Flu A Result                Not Detected      Not Detected        Flu B Result                Not Detected      Not Detected        Coronavirus 2019, PCR       Not Detected      Not Detected   -RSV PCR:   Collection Time: 06/10/25 11:18 AM       Result                      Value             Ref Range           RSV PCR                     Not Detected      Not Detected   -Troponin I, High Sensitivity:   Collection Time: 06/10/25 12:25 PM       Result                      Value             Ref Range           Troponin I, High Sensi*     81 (HH)           0 - 13 ng/L    -Troponin I, High Sensitivity:   Collection Time: 06/10/25  2:06 PM       Result                      Value             Ref Range           Troponin I, High Sensi*     142 (HH)          0 - 13 ng/L    -aPTT - baseline:   Collection Time: 06/10/25  2:55 PM       Result                      Value             Ref Range           aPTT                         30                26 - 36 seco*  XR chest 1 view   Final Result    Stable cardiac pacemaker on the left.          Findings of underlying COPD and emphysema.          No significant or acute interval change.          MACRO:    None          Signed by: Dillon Catalan 6/10/2025 11:33 AM    Dictation workstation:   [id number]                   Patient History   Medical History[1]  Surgical History[2]  Family History[3]  Social History[4]    Physical Exam   ED Triage Vitals   Temperature Heart Rate Respirations BP   06/10/25 0950 06/10/25 0950 06/10/25 0950 06/10/25 0950   37 °C (98.6 °F) (!) 107 17 (!) 245/96      Pulse Ox Temp Source Heart Rate Source Patient Position   06/10/25 0950 06/10/25 0950 06/10/25 1100 --   (!) 90 % Temporal Monitor       BP Location FiO2 (%)     -- --             Physical Exam      ED Course & MDM   Diagnoses as of 06/10/25 1637   Acute bronchitis, unspecified organism   Hypoxia   Acute kidney injury   NSTEMI (non-ST elevated myocardial infarction) (Multi)                 No data recorded     [place] Coma Scale Score: 15 (06/10/25 1020 : Hector Martinez RN)                           Medical Decision Making      Procedure  Critical Care    Performed by: Fadi Roman DO  Authorized by: Fadi Roman DO    Critical care provider statement:     Critical care time (minutes):  35    Critical care time was exclusive of:  Separately billable procedures and treating other patients    Critical care was necessary to treat or prevent imminent or life-threatening deterioration of the following conditions:  Respiratory failure    Critical care was time spent personally by me on the following activities:  Development of treatment plan with patient or surrogate, evaluation of patient's response to treatment, re-evaluation of patient's condition, pulse oximetry, ordering and review of radiographic studies and ordering and review of laboratory studies    Care discussed with: admitting provider           [1]    Past Medical History:  Diagnosis Date    Personal history of other diseases of the circulatory system     History of hypertension    Personal history of other endocrine, nutritional and metabolic disease     History of high cholesterol   [2]   Past Surgical History:  Procedure Laterality Date    CHOLECYSTECTOMY  2018    Cholecystectomy    CT ABDOMEN PELVIS ANGIOGRAM W AND/OR WO IV CONTRAST  2019    CT ABDOMEN PELVIS ANGIOGRAM W AND/OR WO IV CONTRAST 2019 [facility] [place] [facility] ANCILLARY LEGACY    CT ABDOMEN PELVIS ANGIOGRAM W AND/OR WO IV CONTRAST  10/16/2022    CT ABDOMEN PELVIS ANGIOGRAM W AND/OR WO IV CONTRAST 10/16/2022 [facility]    CT ANGIO AORTA AND BILATERAL ILIOFEMORAL RUN OFF INCLUDING WITHOUT CONTRAST IF PERFORMED  2019    CT AORTA AND BILATERAL ILIOFEMORAL RUNOFF ANGIOGRAM W AND/OR WO IV CONTRAST 2019 [facility] [place] [facility] INPATIENT LEGACY    HYSTERECTOMY  2018    Hysterectomy    IR ANGIOGRAM AORTA ABDOMEN  2019    IR ANGIOGRAM AORTA ABDOMEN 2019 [facility] INPATIENT LEGACY    OTHER SURGICAL HISTORY  2018    Proximal Subtotal Pancreatectomy (Whipple Procedure)    OTHER SURGICAL HISTORY  2019    Pacemaker insertion    OTHER SURGICAL HISTORY  2019    Appendectomy laparoscopic   [3]   Family History  Problem Relation Name Age of Onset    Hyperlipidemia Mother      Hyperlipidemia Father     [4]   Social History  Tobacco Use    Smoking status: Former     Current packs/day: 0.00     Types: Cigarettes     Quit date:      Years since quittin.4     Passive exposure: Past    Smokeless tobacco: Never   Vaping Use    Vaping status: Never Used   Substance Use Topics    Alcohol use: Never    Drug use: Never        Fadi Roman DO  06/10/25 0536

## 2025-06-10 NOTE — CONSULTS
"Inpatient consult to Cardiology  Consult performed by: Ruben Sanchez DO  Consult ordered by: Ulises Wu, APRN-CNP  Reason for consult: elevated troponin, nstemi        History Of Present Illness:    Maci Catalan is a 89 y.o. female who has a pertinent medical history including, Hx of Perioperative NSTEMI ( Occuring in setting of EVAR 11/2019, Troponin Peak 18.45 ) three-vessel coronary artery disease S/P PCI (11/22/2019) to proximal LAD with a 2.75 x 12 mm Resolute Rc drug-eluting stent postdilated up to 3.5 mm ) Chronic Systolic Heart Failure with LVEF 35% ( 11/2019 ) with Mid and apical anterior septum, mid and apical inferior septum, and basal and mid inferior wall motion abnormalities, HX of AAA S/P EVAR 11/2019 ) Hx of Complete Heart Block S/P Medtronic Dual Chamber System, and Hx of Pancreatic Tumor S/P Whipple who presented to the Emergency department with complaints of Cough X 1 week.  Cardiology has been consulted for \"Elevated Troponin, Nstemi\"  A full hospital course review was completed.    States that she has been in her usual state of health up until about 1 week ago.  She had significant coughing, sneezing, runny nose.  She denies tightness of the chest, nor heaviness.  She notes that her rhinorrhea was so intense, that she used up an entire \"box of Kleenex in 1 day\".  Continuing to feel unwell, she presented to the emergency department for further evaluation.  On arrival, blood pressure is documented to be extreme At 245/96 with a heart rate of 107 respiratory rate 17 saturating 90% on room air.  Roughly 10 minutes later, blood pressure was down to 148 and has remained normotensive.  She was placed on 2 L of nasal cannula.  She reports that she is already feeling better.    Patient denies chest pain and angina.  Pt denies shortness of breath, dyspnea on exertion, orthopnea, and paroxysmal nocturnal dyspnea.  Pt denies worsening lower extremity edema.  Pt denies palpitations or syncope.  " No recent falls.  No fever or chills.  Reports cough.  Reports a congestion, sneezing, rhinorrhea.  No change in bowel or bladder habits.  No travel.  No sick contacts.  No recent travel    12 point review of systems was performed and is otherwise negative.  Past medical history:  As above.  Medications:  Reviewed.  Allergies:  Reviewed.  Social history:  Patient denies smoking, alcohol abuse, or illicit drug use.  Family history:  No sudden cardiac death or premature coronary artery disease.     Last Recorded Vitals:  Vitals:    06/10/25 1430 06/10/25 1500 06/10/25 1530 06/10/25 1600   BP: 128/56 107/55 117/62 128/57   Pulse: 91 94 94 88   Resp: 17 18 18 17   Temp:       TempSrc:       SpO2: 95% 94% 96% 95%   Weight:       Height:             Documented Fluid Status   No intake or output data in the 24 hours ending 06/10/25 1649  Net IO Since Admission: No IO data has been entered for this period [06/10/25 1649]    Last Labs:  CBC - 6/10/2025: 10:09 AM  6.5 10.0 208    33.9      CMP - 6/10/2025: 10:09 AM  7.5 6.4 13 --- 0.3   _ 3.9 10 157      PTT - 6/10/2025:  2:55 PM  1.0   11.7 30     Component      Latest Ref Rng 1/31/2023 3/25/2024 6/10/2025   Troponin I, High Sensitivity      0 - 13 ng/L 21 (H)  21 (H)  142 (HH)    Troponin I, High Sensitivity         81 (HH)    Troponin I, High Sensitivity         54 (HH)    Troponin I, High Sensitivity         42 (H)       Legend:  (H) High  (HH) High Panic       Last I/O:  No intake/output data recorded.    Past Cardiology Tests (Last 3 Years):  EKG:  ECG 12 lead (Clinic Performed) 06/10/2025 A/v pACING         ECG 12 lead (Clinic Performed) 04/08/2025 A-V pacing       ECG 12 lead (Clinic Performed) 02/25/2025  - A-V Pacing           Echo:  Transthoracic echo (TTE) complete 02/11/2025   1. Left ventricular ejection fraction is moderately decreased, calculated by Spaulding's biplane at 35%.   2. There is global hypokinesis of the left ventricle with minor regional  variations.   3. Spectral Doppler shows a Grade I (impaired relaxation pattern) of left ventricular diastolic filling with an elevated left atrial pressure.   4. There is normal right ventricular global systolic function.   5. Right ventricular systolic pressure is within normal limits.    Echocardiogram 01/2021   1. The left ventricular systolic function is normal with a 55% estimated ejection fraction.   2. Mid and apical inferior septum and basal inferior segment are abnormal.This is unchanged.   3. Moderately increased left ventricular septal thickness.   4. There is an elevated mean left atrial pressure.   5. Slightly elevated RVSP.   6. The left atrium is abnormally small.   7. The left ventricular cavity size is decreased      Ejection Fractions:  EF   Date/Time Value Ref Range Status   02/11/2025 11:58 AM 35 %      Cath:  No results found for this or any previous visit from the past 1095 days.    Stress Test:  No results found for this or any previous visit from the past 1095 days.    Cardiac Imaging: - Hyperinflation with PPM / No Infiltrates / Effusions ( my read)    CXR - 6/10/2025      Past Medical History:  She has a past medical history of Personal history of other diseases of the circulatory system and Personal history of other endocrine, nutritional and metabolic disease.    Past Surgical History:  She has a past surgical history that includes Hysterectomy (04/17/2018); Other surgical history (06/19/2018); Cholecystectomy (06/19/2018); Other surgical history (06/21/2019); Other surgical history (06/21/2019); CT angio abdomen pelvis w and or wo IV IV contrast (8/16/2019); CT angio aorta and bilateral iliofemoral runoff including without contrast if performed (11/7/2019); IR angiogram aorta abdomen (11/6/2019); and CT angio abdomen pelvis w and or wo IV IV contrast (10/16/2022).      Social History:  She reports that she quit smoking about 8 years ago. Her smoking use included cigarettes. She has been  exposed to tobacco smoke. She has never used smokeless tobacco. She reports that she does not drink alcohol and does not use drugs.    Family History:  Family History[1]     Allergies:  Aspirin, Codeine, and Hydrocortisone    Inpatient Medications:  Scheduled Medications[2]  PRN Medications[3]  Continuous Medications[4]  Outpatient Medications:  Current Outpatient Medications   Medication Instructions    acetaminophen (TYLENOL) 650 mg, oral, Every 4 hours PRN    empagliflozin (JARDIANCE) 10 mg, oral, Daily    famotidine (PEPCID) 20 mg, Daily    furosemide (LASIX) 40 mg, oral, Daily, Take with Meals    losartan (COZAAR) 50 mg, oral, Daily    melatonin 10 mg, Nightly    metFORMIN (Glucophage) 500 mg tablet 1 tablet, Every 12 hours    metoprolol succinate XL (TOPROL-XL) 25 mg, oral, Daily, Do not crush or chew.    nitroglycerin (Nitrostat) 0.4 mg SL tablet 1 tablet, Every 5 min PRN    sucralfate (Carafate) 1 gram tablet 1 tablet, 4 times daily       Physical Exam:  Documented Vital Signs   Heart Rate:  []   Temperature:  [37 °C (98.6 °F)]   Respirations:  [17-22]   BP: (107-245)/(55-96)   Height:  [152.4 cm (5')]   Weight:  [43.1 kg (95 lb)]   Pulse Ox:  [90 %-100 %]   Temperature:  [37 °C (98.6 °F)] 37 °C (98.6 °F)  Heart Rate:  [] 88  Respirations:  [17-22] 17  BP: (107-245)/(55-96) 128/57     Documented Fluid Status   No intake or output data in the 24 hours ending 06/10/25 1650  Net IO Since Admission: No IO data has been entered for this period [06/10/25 1650]    .         Assessment/Plan   Maci Catalan is a 89 y.o. female who has a pertinent medical history including, Hx of Perioperative NSTEMI ( Occuring in setting of EVAR 11/2019, Troponin Peak 18.45 ) three-vessel coronary artery disease S/P PCI (11/22/2019) to proximal LAD with a 2.75 x 12 mm Resolute [place] drug-eluting stent postdilated up to 3.5 mm ) Chronic Systolic Heart Failure with LVEF 35% ( 11/2019 ) with Mid and apical anterior  "septum, mid and apical inferior septum, and basal and mid inferior wall motion abnormalities, HX of AAA S/P EVAR 11/2019 ) Hx of Complete Heart Block S/P Medtronic Dual Chamber System, and Hx of Pancreatic Tumor S/P Whipple who presented to the Emergency department with complaints of Cough X 1 week.  Cardiology has been consulted for \"Elevated Troponin, Nstemi\"  A full hospital course review was completed.    States that she has been in her usual state of health up until about 1 week ago.  She had significant coughing, sneezing, runny nose.  She denies tightness of the chest, nor heaviness.  She notes that her rhinorrhea was so intense, that she used up an entire \"box of Kleenex in 1 day\".  Continuing to feel unwell, she presented to the emergency department for further evaluation.  On arrival, blood pressure is documented to be extreme At 245/96 with a heart rate of 107 respiratory rate 17 saturating 90% on room air.  Roughly 10 minutes later, blood pressure was down to 148 and has remained normotensive.  She was placed on 2 L of nasal cannula.  She reports that she is already feeling better.    # Elevated high-sensitivity cardiac troponin  -- Etiology unclear as to whether or not this is a true ACS event versus acute myocardial injury in the setting of viral illness  -- For now, reasonable to continue heparin  -- We will obtain a transthoracic echocardiogram for structural evaluation including ejection fraction, assessment of regional wall motion abnormalities or valvular disease, and further evaluation of hemodynamics.  --Continue chronic cardiac medications  -- She can be medically managed at this time, can have diet in AM.    # Hypertensive urgency present on admission  -- Unclear if this is truly her blood pressure is documented or rather clerical entry issue.  Historically, she has not had dramatically elevated blood pressures in the outpatient setting.  Her EMS records are not currently available to " ascertain whether she was hypertensive on initial EMS encounter  -- For now, continue to trend.  Continue chronic medications.             Code Status:  Full Code      Ruben Sanchez DO   Division of Cardiovascular Medicine  [facility]             [1]   Family History  Problem Relation Name Age of Onset    Hyperlipidemia Mother      Hyperlipidemia Father     [2]   Scheduled medications   Medication Dose Route Frequency   [3]   PRN medications   Medication    heparin   [4]   Continuous Medications   Medication Dose Last Rate    heparin  0-4,000 Units/hr 500 Units/hr (06/10/25 1510)

## 2025-06-11 ENCOUNTER — APPOINTMENT (OUTPATIENT)
Dept: CARDIOLOGY | Facility: HOSPITAL | Age: OVER 89
DRG: 280 | End: 2025-06-11
Payer: MEDICARE

## 2025-06-11 PROBLEM — J20.9 ACUTE BRONCHITIS, UNSPECIFIED ORGANISM: Status: ACTIVE | Noted: 2025-06-11

## 2025-06-11 LAB
ANION GAP SERPL CALC-SCNC: 16 MMOL/L (ref 10–20)
AORTIC VALVE PEAK VELOCITY: 1.06 M/S
ATRIAL RATE: 110 BPM
AV PEAK GRADIENT: 5 MMHG
BUN SERPL-MCNC: 36 MG/DL (ref 6–23)
CALCIUM SERPL-MCNC: 7.4 MG/DL (ref 8.6–10.3)
CARDIAC TROPONIN I PNL SERPL HS: 166 NG/L (ref 0–13)
CARDIAC TROPONIN I PNL SERPL HS: 196 NG/L (ref 0–13)
CARDIAC TROPONIN I PNL SERPL HS: 245 NG/L (ref 0–13)
CHLORIDE SERPL-SCNC: 102 MMOL/L (ref 98–107)
CHOLEST SERPL-MCNC: 139 MG/DL (ref 0–199)
CHOLESTEROL/HDL RATIO: 2.8
CO2 SERPL-SCNC: 20 MMOL/L (ref 21–32)
CREAT SERPL-MCNC: 2.51 MG/DL (ref 0.5–1.05)
EGFRCR SERPLBLD CKD-EPI 2021: 18 ML/MIN/1.73M*2
EJECTION FRACTION APICAL 4 CHAMBER: 30.1
EJECTION FRACTION: 48 %
ERYTHROCYTE [DISTWIDTH] IN BLOOD BY AUTOMATED COUNT: 15.4 % (ref 11.5–14.5)
GLUCOSE SERPL-MCNC: 320 MG/DL (ref 74–99)
HCT VFR BLD AUTO: 29.2 % (ref 36–46)
HCT VFR BLD AUTO: 30.2 % (ref 36–46)
HDLC SERPL-MCNC: 49.2 MG/DL
HGB BLD-MCNC: 8.4 G/DL (ref 12–16)
HGB BLD-MCNC: 9.1 G/DL (ref 12–16)
HOLD SPECIMEN: NORMAL
LDLC SERPL CALC-MCNC: 74 MG/DL
LEFT ATRIUM VOLUME AREA LENGTH INDEX BSA: 23.7 ML/M2
LEFT VENTRICLE INTERNAL DIMENSION DIASTOLE: 3.77 CM (ref 3.5–6)
MCH RBC QN AUTO: 22.9 PG (ref 26–34)
MCHC RBC AUTO-ENTMCNC: 30.1 G/DL (ref 32–36)
MCV RBC AUTO: 76 FL (ref 80–100)
MITRAL VALVE E/A RATIO: 0.48
NON HDL CHOLESTEROL: 90 MG/DL (ref 0–149)
NRBC BLD-RTO: 0 /100 WBCS (ref 0–0)
P AXIS: 74 DEGREES
P OFFSET: 164 MS
P ONSET: 123 MS
PLATELET # BLD AUTO: 193 X10*3/UL (ref 150–450)
POTASSIUM SERPL-SCNC: 4.5 MMOL/L (ref 3.5–5.3)
PR INTERVAL: 142 MS
Q ONSET: 194 MS
QRS COUNT: 18 BEATS
QRS DURATION: 140 MS
QT INTERVAL: 370 MS
QTC CALCULATION(BAZETT): 500 MS
QTC FREDERICIA: 453 MS
R AXIS: 54 DEGREES
RBC # BLD AUTO: 3.97 X10*6/UL (ref 4–5.2)
RIGHT VENTRICLE PEAK SYSTOLIC PRESSURE: 33 MMHG
SODIUM SERPL-SCNC: 133 MMOL/L (ref 136–145)
T AXIS: 189 DEGREES
T OFFSET: 379 MS
TRICUSPID ANNULAR PLANE SYSTOLIC EXCURSION: 1.7 CM
TRIGL SERPL-MCNC: 81 MG/DL (ref 0–149)
UFH PPP CHRO-ACNC: 0.5 IU/ML (ref ?–1.1)
UFH PPP CHRO-ACNC: 0.5 IU/ML (ref ?–1.1)
VENTRICULAR RATE: 110 BPM
VLDL: 16 MG/DL (ref 0–40)
WBC # BLD AUTO: 7.9 X10*3/UL (ref 4.4–11.3)

## 2025-06-11 PROCEDURE — 84484 ASSAY OF TROPONIN QUANT: CPT | Performed by: INTERNAL MEDICINE

## 2025-06-11 PROCEDURE — 93306 TTE W/DOPPLER COMPLETE: CPT | Performed by: INTERNAL MEDICINE

## 2025-06-11 PROCEDURE — 93306 TTE W/DOPPLER COMPLETE: CPT

## 2025-06-11 PROCEDURE — 2500000005 HC RX 250 GENERAL PHARMACY W/O HCPCS: Performed by: FAMILY MEDICINE

## 2025-06-11 PROCEDURE — 2500000004 HC RX 250 GENERAL PHARMACY W/ HCPCS (ALT 636 FOR OP/ED): Mod: JZ | Performed by: NURSE PRACTITIONER

## 2025-06-11 PROCEDURE — 36415 COLL VENOUS BLD VENIPUNCTURE: CPT | Performed by: NURSE PRACTITIONER

## 2025-06-11 PROCEDURE — 2500000001 HC RX 250 WO HCPCS SELF ADMINISTERED DRUGS (ALT 637 FOR MEDICARE OP): Performed by: NURSE PRACTITIONER

## 2025-06-11 PROCEDURE — 80048 BASIC METABOLIC PNL TOTAL CA: CPT | Performed by: FAMILY MEDICINE

## 2025-06-11 PROCEDURE — 99233 SBSQ HOSP IP/OBS HIGH 50: CPT | Performed by: INTERNAL MEDICINE

## 2025-06-11 PROCEDURE — 85018 HEMOGLOBIN: CPT | Performed by: NURSE PRACTITIONER

## 2025-06-11 PROCEDURE — 2500000005 HC RX 250 GENERAL PHARMACY W/O HCPCS: Performed by: NURSE PRACTITIONER

## 2025-06-11 PROCEDURE — 82105 ALPHA-FETOPROTEIN SERUM: CPT | Mod: AHULAB

## 2025-06-11 PROCEDURE — 80061 LIPID PANEL: CPT | Performed by: NURSE PRACTITIONER

## 2025-06-11 PROCEDURE — 2500000002 HC RX 250 W HCPCS SELF ADMINISTERED DRUGS (ALT 637 FOR MEDICARE OP, ALT 636 FOR OP/ED): Performed by: NURSE PRACTITIONER

## 2025-06-11 PROCEDURE — 94640 AIRWAY INHALATION TREATMENT: CPT

## 2025-06-11 PROCEDURE — 93005 ELECTROCARDIOGRAM TRACING: CPT

## 2025-06-11 PROCEDURE — 85520 HEPARIN ASSAY: CPT | Performed by: NURSE PRACTITIONER

## 2025-06-11 PROCEDURE — 85027 COMPLETE CBC AUTOMATED: CPT | Performed by: FAMILY MEDICINE

## 2025-06-11 PROCEDURE — 1200000002 HC GENERAL ROOM WITH TELEMETRY DAILY

## 2025-06-11 RX ADMIN — IPRATROPIUM BROMIDE AND ALBUTEROL SULFATE 3 ML: 2.5; .5 SOLUTION RESPIRATORY (INHALATION) at 07:14

## 2025-06-11 RX ADMIN — SUCRALFATE 1 G: 1 TABLET ORAL at 08:41

## 2025-06-11 RX ADMIN — Medication 1 L/MIN: at 18:52

## 2025-06-11 RX ADMIN — METHYLPREDNISOLONE SODIUM SUCCINATE 40 MG: 40 INJECTION, POWDER, FOR SOLUTION INTRAMUSCULAR; INTRAVENOUS at 13:53

## 2025-06-11 RX ADMIN — PANTOPRAZOLE SODIUM 40 MG: 40 TABLET, DELAYED RELEASE ORAL at 06:23

## 2025-06-11 RX ADMIN — Medication 9 MG: at 20:38

## 2025-06-11 RX ADMIN — METHYLPREDNISOLONE SODIUM SUCCINATE 40 MG: 40 INJECTION, POWDER, FOR SOLUTION INTRAMUSCULAR; INTRAVENOUS at 21:46

## 2025-06-11 RX ADMIN — Medication 1 L/MIN: at 12:01

## 2025-06-11 RX ADMIN — ACETAMINOPHEN 650 MG: 325 TABLET, FILM COATED ORAL at 18:12

## 2025-06-11 RX ADMIN — SUCRALFATE 1 G: 1 TABLET ORAL at 16:41

## 2025-06-11 RX ADMIN — IPRATROPIUM BROMIDE AND ALBUTEROL SULFATE 3 ML: 2.5; .5 SOLUTION RESPIRATORY (INHALATION) at 18:52

## 2025-06-11 RX ADMIN — SUCRALFATE 1 G: 1 TABLET ORAL at 11:03

## 2025-06-11 RX ADMIN — METHYLPREDNISOLONE SODIUM SUCCINATE 40 MG: 40 INJECTION, POWDER, FOR SOLUTION INTRAMUSCULAR; INTRAVENOUS at 08:41

## 2025-06-11 RX ADMIN — METOPROLOL SUCCINATE 25 MG: 25 TABLET, EXTENDED RELEASE ORAL at 11:03

## 2025-06-11 RX ADMIN — IPRATROPIUM BROMIDE AND ALBUTEROL SULFATE 3 ML: 2.5; .5 SOLUTION RESPIRATORY (INHALATION) at 12:01

## 2025-06-11 RX ADMIN — SUCRALFATE 1 G: 1 TABLET ORAL at 23:00

## 2025-06-11 ASSESSMENT — COGNITIVE AND FUNCTIONAL STATUS - GENERAL
TOILETING: A LITTLE
MOBILITY SCORE: 22
DAILY ACTIVITIY SCORE: 23
DAILY ACTIVITIY SCORE: 24
CLIMB 3 TO 5 STEPS WITH RAILING: A LITTLE
WALKING IN HOSPITAL ROOM: A LITTLE
MOBILITY SCORE: 24

## 2025-06-11 ASSESSMENT — PAIN SCALES - GENERAL
PAINLEVEL_OUTOF10: 0 - NO PAIN
PAINLEVEL_OUTOF10: 3
PAINLEVEL_OUTOF10: 0 - NO PAIN
PAINLEVEL_OUTOF10: 0 - NO PAIN

## 2025-06-11 ASSESSMENT — PAIN - FUNCTIONAL ASSESSMENT
PAIN_FUNCTIONAL_ASSESSMENT: 0-10

## 2025-06-11 ASSESSMENT — PAIN DESCRIPTION - LOCATION: LOCATION: HEAD

## 2025-06-11 ASSESSMENT — ACTIVITIES OF DAILY LIVING (ADL): LACK_OF_TRANSPORTATION: NO

## 2025-06-11 NOTE — CARE PLAN
The patient's goals for the shift include      The clinical goals for the shift include pt will remain free of chest pain      Problem: Pain - Adult  Goal: Verbalizes/displays adequate comfort level or baseline comfort level  Outcome: Progressing     Problem: Safety - Adult  Goal: Free from fall injury  Outcome: Progressing     Problem: Discharge Planning  Goal: Discharge to home or other facility with appropriate resources  Outcome: Progressing     Problem: Chronic Conditions and Co-morbidities  Goal: Patient's chronic conditions and co-morbidity symptoms are monitored and maintained or improved  Outcome: Progressing     Problem: Nutrition  Goal: Nutrient intake appropriate for maintaining nutritional needs  Outcome: Progressing

## 2025-06-11 NOTE — CARE PLAN
The patient's goals for the shift include      The clinical goals for the shift include pt will remain hds  Free from falls and injury

## 2025-06-11 NOTE — H&P
History Of Present Illness  Maci Catalan is a 89 y.o. female presenting with complaint of cough and congestion and sort throat for past few days. No fever no chest pain no SOB. Was brought to ER work up noted felt to have bronchitis. Labs noted for Her troponin is slowly uptrending. She is denying chest pain. EKG is nonischemic. She was started on a heparin infusion for NSTEMI. Pt admitted for further care .     Past Medical History  She has a past medical history of Personal history of other diseases of the circulatory system and Personal history of other endocrine, nutritional and metabolic disease.    Surgical History  She has a past surgical history that includes Hysterectomy (04/17/2018); Other surgical history (06/19/2018); Cholecystectomy (06/19/2018); Other surgical history (06/21/2019); Other surgical history (06/21/2019); CT angio abdomen pelvis w and or wo IV IV contrast (8/16/2019); CT angio aorta and bilateral iliofemoral runoff including without contrast if performed (11/7/2019); IR angiogram aorta abdomen (11/6/2019); and CT angio abdomen pelvis w and or wo IV IV contrast (10/16/2022).     Social History  She reports that she quit smoking about 8 years ago. Her smoking use included cigarettes. She has been exposed to tobacco smoke. She has never used smokeless tobacco. She reports that she does not drink alcohol and does not use drugs.    Family History  Family History[1]     Allergies  Aspirin, Codeine, and Hydrocortisone    Review of Systems   As above     Physical Exam   Well developed well nurished  No distress  Ao  Face symmetrical   Neck no jvd no bruit  Chest clear  CVS regular  Ext no edema  Abdo soft nontender bs active, no masses  Cns alert appropriate able to move ext   Skin intact  Psych normal affect         General: Well appearing. No acute distress.   Eyes:  Extraocular movements grossly intact. No scleral icterus. No discharge  HEENT:  Normocephalic.  Atraumatic  Neck: Moves  neck freely. No gross masses  CV: Regular rhythm. No murmurs, rubs or gallops   Resp: Expiratory wheezing bilaterally.  No conversational dyspnea or accessory muscle use  GI: Soft, no masses, nontender. No rebound tenderness or guarding  MSK: Symmetric muscle bulk. No deformities. No lower extremity edema.    Skin: Warm, dry, intact.   Neuro: No focal deficits.  A&O x3.   Psych: Appropriate for situation     Last Recorded Vitals  /56 (BP Location: Left arm, Patient Position: Lying)   Pulse 90   Temp 36.4 °C (97.5 °F) (Temporal)   Resp 17   Wt (!) 43.1 kg (95 lb)   SpO2 95%     Relevant Results    Medications Ordered Prior to Encounter[2]  Results for orders placed or performed during the hospital encounter of 06/10/25 (from the past 24 hours)   ECG 12 lead   Result Value Ref Range    Ventricular Rate 110 BPM    Atrial Rate 110 BPM    IA Interval 142 ms    QRS Duration 140 ms    QT Interval 370 ms    QTC Calculation(Bazett) 500 ms    P Axis 74 degrees    R Axis 54 degrees    T Axis 189 degrees    QRS Count 18 beats    Q Onset 194 ms    P Onset 123 ms    P Offset 164 ms    T Offset 379 ms    QTC Fredericia 453 ms   Troponin I, High Sensitivity   Result Value Ref Range    Troponin I, High Sensitivity 81 (HH) 0 - 13 ng/L   Troponin I, High Sensitivity   Result Value Ref Range    Troponin I, High Sensitivity 142 (HH) 0 - 13 ng/L   aPTT - baseline   Result Value Ref Range    aPTT 30 26 - 36 seconds   Heparin Assay, UFH   Result Value Ref Range    Heparin Unfractionated 0.6 See Comment Below for Therapeutic Ranges IU/mL   Heparin Assay, UFH   Result Value Ref Range    Heparin Unfractionated 0.5 See Comment Below for Therapeutic Ranges IU/mL   Lipid Panel   Result Value Ref Range    Cholesterol 139 0 - 199 mg/dL    HDL-Cholesterol 49.2 mg/dL    Cholesterol/HDL Ratio 2.8     LDL Calculated 74 <=99 mg/dL    VLDL 16 0 - 40 mg/dL    Triglycerides 81 0 - 149 mg/dL    Non HDL Cholesterol 90 0 - 149 mg/dL   Troponin I,  High Sensitivity   Result Value Ref Range    Troponin I, High Sensitivity 245 (HH) 0 - 13 ng/L   Basic Metabolic Panel   Result Value Ref Range    Glucose 320 (H) 74 - 99 mg/dL    Sodium 133 (L) 136 - 145 mmol/L    Potassium 4.5 3.5 - 5.3 mmol/L    Chloride 102 98 - 107 mmol/L    Bicarbonate 20 (L) 21 - 32 mmol/L    Anion Gap 16 10 - 20 mmol/L    Urea Nitrogen 36 (H) 6 - 23 mg/dL    Creatinine 2.51 (H) 0.50 - 1.05 mg/dL    eGFR 18 (L) >60 mL/min/1.73m*2    Calcium 7.4 (L) 8.6 - 10.3 mg/dL   Lavender Top   Result Value Ref Range    Extra Tube Hold for add-ons.    Heparin Assay, UFH   Result Value Ref Range    Heparin Unfractionated 0.5 See Comment Below for Therapeutic Ranges IU/mL   Troponin I, High Sensitivity   Result Value Ref Range    Troponin I, High Sensitivity 196 (HH) 0 - 13 ng/L   CBC   Result Value Ref Range    WBC 7.9 4.4 - 11.3 x10*3/uL    nRBC 0.0 0.0 - 0.0 /100 WBCs    RBC 3.97 (L) 4.00 - 5.20 x10*6/uL    Hemoglobin 9.1 (L) 12.0 - 16.0 g/dL    Hematocrit 30.2 (L) 36.0 - 46.0 %    MCV 76 (L) 80 - 100 fL    MCH 22.9 (L) 26.0 - 34.0 pg    MCHC 30.1 (L) 32.0 - 36.0 g/dL    RDW 15.4 (H) 11.5 - 14.5 %    Platelets 193 150 - 450 x10*3/uL   Transthoracic Echo Complete   Result Value Ref Range    BSA 1.35 m2               Assessment/Plan   Assessment & Plan  Elevated troponin   HTN, uncontrolled   -Consult cardiology. Cont heparin gtt  -Tele  -Check Echo      COPD Exac  Ac Bronchitis   -Cont steroids, aerosols, supportiv care    PTOT    -Continue current treatment as ordered. Will make adjustments as necessary.    .Plan of care discussed with: Provider, RN, Patient.         I have reviewed the above note obtained and documented by the NP/PA and I personally participated in the key components. I have discussed the case and management of the patient's care. Changes made to the note, and all key components of history and physical/progress note done by me.  dw nursing  Jacky Hendrix MD           [1]   Family  History  Problem Relation Name Age of Onset    Hyperlipidemia Mother      Hyperlipidemia Father     [2]   No current facility-administered medications on file prior to encounter.     Current Outpatient Medications on File Prior to Encounter   Medication Sig Dispense Refill    acetaminophen (Tylenol) 325 mg tablet Take 2 tablets (650 mg) by mouth every 4 hours if needed for mild pain (1 - 3) or moderate pain (4 - 6).      empagliflozin (Jardiance) 10 mg Take 1 tablet (10 mg) by mouth once daily. 90 tablet 3    famotidine (Pepcid) 20 mg tablet Take 1 tablet (20 mg) by mouth once daily.      furosemide (Lasix) 40 mg tablet Take 1 tablet (40 mg) by mouth once daily. Take with Meals 100 tablet 3    losartan (Cozaar) 50 mg tablet Take 1 tablet (50 mg) by mouth once daily. 90 tablet 3    melatonin 10 mg tablet Take 1 tablet (10 mg) by mouth once daily at bedtime.      metFORMIN (Glucophage) 500 mg tablet Take 1 tablet (500 mg) by mouth every 12 hours. With food      metoprolol succinate XL (Toprol-XL) 25 mg 24 hr tablet Take 1 tablet (25 mg) by mouth once daily. Do not crush or chew. 90 tablet 3    sucralfate (Carafate) 1 gram tablet Take 1 tablet (1 g) by mouth 4 times a day.      nitroglycerin (Nitrostat) 0.4 mg SL tablet Place 1 tablet (0.4 mg) under the tongue every 5 minutes if needed for chest pain (UP TO 3 DOSES AS NEEDED FOR CHEST PAIN.CALL 911 IF PAIN PERSISTS.).

## 2025-06-11 NOTE — PROGRESS NOTES
Subjective Data:  Patient seen and examined, chart reviewed   -- HS -JOCELYN remains elevated, but probably reflective of CKD, baseline ischemic cardiomyopathy   [facility]     Overnight Events:    None     Objective Data:  Last Recorded Vitals:  Vitals:    06/11/25 1020 06/11/25 1201 06/11/25 1203 06/11/25 1540   BP: 125/56  117/63 116/55   BP Location: Left arm  Left arm Left arm   Patient Position: Lying  Lying Lying   Pulse: 90  92 89   Resp:   17 17   Temp:   36.3 °C (97.3 °F) 36.6 °C (97.9 °F)   TempSrc:   Temporal Temporal   SpO2:  94% 92% 92%   Weight:       Height:           Last Labs:  Results from last 7 days   Lab Units 06/11/25  1512 06/11/25  1010 06/10/25  1009   WBC AUTO x10*3/uL  --  7.9 6.5   HEMOGLOBIN g/dL 8.4* 9.1* 10.0*   HEMATOCRIT % 29.2* 30.2* 33.9*   PLATELETS AUTO x10*3/uL  --  193 208     Results from last 7 days   Lab Units 06/11/25  0437 06/10/25  1009   SODIUM mmol/L 133* 139   POTASSIUM mmol/L 4.5 4.0   CHLORIDE mmol/L 102 106   CO2 mmol/L 20* 20*   BUN mg/dL 36* 34*   CREATININE mg/dL 2.51* 2.62*   CALCIUM mg/dL 7.4* 7.5*   PROTEIN TOTAL g/dL  --  6.4   BILIRUBIN TOTAL mg/dL  --  0.3   ALK PHOS U/L  --  157*   ALT U/L  --  10   AST U/L  --  13   GLUCOSE mg/dL 320* 192*     Results from last 7 days   Lab Units 06/11/25  1458 06/11/25  1010 06/11/25  0437 06/10/25  1406 06/10/25  1225 06/10/25  1101   TROPHS ng/L 166* 196* 245* 142* 81* 54*             Last I/O:  I/O last 3 completed shifts:  In: 240 (5.6 mL/kg) [P.O.:240]  Out: - (0 mL/kg)   Weight: 43.1 kg     Past Cardiology Tests (Last 3 Years):  EKG:  ECG 12 lead 06/10/2025 (Preliminary)      ECG 12 lead (Clinic Performed) 04/08/2025      ECG 12 lead (Clinic Performed) 03/03/2025      ECG 12 lead (Clinic Performed) 01/28/2025      ECG 12 lead (Clinic Performed) 09/11/2024      ECG 12 Lead 07/04/2024      ECG 12 lead (Clinic Performed) 04/19/2024      ECG 12 lead (Ancillary Performed) 10/10/2023    Echo:    1. Left ventricular ejection fraction is mildly decreased by visual estimate at 45-50%.   2. There is global hypokinesis of the left ventricle with minor regional variations.   3. Spectral Doppler shows an abnormal pattern of left ventricular diastolic filling due to right ventricular pacing.   4. There is moderately increased septal thickness.   5. There is normal right ventricular global systolic function.   6. Small pericardial effusion.   7. There is no evidence of cardiac tamponade.   8. The Doppler estimated RVSP is mildly elevated at 33 mmHg.    Ejection Fractions:  EF   Date/Time Value Ref Range Status   06/11/2025 11:34 AM 48 %    02/11/2025 11:58 AM 35 %      Cath:  No results found for this or any previous visit from the past 1095 days.    Stress Test:  No results found for this or any previous visit from the past 1095 days.    Cardiac Imaging:  No results found for this or any previous visit from the past 1095 days.      Inpatient Medications:  Scheduled Medications[1]  PRN Medications[2]  Continuous Medications[3]    Physical Exam:  Documented Vital Signs   Heart Rate:  [70-93]   Temp:  [36.1 °C (97 °F)-37.1 °C (98.8 °F)]   Resp:  [17-18]   BP: (100-143)/(48-67)   SpO2:  [92 %-97 %]   Temp:  [36.1 °C (97 °F)-37.1 °C (98.8 °F)] 36.6 °C (97.9 °F)  Heart Rate:  [70-93] 89  Resp:  [17-18] 17  BP: (100-143)/(48-67) 116/55  Oxygen Dose: *1 L/min  Documented Fluid Status     Intake/Output Summary (Last 24 hours) at 6/11/2025 1645  Last data filed at 6/10/2025 2101  Gross per 24 hour   Intake 240 ml   Output --   Net 240 ml     Net IO Since Admission: 240 mL [06/11/25 1645]       Assessment/Plan   Maci Catalan is a 89 y.o. female who has a pertinent medical history including, Hx of Perioperative NSTEMI ( Occuring in setting of EVAR 11/2019, Troponin Peak 18.45 ) three-vessel coronary artery disease S/P PCI (11/22/2019) to proximal LAD with a 2.75 x 12 mm Resolute [place] drug-eluting stent postdilated up to  "3.5 mm ) Chronic Systolic Heart Failure with LVEF 35% ( 11/2019 ) with Mid and apical anterior septum, mid and apical inferior septum, and basal and mid inferior wall motion abnormalities, HX of AAA S/P EVAR 11/2019 ) Hx of Complete Heart Block S/P Medtronic Dual Chamber System, and Hx of Pancreatic Tumor S/P Whipple who presented to the Emergency department with complaints of Cough X 1 week.  Cardiology has been consulted for \"Elevated Troponin, Nstemi\"  A full hospital course review was completed.     States that she has been in her usual state of health up until about 1 week ago.  She had significant coughing, sneezing, runny nose.  She denies tightness of the chest, nor heaviness.  She notes that her rhinorrhea was so intense, that she used up an entire \"box of Kleenex in 1 day\".  Continuing to feel unwell, she presented to the emergency department for further evaluation.  On arrival, blood pressure is documented to be extreme At 245/96 with a heart rate of 107 respiratory rate 17 saturating 90% on room air.  Roughly 10 minutes later, blood pressure was down to 148 and has remained normotensive.  She was placed on 2 L of nasal cannula.  She reports that she is already feeling better.     # Elevated high-sensitivity cardiac troponin  -- Etiology unclear as to whether or not this is a true ACS event versus acute myocardial injury in the setting of viral illness. Her echocardiogram showed improved LVEF and interval development of a RV localized pericardial effusion. Suspicion for Pericarditis as culprit for troponin elevation and chest pain  -- For now, reasonable to continue heparin X 48 hours   -- Given GFR will need to hold off on usual treatment ( High dose Colchicine / High Dose NSAID ) for now  -- Ok to use PO Analagesia ( Oxycodone etc. ) for inpatient treatment            # Hypertensive urgency present on admission  -- Unclear if this is truly her blood pressure is documented or rather clerical entry issue. "  Historically, she has not had dramatically elevated blood pressures in the outpatient setting.  Her EMS records are not currently available to ascertain whether she was hypertensive on initial EMS encounter  -- For now, continue to trend.  Continue chronic medications.        Peripheral IV 06/10/25 22 G Right Antecubital (Active)   Site Assessment Clean;Dry;Intact 06/11/25 0849   Dressing Type Transparent 06/11/25 0849   Line Status Infusing 06/11/25 0849   Dressing Status Dry;Clean 06/11/25 0849   Number of days: 1       Peripheral IV 06/10/25 22 G Anterior;Left Forearm (Active)   Site Assessment Clean;Dry;Intact 06/11/25 0849   Dressing Type Transparent 06/11/25 0849   Line Status Flushed;Saline locked 06/11/25 0849   Dressing Status Clean;Dry 06/11/25 0849   Number of days: 1       Code Status:  Full Code      Ruben Sanchez DO   Division of Cardiovascular Medicine  [facility]               [1]   Scheduled medications   Medication Dose Route Frequency    ipratropium-albuteroL  3 mL nebulization TID    [Held by provider] losartan  50 mg oral Daily    melatonin  9 mg oral Nightly    methylPREDNISolone sodium succinate (PF)  40 mg intravenous q8h LISA    metoprolol succinate XL  25 mg oral Daily    pantoprazole  40 mg oral Daily before breakfast    polyethylene glycol  17 g oral Daily    sucralfate  1 g oral Before meals & nightly   [2]   PRN medications   Medication    acetaminophen    Or    acetaminophen    Or    acetaminophen    heparin    ipratropium-albuteroL    nitroglycerin    oxygen   [3]   Continuous Medications   Medication Dose Last Rate    heparin  0-4,000 Units/hr 500 Units/hr (06/11/25 0756)

## 2025-06-11 NOTE — CONSULTS
Inpatient consult to Nephrology  Consult performed by: Sixto Resendiz DO  Consult ordered by: Jacky Hendrix MD    Reason For Consult  Acute kidney injury, stage G3b chronic kidney disease.    History Of Present Illness  Maci Catalan is a 89 y.o. female with a past medical history of NSTEMI, AAA  status post EVAR 11/2019, 3v coronary artery disease status post PCI with drug-eluting stent placement in November 2019 with an LVEF of 35%,, HFrEF, complete heart block status post Medtronic dual-chamber system, history of pancreatic tumor status post Whipple procedure who presented with flulike symptoms with cough and shortness of breath.  In the ED, she was found to be hypoxic to 90% on room air.  Was placed on nasal cannula.  She was noted to have severe hypertension with a blood pressure of 245/96 mmHg.  Chest x-ray shows COPD/emphysematous changes with no acute pathology.  She was noted to have a creatinine of 2.62 mg/dL worsened from 1.37 mg/dL in July 2024 thus nephrology is consulted.    Medical History[1]    Surgical History[2]    Social History[3]     Family History  Family History[4]     Allergies  Aspirin, Codeine, and Hydrocortisone    Review of Systems    10 point review of systems including (Constitutional, Eyes, ENMT, Respiratory, Cardiac, Gastrointestinal, Neurological, Psychiatric, and Hematologic) was performed and is otherwise negative.     Physical Exam   General:  Patient is awake, alert, and oriented.  Patient is in no acute distress.  HEENT:  Pupils equal and reactive.  Normocephalic.  Moist mucosa.    Neck:  No thyromegaly.  Normal Jugular Venous Pressure.  Cardiovascular:  Regular rate and rhythm.  Normal S1 and S2.  1/6 GLORIA.  Pulmonary:  Clear to auscultation bilaterally.  Abdomen:  Soft. Non-tender.   Non-distended.  Positive bowel sounds.  Lower Extremities:  2+ pedal pulses. No LE edema.  Neurologic:  Cranial nerves intact.  No focal deficit.   Skin: Skin warm and dry, normal skin turgor.    Psychiatric: Normal affect.       I&O 24HR    Intake/Output Summary (Last 24 hours) at 6/11/2025 1816  Last data filed at 6/10/2025 2101  Gross per 24 hour   Intake 240 ml   Output --   Net 240 ml       Vitals 24HR  Heart Rate:  [70-92]   Temp:  [36.1 °C (97 °F)-37.1 °C (98.8 °F)]   Resp:  [17-18]   BP: (100-135)/(48-67)   SpO2:  [92 %-97 %]     Scheduled Medications  Scheduled Medications[5]  Continuous medications  Continuous Medications[6]    PRN Medications[7]     Relevant Results  Results from last 7 days   Lab Units 06/11/25  1512 06/11/25  1010 06/10/25  1009   WBC AUTO x10*3/uL  --  7.9 6.5   HEMOGLOBIN g/dL 8.4* 9.1* 10.0*   HEMATOCRIT % 29.2* 30.2* 33.9*   PLATELETS AUTO x10*3/uL  --  193 208   NEUTROS PCT AUTO %  --   --  80.1   LYMPHS PCT AUTO %  --   --  10.6   MONOS PCT AUTO %  --   --  6.5   EOS PCT AUTO %  --   --  2.0      Results from last 7 days   Lab Units 06/11/25  0437 06/10/25  1009   SODIUM mmol/L 133* 139   POTASSIUM mmol/L 4.5 4.0   CHLORIDE mmol/L 102 106   CO2 mmol/L 20* 20*   BUN mg/dL 36* 34*   CREATININE mg/dL 2.51* 2.62*   CALCIUM mg/dL 7.4* 7.5*   PROTEIN TOTAL g/dL  --  6.4   BILIRUBIN TOTAL mg/dL  --  0.3   ALK PHOS U/L  --  157*   ALT U/L  --  10   AST U/L  --  13   GLUCOSE mg/dL 320* 192*      Transthoracic Echo Complete   Final Result      XR chest 1 view   Final Result   Stable cardiac pacemaker on the left.        Findings of underlying COPD and emphysema.        No significant or acute interval change.        MACRO:   None        Signed by: Dillon Catalan 6/10/2025 11:33 AM   Dictation workstation:   [id number]            Assessment/Plan   Maci Catalan is a 89 y.o. female with a past medical history of NSTEMI, AAA  status post EVAR 11/2019, 3v coronary artery disease status post PCI with drug-eluting stent placement in November 2019 with an LVEF of 35%,, HFrEF, complete heart block status post Medtronic dual-chamber system, history of pancreatic tumor status post  Whipple procedure who presented with flulike symptoms with cough and shortness of breath.  In the ED, she was found to be hypoxic to 90% on room air.  Was placed on nasal cannula.  She was noted to have severe hypertension with a blood pressure of 245/96 mmHg.  Chest x-ray shows COPD/emphysematous changes with no acute pathology.  She was noted to have a creatinine of 2.62 mg/dL worsened from 1.37 mg/dL in July 2024 thus nephrology is consulted.    Ms. Catalan has a history of stage G3b chronic kidney disease with a baseline creatinine of 1.3 presumably up to 1.8 mg/deciliter as of last July.  She had an acute kidney injury back then with her last creatinine settling at 1.37.  Since that time, she was noted to have a drop in EF back down to 35% in February of this year.  Following this, she was placed on losartan 50 mg from low-dose ACE inhibition with lisinopril 2.5 mg daily.  She was also added on Jardiance.  There was no repeat creatinine up until this admission.  She possibly may have an acute kidney injury versus progressive kidney disease in the setting of a reduced EF +/- her medication adjustment with higher dose RAAS blockade and an SGLT2 inhibitor.  I favor the later.  There is no clear history to support an acute kidney injury.  She denies nausea, emesis or diarrhea.  No anti-inflammatory use.  No further medication and/or nephrotoxin exposure. I will obtain a renal ultrasound, a urinalysis, urine indices and proteinuria quantification.  I will check a vitamin D, intact PTH, iron stores ferritin.  Trend her RFP.  Will follow with her care.    Assessment & Plan  Elevated troponin    Acute bronchitis, unspecified organism      I spent 65 minutes in the professional and overall care of this patient.      Sixto Resendiz DO       [1]   Past Medical History:  Diagnosis Date    Personal history of other diseases of the circulatory system     History of hypertension    Personal history of other endocrine,  nutritional and metabolic disease     History of high cholesterol   [2]   Past Surgical History:  Procedure Laterality Date    CHOLECYSTECTOMY  2018    Cholecystectomy    CT ABDOMEN PELVIS ANGIOGRAM W AND/OR WO IV CONTRAST  2019    CT ABDOMEN PELVIS ANGIOGRAM W AND/OR WO IV CONTRAST 2019 [facility] ANCILLARY LEGACY    CT ABDOMEN PELVIS ANGIOGRAM W AND/OR WO IV CONTRAST  10/16/2022    CT ABDOMEN PELVIS ANGIOGRAM W AND/OR WO IV CONTRAST 10/16/2022 [facility] EMERGENCY LEGACY    CT ANGIO AORTA AND BILATERAL ILIOFEMORAL RUN OFF INCLUDING WITHOUT CONTRAST IF PERFORMED  2019    CT AORTA AND BILATERAL ILIOFEMORAL RUNOFF ANGIOGRAM W AND/OR WO IV CONTRAST 2019 [facility] INPATIENT LEGACY    HYSTERECTOMY  2018    Hysterectomy    IR ANGIOGRAM AORTA ABDOMEN  2019    IR ANGIOGRAM AORTA ABDOMEN 2019 [facility] INPATIENT LEGACY    OTHER SURGICAL HISTORY  2018    Proximal Subtotal Pancreatectomy (Whipple Procedure)    OTHER SURGICAL HISTORY  2019    Pacemaker insertion    OTHER SURGICAL HISTORY  2019    Appendectomy laparoscopic   [3]   Social History  Tobacco Use    Smoking status: Former     Current packs/day: 0.00     Types: Cigarettes     Quit date:      Years since quittin.4     Passive exposure: Past    Smokeless tobacco: Never   Vaping Use    Vaping status: Never Used   Substance Use Topics    Alcohol use: Never    Drug use: Never   [4]   Family History  Problem Relation Name Age of Onset    Hyperlipidemia Mother      Hyperlipidemia Father     [5] ipratropium-albuteroL, 3 mL, nebulization, TID  [Held by provider] losartan, 50 mg, oral, Daily  melatonin, 9 mg, oral, Nightly  methylPREDNISolone sodium succinate (PF), 40 mg, intravenous, q8h LISA  metoprolol succinate XL, 25 mg, oral, Daily  pantoprazole, 40 mg, oral, Daily before breakfast  polyethylene glycol, 17 g, oral, Daily  sucralfate, 1 g, oral, Before meals & nightly  [6] heparin, 0-4,000 Units/hr, Last Rate: 500 Units/hr  (06/11/25 0756)  [7] PRN medications: acetaminophen **OR** acetaminophen **OR** acetaminophen, heparin, ipratropium-albuteroL, nitroglycerin, oxygen

## 2025-06-11 NOTE — PROGRESS NOTES
06/11/25 1516   Discharge Planning   Living Arrangements Alone   Support Systems Children;Friends/neighbors   Type of Residence Private residence   Home or Post Acute Services In home services   Type of Home Care Services Home OT;Home PT   Expected Discharge Disposition Home H   Does the patient need discharge transport arranged? Yes   RoundTrip coordination needed? Yes   Financial Resource Strain   How hard is it for you to pay for the very basics like food, housing, medical care, and heating? Not hard   Housing Stability   In the last 12 months, was there a time when you were not able to pay the mortgage or rent on time? N   At any time in the past 12 months, were you homeless or living in a shelter (including now)? N   Transportation Needs   In the past 12 months, has lack of transportation kept you from medical appointments or from getting medications? no   In the past 12 months, has lack of transportation kept you from meetings, work, or from getting things needed for daily living? No   Patient Choice   Provider Choice list and CMS website (https://medicare.gov/care-compare#search) for post-acute Quality and Resource Measure Data were provided and reviewed with: Patient     NO H&P to review.  Patient lives home alone.  She would like to return home.  Solumedrol and duo nebs ordered.  Cardio following.  Was active with [facility] last July.  Will continue to follow for discharge planning needs.

## 2025-06-12 ENCOUNTER — APPOINTMENT (OUTPATIENT)
Dept: RADIOLOGY | Facility: HOSPITAL | Age: OVER 89
DRG: 280 | End: 2025-06-12
Payer: MEDICARE

## 2025-06-12 PROBLEM — R73.9 HYPERGLYCEMIA: Status: ACTIVE | Noted: 2025-06-12

## 2025-06-12 LAB
ANION GAP SERPL CALC-SCNC: 16 MMOL/L (ref 10–20)
APPEARANCE UR: ABNORMAL
ATRIAL RATE: 95 BPM
BACTERIA #/AREA URNS AUTO: ABNORMAL /HPF
BILIRUB UR STRIP.AUTO-MCNC: NEGATIVE MG/DL
BUN SERPL-MCNC: 44 MG/DL (ref 6–23)
CALCIUM SERPL-MCNC: 8.1 MG/DL (ref 8.6–10.3)
CHLORIDE SERPL-SCNC: 102 MMOL/L (ref 98–107)
CO2 SERPL-SCNC: 18 MMOL/L (ref 21–32)
COLOR UR: ABNORMAL
CREAT SERPL-MCNC: 2.55 MG/DL (ref 0.5–1.05)
CREAT UR-MCNC: 77.1 MG/DL (ref 20–320)
EGFRCR SERPLBLD CKD-EPI 2021: 18 ML/MIN/1.73M*2
ERYTHROCYTE [DISTWIDTH] IN BLOOD BY AUTOMATED COUNT: 15.3 % (ref 11.5–14.5)
GLUCOSE BLD MANUAL STRIP-MCNC: 193 MG/DL (ref 74–99)
GLUCOSE BLD MANUAL STRIP-MCNC: 403 MG/DL (ref 74–99)
GLUCOSE SERPL-MCNC: 425 MG/DL (ref 74–99)
GLUCOSE UR STRIP.AUTO-MCNC: ABNORMAL MG/DL
HCT VFR BLD AUTO: 25.3 % (ref 36–46)
HGB BLD-MCNC: 7.9 G/DL (ref 12–16)
KETONES UR STRIP.AUTO-MCNC: NEGATIVE MG/DL
LEUKOCYTE ESTERASE UR QL STRIP.AUTO: ABNORMAL
MCH RBC QN AUTO: 23 PG (ref 26–34)
MCHC RBC AUTO-ENTMCNC: 31.2 G/DL (ref 32–36)
MCV RBC AUTO: 74 FL (ref 80–100)
MUCOUS THREADS #/AREA URNS AUTO: ABNORMAL /LPF
NITRITE UR QL STRIP.AUTO: NEGATIVE
NRBC BLD-RTO: 0 /100 WBCS (ref 0–0)
P AXIS: 74 DEGREES
P OFFSET: 167 MS
P ONSET: 112 MS
PH UR STRIP.AUTO: 5.5 [PH]
PLATELET # BLD AUTO: 196 X10*3/UL (ref 150–450)
POTASSIUM SERPL-SCNC: 4.2 MMOL/L (ref 3.5–5.3)
PR INTERVAL: 158 MS
PROT UR STRIP.AUTO-MCNC: ABNORMAL MG/DL
Q ONSET: 191 MS
QRS COUNT: 15 BEATS
QRS DURATION: 154 MS
QT INTERVAL: 426 MS
QTC CALCULATION(BAZETT): 535 MS
QTC FREDERICIA: 496 MS
R AXIS: 4 DEGREES
RBC # BLD AUTO: 3.44 X10*6/UL (ref 4–5.2)
RBC # UR STRIP.AUTO: ABNORMAL MG/DL
RBC #/AREA URNS AUTO: ABNORMAL /HPF
SODIUM SERPL-SCNC: 132 MMOL/L (ref 136–145)
SODIUM UR-SCNC: 29 MMOL/L
SODIUM/CREAT UR-RTO: 38 MMOL/G CREAT
SP GR UR STRIP.AUTO: 1.01
SQUAMOUS #/AREA URNS AUTO: ABNORMAL /HPF
T AXIS: 106 DEGREES
T OFFSET: 404 MS
UFH PPP CHRO-ACNC: 0.5 IU/ML (ref ?–1.1)
UROBILINOGEN UR STRIP.AUTO-MCNC: NORMAL MG/DL
VENTRICULAR RATE: 95 BPM
WBC # BLD AUTO: 11.5 X10*3/UL (ref 4.4–11.3)
WBC #/AREA URNS AUTO: >50 /HPF

## 2025-06-12 PROCEDURE — 36415 COLL VENOUS BLD VENIPUNCTURE: CPT | Performed by: NURSE PRACTITIONER

## 2025-06-12 PROCEDURE — 2500000002 HC RX 250 W HCPCS SELF ADMINISTERED DRUGS (ALT 637 FOR MEDICARE OP, ALT 636 FOR OP/ED): Performed by: FAMILY MEDICINE

## 2025-06-12 PROCEDURE — 80048 BASIC METABOLIC PNL TOTAL CA: CPT | Performed by: FAMILY MEDICINE

## 2025-06-12 PROCEDURE — 2500000004 HC RX 250 GENERAL PHARMACY W/ HCPCS (ALT 636 FOR OP/ED): Mod: JZ | Performed by: NURSE PRACTITIONER

## 2025-06-12 PROCEDURE — 2500000005 HC RX 250 GENERAL PHARMACY W/O HCPCS: Performed by: FAMILY MEDICINE

## 2025-06-12 PROCEDURE — 81001 URINALYSIS AUTO W/SCOPE: CPT | Performed by: INTERNAL MEDICINE

## 2025-06-12 PROCEDURE — 85027 COMPLETE CBC AUTOMATED: CPT | Performed by: NURSE PRACTITIONER

## 2025-06-12 PROCEDURE — 82306 VITAMIN D 25 HYDROXY: CPT | Mod: AHULAB | Performed by: INTERNAL MEDICINE

## 2025-06-12 PROCEDURE — 2500000001 HC RX 250 WO HCPCS SELF ADMINISTERED DRUGS (ALT 637 FOR MEDICARE OP)

## 2025-06-12 PROCEDURE — 2500000004 HC RX 250 GENERAL PHARMACY W/ HCPCS (ALT 636 FOR OP/ED): Performed by: INTERNAL MEDICINE

## 2025-06-12 PROCEDURE — 94640 AIRWAY INHALATION TREATMENT: CPT

## 2025-06-12 PROCEDURE — 82570 ASSAY OF URINE CREATININE: CPT | Performed by: INTERNAL MEDICINE

## 2025-06-12 PROCEDURE — 71250 CT THORAX DX C-: CPT

## 2025-06-12 PROCEDURE — 76770 US EXAM ABDO BACK WALL COMP: CPT | Performed by: RADIOLOGY

## 2025-06-12 PROCEDURE — 2500000004 HC RX 250 GENERAL PHARMACY W/ HCPCS (ALT 636 FOR OP/ED): Performed by: NURSE PRACTITIONER

## 2025-06-12 PROCEDURE — 85520 HEPARIN ASSAY: CPT | Performed by: NURSE PRACTITIONER

## 2025-06-12 PROCEDURE — 2500000002 HC RX 250 W HCPCS SELF ADMINISTERED DRUGS (ALT 637 FOR MEDICARE OP, ALT 636 FOR OP/ED): Performed by: NURSE PRACTITIONER

## 2025-06-12 PROCEDURE — 2500000001 HC RX 250 WO HCPCS SELF ADMINISTERED DRUGS (ALT 637 FOR MEDICARE OP): Performed by: NURSE PRACTITIONER

## 2025-06-12 PROCEDURE — 1200000002 HC GENERAL ROOM WITH TELEMETRY DAILY

## 2025-06-12 PROCEDURE — 82947 ASSAY GLUCOSE BLOOD QUANT: CPT

## 2025-06-12 PROCEDURE — 83540 ASSAY OF IRON: CPT | Performed by: INTERNAL MEDICINE

## 2025-06-12 PROCEDURE — 76770 US EXAM ABDO BACK WALL COMP: CPT

## 2025-06-12 PROCEDURE — 2500000005 HC RX 250 GENERAL PHARMACY W/O HCPCS: Performed by: NURSE PRACTITIONER

## 2025-06-12 PROCEDURE — 71250 CT THORAX DX C-: CPT | Performed by: RADIOLOGY

## 2025-06-12 RX ORDER — DOXYCYCLINE HYCLATE 100 MG
TABLET ORAL
Status: COMPLETED
Start: 2025-06-12 | End: 2025-06-12

## 2025-06-12 RX ORDER — GUAIFENESIN 100 MG/5ML
LIQUID ORAL
Status: COMPLETED
Start: 2025-06-12 | End: 2025-06-12

## 2025-06-12 RX ORDER — SODIUM BICARBONATE 650 MG/1
650 TABLET ORAL 2 TIMES DAILY
Status: DISCONTINUED | OUTPATIENT
Start: 2025-06-12 | End: 2025-06-17 | Stop reason: HOSPADM

## 2025-06-12 RX ORDER — GUAIFENESIN 100 MG/5ML
200 LIQUID ORAL EVERY 4 HOURS PRN
Status: DISCONTINUED | OUTPATIENT
Start: 2025-06-12 | End: 2025-06-17 | Stop reason: HOSPADM

## 2025-06-12 RX ORDER — DOXYCYCLINE HYCLATE 100 MG
100 TABLET ORAL EVERY 12 HOURS SCHEDULED
Status: DISCONTINUED | OUTPATIENT
Start: 2025-06-12 | End: 2025-06-13

## 2025-06-12 RX ORDER — INSULIN LISPRO 100 [IU]/ML
0-5 INJECTION, SOLUTION INTRAVENOUS; SUBCUTANEOUS
Status: DISCONTINUED | OUTPATIENT
Start: 2025-06-12 | End: 2025-06-15

## 2025-06-12 RX ADMIN — METOPROLOL SUCCINATE 25 MG: 25 TABLET, EXTENDED RELEASE ORAL at 08:42

## 2025-06-12 RX ADMIN — GUAIFENESIN 200 MG: 200 SOLUTION ORAL at 20:38

## 2025-06-12 RX ADMIN — IPRATROPIUM BROMIDE AND ALBUTEROL SULFATE 3 ML: 2.5; .5 SOLUTION RESPIRATORY (INHALATION) at 19:04

## 2025-06-12 RX ADMIN — Medication 1 L/MIN: at 19:04

## 2025-06-12 RX ADMIN — HEPARIN SODIUM 500 UNITS/HR: 10000 INJECTION, SOLUTION INTRAVENOUS at 11:27

## 2025-06-12 RX ADMIN — DOXYCYCLINE HYCLATE 100 MG: 100 TABLET, COATED ORAL at 20:38

## 2025-06-12 RX ADMIN — INSULIN LISPRO 5 UNITS: 100 INJECTION, SOLUTION INTRAVENOUS; SUBCUTANEOUS at 16:55

## 2025-06-12 RX ADMIN — SUCRALFATE 1 G: 1 TABLET ORAL at 08:42

## 2025-06-12 RX ADMIN — GUAIFENESIN 200 MG: 200 SOLUTION ORAL at 13:28

## 2025-06-12 RX ADMIN — PANTOPRAZOLE SODIUM 40 MG: 40 TABLET, DELAYED RELEASE ORAL at 08:42

## 2025-06-12 RX ADMIN — DOXYCYCLINE HYCLATE 100 MG: 100 TABLET, COATED ORAL at 13:28

## 2025-06-12 RX ADMIN — SODIUM BICARBONATE 650 MG: 650 TABLET ORAL at 20:39

## 2025-06-12 RX ADMIN — SUCRALFATE 1 G: 1 TABLET ORAL at 16:55

## 2025-06-12 RX ADMIN — IPRATROPIUM BROMIDE AND ALBUTEROL SULFATE 3 ML: 2.5; .5 SOLUTION RESPIRATORY (INHALATION) at 13:10

## 2025-06-12 RX ADMIN — SUCRALFATE 1 G: 1 TABLET ORAL at 12:34

## 2025-06-12 RX ADMIN — IPRATROPIUM BROMIDE AND ALBUTEROL SULFATE 3 ML: 2.5; .5 SOLUTION RESPIRATORY (INHALATION) at 07:17

## 2025-06-12 RX ADMIN — DOXYCYCLINE HYCLATE 100 MG: 100 TABLET, FILM COATED ORAL at 13:37

## 2025-06-12 RX ADMIN — METHYLPREDNISOLONE SODIUM SUCCINATE 40 MG: 40 INJECTION, POWDER, FOR SOLUTION INTRAMUSCULAR; INTRAVENOUS at 06:28

## 2025-06-12 RX ADMIN — GUAIFENESIN 200 MG: 100 SOLUTION ORAL at 13:36

## 2025-06-12 RX ADMIN — Medication 9 MG: at 20:39

## 2025-06-12 RX ADMIN — SODIUM BICARBONATE 650 MG: 650 TABLET ORAL at 13:28

## 2025-06-12 ASSESSMENT — COGNITIVE AND FUNCTIONAL STATUS - GENERAL
MOBILITY SCORE: 22
DAILY ACTIVITIY SCORE: 23
CLIMB 3 TO 5 STEPS WITH RAILING: A LITTLE
CLIMB 3 TO 5 STEPS WITH RAILING: A LITTLE
DRESSING REGULAR LOWER BODY CLOTHING: A LITTLE
WALKING IN HOSPITAL ROOM: A LITTLE
MOBILITY SCORE: 22
WALKING IN HOSPITAL ROOM: A LITTLE
DAILY ACTIVITIY SCORE: 24

## 2025-06-12 ASSESSMENT — PAIN SCALES - GENERAL
PAINLEVEL_OUTOF10: 0 - NO PAIN
PAINLEVEL_OUTOF10: 0 - NO PAIN

## 2025-06-12 NOTE — ASSESSMENT & PLAN NOTE
Persistant cough  Worse w po  Speech eval  Qtc noted >> doxycyline  Check ct chest  Anti tussives

## 2025-06-12 NOTE — CARE PLAN
The clinical goals for the shift include Remain HDS    Problem: Pain - Adult  Goal: Verbalizes/displays adequate comfort level or baseline comfort level  Outcome: Progressing     Problem: Safety - Adult  Goal: Free from fall injury  Outcome: Progressing     Problem: Discharge Planning  Goal: Discharge to home or other facility with appropriate resources  Outcome: Progressing     Problem: Chronic Conditions and Co-morbidities  Goal: Patient's chronic conditions and co-morbidity symptoms are monitored and maintained or improved  Outcome: Progressing     Problem: Heart Failure  Goal: Improved gas exchange this shift  Outcome: Progressing

## 2025-06-12 NOTE — PROGRESS NOTES
Maci Catalan is a 89 y.o. female on day 1 of admission presenting with Elevated troponin.      Subjective   Maci Catalan is a 89 y.o. female with a past medical history of NSTEMI, AAA  status post EVAR 11/2019, 3v coronary artery disease status post PCI with drug-eluting stent placement in November 2019 with an LVEF of 35%,, HFrEF, complete heart block status post Medtronic dual-chamber system, history of pancreatic tumor status post Whipple procedure who presented with flulike symptoms with cough and shortness of breath.  In the ED, she was found to be hypoxic to 90% on room air.  Was placed on nasal cannula.  She was noted to have severe hypertension with a blood pressure of 245/96 mmHg.  Chest x-ray shows COPD/emphysematous changes with no acute pathology.  She was noted to have a creatinine of 2.62 mg/dL worsened from 1.37 mg/dL in July 2024 thus nephrology is consulted.        Objective        Vitals 24HR  Heart Rate:  [76-92]   Temp:  [35.7 °C (96.3 °F)-36.6 °C (97.9 °F)]   Resp:  [16-18]   BP: (105-128)/(52-63)   SpO2:  [90 %-94 %]     Intake/Output last 3 Shifts:  No intake or output data in the 24 hours ending 06/12/25 1200    Physical Exam  General:  Patient is awake, alert, and oriented.  Patient is in no acute distress.  HEENT:  Pupils equal and reactive.  Normocephalic.  Moist mucosa.    Neck:  No thyromegaly.  Normal Jugular Venous Pressure.  Cardiovascular:  Regular rate and rhythm.  Normal S1 and S2.  1/6 GLORIA.  Pulmonary:  Clear to auscultation bilaterally.  Abdomen:  Soft. Non-tender.   Non-distended.  Positive bowel sounds.  Lower Extremities:  2+ pedal pulses. No LE edema.  Neurologic:  Cranial nerves intact.  No focal deficit.   Skin: Skin warm and dry, normal skin turgor.   Psychiatric: Normal affect.    Scheduled Medications  Scheduled Medications[1]  Continuous medications  Continuous Medications[2]    PRN Medications[3]     Relevant Results  Results from last 7 days   Lab Units  06/12/25  0450 06/11/25  1512 06/11/25  1010 06/10/25  1009   WBC AUTO x10*3/uL 11.5*  --  7.9 6.5   HEMOGLOBIN g/dL 7.9* 8.4* 9.1* 10.0*   HEMATOCRIT % 25.3* 29.2* 30.2* 33.9*   PLATELETS AUTO x10*3/uL 196  --  193 208   NEUTROS PCT AUTO %  --   --   --  80.1   LYMPHS PCT AUTO %  --   --   --  10.6   MONOS PCT AUTO %  --   --   --  6.5   EOS PCT AUTO %  --   --   --  2.0     Results from last 7 days   Lab Units 06/12/25  0450 06/11/25  0437 06/10/25  1009   SODIUM mmol/L 132* 133* 139   POTASSIUM mmol/L 4.2 4.5 4.0   CHLORIDE mmol/L 102 102 106   CO2 mmol/L 18* 20* 20*   BUN mg/dL 44* 36* 34*   CREATININE mg/dL 2.55* 2.51* 2.62*   GLUCOSE mg/dL 425* 320* 192*   CALCIUM mg/dL 8.1* 7.4* 7.5*       Transthoracic Echo Complete   Final Result      XR chest 1 view   Final Result   Stable cardiac pacemaker on the left.        Findings of underlying COPD and emphysema.        No significant or acute interval change.        MACRO:   None        Signed by: Dillon Catalan 6/10/2025 11:33 AM   Dictation workstation:   [id number]       renal complete    (Results Pending)            Assessment/Plan      Maci Catalan is a 89 y.o. female with a past medical history of NSTEMI, AAA  status post EVAR 11/2019, 3v coronary artery disease status post PCI with drug-eluting stent placement in November 2019 with an LVEF of 35%,, HFrEF, complete heart block status post Medtronic dual-chamber system, history of pancreatic tumor status post Whipple procedure who presented with flulike symptoms with cough and shortness of breath.  In the ED, she was found to be hypoxic to 90% on room air.  Was placed on nasal cannula.  She was noted to have severe hypertension with a blood pressure of 245/96 mmHg.  Chest x-ray shows COPD/emphysematous changes with no acute pathology.  She was noted to have a creatinine of 2.62 mg/dL worsened from 1.37 mg/dL in July 2024 thus nephrology is consulted.     Ms. Catalan has a history of stage G3b chronic  kidney disease with a baseline creatinine of 1.3 presumably up to 1.8 mg/deciliter as of last July.  She had an acute kidney injury back then with her last creatinine settling at 1.37.  Since that time, she was noted to have a drop in EF back down to 35% in February of this year.  Following this, she was placed on losartan 50 mg from low-dose ACE inhibition with lisinopril 2.5 mg daily.  She was also added on Jardiance.  There was no repeat creatinine up until this admission.  She possibly may have an acute kidney injury versus progressive kidney disease in the setting of a reduced EF +/- her medication adjustment with higher dose RAAS blockade and an SGLT2 inhibitor.  I favor the later.  There is no clear history to support an acute kidney injury.  She denies nausea, emesis or diarrhea.  No anti-inflammatory use.  No further medication and/or nephrotoxin exposure. I will obtain a renal ultrasound, a urinalysis, urine indices and proteinuria quantification, currently pending.  I have also asked for a vitamin D, intact PTH, iron stores ferritin which is also pending.  I will add on an SPEP, K/L free light chains to rule out a paraprotein.  She has stable renal function.  Her hyperglycemia is likely related to steroid.  I will add her on sodium bicarbonate 650 mg twice daily to address her acidosis.  Trend her RFP.  Will follow with her care.    Assessment & Plan  Elevated troponin    Acute bronchitis, unspecified organism      I spent 50 minutes in the professional and overall care of this patient.      Sixto Resendiz,            [1] ipratropium-albuteroL, 3 mL, nebulization, TID  [Held by provider] losartan, 50 mg, oral, Daily  melatonin, 9 mg, oral, Nightly  methylPREDNISolone sodium succinate (PF), 40 mg, intravenous, q8h LISA  metoprolol succinate XL, 25 mg, oral, Daily  pantoprazole, 40 mg, oral, Daily before breakfast  polyethylene glycol, 17 g, oral, Daily  sucralfate, 1 g, oral, Before meals & nightly    [2]  heparin, 0-4,000 Units/hr, Last Rate: 500 Units/hr (06/12/25 1127)    [3] PRN medications: acetaminophen **OR** acetaminophen **OR** acetaminophen, heparin, ipratropium-albuteroL, nitroglycerin, oxygen

## 2025-06-12 NOTE — PROGRESS NOTES
Maci Catalan is a 89 y.o. female on day 1 of admission presenting with Elevated troponin.      Subjective   Resting in bed  Having persistent cough  Worse after sips        Objective     Last Recorded Vitals  /58 (BP Location: Left arm, Patient Position: Lying)   Pulse 81   Temp 36 °C (96.8 °F) (Temporal)   Resp 16   Wt (!) 43.1 kg (95 lb)   SpO2 90%   Intake/Output last 3 Shifts:  No intake or output data in the 24 hours ending 06/12/25 0940    Admission Weight  Weight: (!) 43.1 kg (95 lb) (06/10/25 0950)    Daily Weight  06/10/25 : (!) 43.1 kg (95 lb)    Image Results  Electrocardiogram, 12-lead PRN ACS symptoms  Atrial-sensed ventricular-paced rhythm  Abnormal ECG  When compared with ECG of 10-KEVIN-2025 09:47, (unconfirmed)  Vent. rate has decreased BY  15 BPM      Physical Exam  HENT:      Mouth/Throat:      Mouth: Mucous membranes are moist.   Eyes:      Pupils: Pupils are equal, round, and reactive to light.   Cardiovascular:      Rate and Rhythm: Normal rate.   Pulmonary:      Breath sounds: Wheezing present.   Abdominal:      General: Bowel sounds are normal.      Palpations: Abdomen is soft.   Skin:     General: Skin is warm.      Capillary Refill: Capillary refill takes 2 to 3 seconds.   Neurological:      Mental Status: She is alert and oriented to person, place, and time.         Relevant Results             Medications Ordered Prior to Encounter[1]    Results for orders placed or performed during the hospital encounter of 06/10/25 (from the past 24 hours)   Troponin I, High Sensitivity   Result Value Ref Range    Troponin I, High Sensitivity 196 (HH) 0 - 13 ng/L   CBC   Result Value Ref Range    WBC 7.9 4.4 - 11.3 x10*3/uL    nRBC 0.0 0.0 - 0.0 /100 WBCs    RBC 3.97 (L) 4.00 - 5.20 x10*6/uL    Hemoglobin 9.1 (L) 12.0 - 16.0 g/dL    Hematocrit 30.2 (L) 36.0 - 46.0 %    MCV 76 (L) 80 - 100 fL    MCH 22.9 (L) 26.0 - 34.0 pg    MCHC 30.1 (L) 32.0 - 36.0 g/dL    RDW 15.4 (H) 11.5 - 14.5 %     Platelets 193 150 - 450 x10*3/uL   Transthoracic Echo Complete   Result Value Ref Range    AV pk sima 1.06 m/s    MV E/A ratio 0.48     LA vol index A/L 23.7 ml/m2    Tricuspid annular plane systolic excursion 1.7 cm    LV EF 48 %    LVIDd 3.77 cm    RVSP 33 mmHg    AV pk grad 5 mmHg    LV A4C EF 30.1    Electrocardiogram, 12-lead PRN ACS symptoms   Result Value Ref Range    Ventricular Rate 95 BPM    Atrial Rate 95 BPM    WV Interval 158 ms    QRS Duration 154 ms    QT Interval 426 ms    QTC Calculation(Bazett) 535 ms    P Axis 74 degrees    R Axis 4 degrees    T Axis 106 degrees    QRS Count 15 beats    Q Onset 191 ms    P Onset 112 ms    P Offset 167 ms    T Offset 404 ms    QTC Fredericia 496 ms   Troponin I, High Sensitivity   Result Value Ref Range    Troponin I, High Sensitivity 166 (HH) 0 - 13 ng/L   Hemoglobin and hematocrit, blood   Result Value Ref Range    Hemoglobin 8.4 (L) 12.0 - 16.0 g/dL    Hematocrit 29.2 (L) 36.0 - 46.0 %   Basic Metabolic Panel   Result Value Ref Range    Glucose 425 (H) 74 - 99 mg/dL    Sodium 132 (L) 136 - 145 mmol/L    Potassium 4.2 3.5 - 5.3 mmol/L    Chloride 102 98 - 107 mmol/L    Bicarbonate 18 (L) 21 - 32 mmol/L    Anion Gap 16 10 - 20 mmol/L    Urea Nitrogen 44 (H) 6 - 23 mg/dL    Creatinine 2.55 (H) 0.50 - 1.05 mg/dL    eGFR 18 (L) >60 mL/min/1.73m*2    Calcium 8.1 (L) 8.6 - 10.3 mg/dL   CBC   Result Value Ref Range    WBC 11.5 (H) 4.4 - 11.3 x10*3/uL    nRBC 0.0 0.0 - 0.0 /100 WBCs    RBC 3.44 (L) 4.00 - 5.20 x10*6/uL    Hemoglobin 7.9 (L) 12.0 - 16.0 g/dL    Hematocrit 25.3 (L) 36.0 - 46.0 %    MCV 74 (L) 80 - 100 fL    MCH 23.0 (L) 26.0 - 34.0 pg    MCHC 31.2 (L) 32.0 - 36.0 g/dL    RDW 15.3 (H) 11.5 - 14.5 %    Platelets 196 150 - 450 x10*3/uL   Heparin Assay, UFH   Result Value Ref Range    Heparin Unfractionated 0.5 See Comment Below for Therapeutic Ranges IU/mL       No intake or output data in the 24 hours ending 06/12/25 0941    Vitals:    06/10/25 0950    Weight: (!) 43.1 kg (95 lb)             Assessment & Plan  Elevated troponin   HTN, uncontrolled   -Consult cardiology. Input noted Cont heparin gtt  -Tele       Acute bronchitis, unspecified organism  Persistant cough  Worse w po  Speech eval  Qtc noted >> doxycyline  Check ct chest  Anti tussives       PTOT    -Continue current treatment as ordered. Will make adjustments as necessary.    VTE Prophylaxis  -See Orders    Plan of care discussed with: Provider, RN, Patient      Patient case and plan of care discussed with Dr. JACQUELYN Hendrix.    GLENIS Norris - CNP  -In collaboration with Dr. JACQUELYN Hendrix    [facility]  Office: [phone]  Fax: [phone]  I have reviewed the above note obtained and documented by the NP/PA and I personally participated in the key components. I have discussed the case and management of the patient's care. Changes made to the note, and all key components of history and physical/progress note done by me.   nursing  MD Jacky Shepard MD      .            [1]   No current facility-administered medications on file prior to encounter.     Current Outpatient Medications on File Prior to Encounter   Medication Sig Dispense Refill    acetaminophen (Tylenol) 325 mg tablet Take 2 tablets (650 mg) by mouth every 4 hours if needed for mild pain (1 - 3) or moderate pain (4 - 6).      empagliflozin (Jardiance) 10 mg Take 1 tablet (10 mg) by mouth once daily. 90 tablet 3    famotidine (Pepcid) 20 mg tablet Take 1 tablet (20 mg) by mouth once daily.      furosemide (Lasix) 40 mg tablet Take 1 tablet (40 mg) by mouth once daily. Take with Meals 100 tablet 3    losartan (Cozaar) 50 mg tablet Take 1 tablet (50 mg) by mouth once daily. 90 tablet 3    melatonin 10 mg tablet Take 1 tablet (10 mg) by mouth once daily at bedtime.      metFORMIN (Glucophage) 500 mg tablet Take 1 tablet (500 mg) by mouth every 12 hours. With food      metoprolol  succinate XL (Toprol-XL) 25 mg 24 hr tablet Take 1 tablet (25 mg) by mouth once daily. Do not crush or chew. 90 tablet 3    sucralfate (Carafate) 1 gram tablet Take 1 tablet (1 g) by mouth 4 times a day.      nitroglycerin (Nitrostat) 0.4 mg SL tablet Place 1 tablet (0.4 mg) under the tongue every 5 minutes if needed for chest pain (UP TO 3 DOSES AS NEEDED FOR CHEST PAIN.CALL 911 IF PAIN PERSISTS.).

## 2025-06-13 ENCOUNTER — APPOINTMENT (OUTPATIENT)
Dept: RADIOLOGY | Facility: HOSPITAL | Age: OVER 89
DRG: 280 | End: 2025-06-13
Payer: MEDICARE

## 2025-06-13 LAB
25(OH)D3 SERPL-MCNC: 9 NG/ML (ref 30–100)
ALBUMIN SERPL BCP-MCNC: 3.1 G/DL (ref 3.4–5)
ALP SERPL-CCNC: 111 U/L (ref 33–136)
ALT SERPL W P-5'-P-CCNC: 12 U/L (ref 7–45)
ANION GAP SERPL CALC-SCNC: 14 MMOL/L (ref 10–20)
AST SERPL W P-5'-P-CCNC: 13 U/L (ref 9–39)
BILIRUB DIRECT SERPL-MCNC: 0.1 MG/DL (ref 0–0.3)
BILIRUB SERPL-MCNC: 0.3 MG/DL (ref 0–1.2)
BUN SERPL-MCNC: 45 MG/DL (ref 6–23)
CALCIUM SERPL-MCNC: 8.1 MG/DL (ref 8.6–10.3)
CHLORIDE SERPL-SCNC: 106 MMOL/L (ref 98–107)
CO2 SERPL-SCNC: 21 MMOL/L (ref 21–32)
CREAT SERPL-MCNC: 2.51 MG/DL (ref 0.5–1.05)
CREAT UR-MCNC: 77.1 MG/DL (ref 20–320)
EGFRCR SERPLBLD CKD-EPI 2021: 18 ML/MIN/1.73M*2
ERYTHROCYTE [DISTWIDTH] IN BLOOD BY AUTOMATED COUNT: 15.2 % (ref 11.5–14.5)
FERRITIN SERPL-MCNC: 34 NG/ML (ref 8–150)
GLUCOSE BLD MANUAL STRIP-MCNC: 156 MG/DL (ref 74–99)
GLUCOSE BLD MANUAL STRIP-MCNC: 247 MG/DL (ref 74–99)
GLUCOSE BLD MANUAL STRIP-MCNC: 256 MG/DL (ref 74–99)
GLUCOSE SERPL-MCNC: 141 MG/DL (ref 74–99)
HCT VFR BLD AUTO: 28 % (ref 36–46)
HGB BLD-MCNC: 8.3 G/DL (ref 12–16)
IRON SATN MFR SERPL: 9 % (ref 25–45)
IRON SERPL-MCNC: 26 UG/DL (ref 35–150)
MCH RBC QN AUTO: 22.9 PG (ref 26–34)
MCHC RBC AUTO-ENTMCNC: 29.6 G/DL (ref 32–36)
MCV RBC AUTO: 77 FL (ref 80–100)
NRBC BLD-RTO: 0 /100 WBCS (ref 0–0)
PLATELET # BLD AUTO: 185 X10*3/UL (ref 150–450)
POTASSIUM SERPL-SCNC: 3.7 MMOL/L (ref 3.5–5.3)
PROT SERPL-MCNC: 5.9 G/DL (ref 6.4–8.2)
PROT SERPL-MCNC: 5.9 G/DL (ref 6.4–8.2)
PROT UR-ACNC: 91 MG/DL (ref 5–24)
PROT/CREAT UR: 1.18 MG/MG CREAT (ref 0–0.17)
PTH-INTACT SERPL-MCNC: 231.8 PG/ML (ref 18.5–88)
RBC # BLD AUTO: 3.62 X10*6/UL (ref 4–5.2)
SODIUM SERPL-SCNC: 137 MMOL/L (ref 136–145)
TIBC SERPL-MCNC: 305 UG/DL (ref 240–445)
UIBC SERPL-MCNC: 279 UG/DL (ref 110–370)
WBC # BLD AUTO: 9.8 X10*3/UL (ref 4.4–11.3)

## 2025-06-13 PROCEDURE — 92610 EVALUATE SWALLOWING FUNCTION: CPT | Mod: GN

## 2025-06-13 PROCEDURE — 76705 ECHO EXAM OF ABDOMEN: CPT

## 2025-06-13 PROCEDURE — 83970 ASSAY OF PARATHORMONE: CPT | Mod: AHULAB | Performed by: INTERNAL MEDICINE

## 2025-06-13 PROCEDURE — 82728 ASSAY OF FERRITIN: CPT | Performed by: INTERNAL MEDICINE

## 2025-06-13 PROCEDURE — 2500000002 HC RX 250 W HCPCS SELF ADMINISTERED DRUGS (ALT 637 FOR MEDICARE OP, ALT 636 FOR OP/ED): Performed by: FAMILY MEDICINE

## 2025-06-13 PROCEDURE — 2500000004 HC RX 250 GENERAL PHARMACY W/ HCPCS (ALT 636 FOR OP/ED): Performed by: INTERNAL MEDICINE

## 2025-06-13 PROCEDURE — 2500000005 HC RX 250 GENERAL PHARMACY W/O HCPCS: Performed by: NURSE PRACTITIONER

## 2025-06-13 PROCEDURE — 99221 1ST HOSP IP/OBS SF/LOW 40: CPT

## 2025-06-13 PROCEDURE — 2500000002 HC RX 250 W HCPCS SELF ADMINISTERED DRUGS (ALT 637 FOR MEDICARE OP, ALT 636 FOR OP/ED): Performed by: NURSE PRACTITIONER

## 2025-06-13 PROCEDURE — 99232 SBSQ HOSP IP/OBS MODERATE 35: CPT | Performed by: INTERNAL MEDICINE

## 2025-06-13 PROCEDURE — 76705 ECHO EXAM OF ABDOMEN: CPT | Performed by: STUDENT IN AN ORGANIZED HEALTH CARE EDUCATION/TRAINING PROGRAM

## 2025-06-13 PROCEDURE — 80048 BASIC METABOLIC PNL TOTAL CA: CPT | Performed by: FAMILY MEDICINE

## 2025-06-13 PROCEDURE — 97530 THERAPEUTIC ACTIVITIES: CPT | Mod: GO

## 2025-06-13 PROCEDURE — 2500000004 HC RX 250 GENERAL PHARMACY W/ HCPCS (ALT 636 FOR OP/ED): Performed by: NURSE PRACTITIONER

## 2025-06-13 PROCEDURE — 82947 ASSAY GLUCOSE BLOOD QUANT: CPT

## 2025-06-13 PROCEDURE — 2500000005 HC RX 250 GENERAL PHARMACY W/O HCPCS: Performed by: FAMILY MEDICINE

## 2025-06-13 PROCEDURE — 2500000001 HC RX 250 WO HCPCS SELF ADMINISTERED DRUGS (ALT 637 FOR MEDICARE OP): Performed by: NURSE PRACTITIONER

## 2025-06-13 PROCEDURE — 1200000002 HC GENERAL ROOM WITH TELEMETRY DAILY

## 2025-06-13 PROCEDURE — 94640 AIRWAY INHALATION TREATMENT: CPT

## 2025-06-13 PROCEDURE — 36415 COLL VENOUS BLD VENIPUNCTURE: CPT | Performed by: INTERNAL MEDICINE

## 2025-06-13 PROCEDURE — 84155 ASSAY OF PROTEIN SERUM: CPT | Mod: AHULAB | Performed by: INTERNAL MEDICINE

## 2025-06-13 PROCEDURE — 97165 OT EVAL LOW COMPLEX 30 MIN: CPT | Mod: GO

## 2025-06-13 PROCEDURE — 97161 PT EVAL LOW COMPLEX 20 MIN: CPT | Mod: GP

## 2025-06-13 PROCEDURE — 82248 BILIRUBIN DIRECT: CPT

## 2025-06-13 PROCEDURE — 86334 IMMUNOFIX E-PHORESIS SERUM: CPT | Mod: AHULAB | Performed by: INTERNAL MEDICINE

## 2025-06-13 PROCEDURE — 85027 COMPLETE CBC AUTOMATED: CPT | Performed by: FAMILY MEDICINE

## 2025-06-13 PROCEDURE — 83521 IG LIGHT CHAINS FREE EACH: CPT | Mod: AHULAB | Performed by: INTERNAL MEDICINE

## 2025-06-13 RX ORDER — FERROUS SULFATE 325(65) MG
65 TABLET ORAL
Status: DISCONTINUED | OUTPATIENT
Start: 2025-06-14 | End: 2025-06-17 | Stop reason: HOSPADM

## 2025-06-13 RX ADMIN — GUAIFENESIN 200 MG: 200 SOLUTION ORAL at 20:52

## 2025-06-13 RX ADMIN — IPRATROPIUM BROMIDE AND ALBUTEROL SULFATE 3 ML: 2.5; .5 SOLUTION RESPIRATORY (INHALATION) at 12:39

## 2025-06-13 RX ADMIN — SUCRALFATE 1 G: 1 TABLET ORAL at 23:21

## 2025-06-13 RX ADMIN — DOXYCYCLINE HYCLATE 100 MG: 100 TABLET, COATED ORAL at 08:27

## 2025-06-13 RX ADMIN — IPRATROPIUM BROMIDE AND ALBUTEROL SULFATE 3 ML: 2.5; .5 SOLUTION RESPIRATORY (INHALATION) at 19:10

## 2025-06-13 RX ADMIN — PIPERACILLIN SODIUM AND TAZOBACTAM SODIUM 2.25 G: 2; .25 INJECTION, SOLUTION INTRAVENOUS at 21:19

## 2025-06-13 RX ADMIN — Medication 1 L/MIN: at 12:39

## 2025-06-13 RX ADMIN — Medication 9 MG: at 20:52

## 2025-06-13 RX ADMIN — INSULIN LISPRO 2 UNITS: 100 INJECTION, SOLUTION INTRAVENOUS; SUBCUTANEOUS at 15:57

## 2025-06-13 RX ADMIN — IPRATROPIUM BROMIDE AND ALBUTEROL SULFATE 3 ML: 2.5; .5 SOLUTION RESPIRATORY (INHALATION) at 07:27

## 2025-06-13 RX ADMIN — Medication 1 L/MIN: at 19:10

## 2025-06-13 RX ADMIN — PIPERACILLIN SODIUM AND TAZOBACTAM SODIUM 2.25 G: 2; .25 INJECTION, SOLUTION INTRAVENOUS at 15:07

## 2025-06-13 RX ADMIN — Medication 1 L/MIN: at 07:27

## 2025-06-13 RX ADMIN — SUCRALFATE 1 G: 1 TABLET ORAL at 12:16

## 2025-06-13 RX ADMIN — SUCRALFATE 1 G: 1 TABLET ORAL at 15:57

## 2025-06-13 RX ADMIN — METOPROLOL SUCCINATE 25 MG: 25 TABLET, EXTENDED RELEASE ORAL at 08:26

## 2025-06-13 RX ADMIN — PANTOPRAZOLE SODIUM 40 MG: 40 TABLET, DELAYED RELEASE ORAL at 06:08

## 2025-06-13 RX ADMIN — INSULIN LISPRO 3 UNITS: 100 INJECTION, SOLUTION INTRAVENOUS; SUBCUTANEOUS at 12:16

## 2025-06-13 RX ADMIN — SODIUM BICARBONATE 650 MG: 650 TABLET ORAL at 20:52

## 2025-06-13 RX ADMIN — SALINE NASAL SPRAY 1 SPRAY: 1.5 SOLUTION NASAL at 15:16

## 2025-06-13 RX ADMIN — SODIUM BICARBONATE 650 MG: 650 TABLET ORAL at 08:26

## 2025-06-13 RX ADMIN — SUCRALFATE 1 G: 1 TABLET ORAL at 06:08

## 2025-06-13 ASSESSMENT — COGNITIVE AND FUNCTIONAL STATUS - GENERAL
HELP NEEDED FOR BATHING: A LITTLE
DAILY ACTIVITIY SCORE: 24
TOILETING: A LITTLE
TURNING FROM BACK TO SIDE WHILE IN FLAT BAD: A LITTLE
CLIMB 3 TO 5 STEPS WITH RAILING: A LITTLE
DRESSING REGULAR UPPER BODY CLOTHING: A LITTLE
STANDING UP FROM CHAIR USING ARMS: A LITTLE
MOBILITY SCORE: 18
DRESSING REGULAR LOWER BODY CLOTHING: A LITTLE
MOVING FROM LYING ON BACK TO SITTING ON SIDE OF FLAT BED WITH BEDRAILS: A LITTLE
WALKING IN HOSPITAL ROOM: A LITTLE
MOBILITY SCORE: 24
DAILY ACTIVITIY SCORE: 24
MOBILITY SCORE: 24
MOVING TO AND FROM BED TO CHAIR: A LITTLE
DAILY ACTIVITIY SCORE: 19
PERSONAL GROOMING: A LITTLE

## 2025-06-13 ASSESSMENT — PAIN SCALES - GENERAL
PAINLEVEL_OUTOF10: 0 - NO PAIN

## 2025-06-13 ASSESSMENT — ACTIVITIES OF DAILY LIVING (ADL)
ADL_ASSISTANCE: INDEPENDENT
ADL_ASSISTANCE: INDEPENDENT

## 2025-06-13 ASSESSMENT — PAIN - FUNCTIONAL ASSESSMENT
PAIN_FUNCTIONAL_ASSESSMENT: 0-10
PAIN_FUNCTIONAL_ASSESSMENT: 0-10

## 2025-06-13 ASSESSMENT — ENCOUNTER SYMPTOMS: COUGH: 1

## 2025-06-13 NOTE — PROGRESS NOTES
Physical Therapy    Physical Therapy Evaluation    Patient Name: Maci Catalan  MRN: [id number]  Department: [place] [address]  Room: [address] [place] [address]  Today's Date: 6/13/2025   Time Calculation  Start Time: 1452  Stop Time: 1502  Time Calculation (min): 10 min    Assessment/Plan   PT Assessment  PT Assessment Results: Decreased strength, Decreased endurance, Impaired balance, Decreased mobility  Rehab Prognosis: Good  Barriers to Discharge Home: No anticipated barriers  End of Session Communication: Bedside nurse  Assessment Comment: PT Evaluation Completed. The patient presented with decreased mobility, balance, endurance, strength and increased pain and fatigue. These impairments are negatively impacting her ability to perform at baseline functional level, in home environment. This patient would benefit from skilled therapy intervention to address limitations and progress towards the PT goals.  Anticipate       frequency PT needs at discharge  End of Session Patient Position: Bed, 3 rail up, Alarm on  IP OR SWING BED PT PLAN  Inpatient or Swing Bed: Inpatient  PT Plan  Treatment/Interventions: Bed mobility, Transfer training, Gait training, Stair training, Balance training, Strengthening, Endurance training, Range of motion, Therapeutic exercise, Therapeutic activity, Home exercise program  PT Plan: Ongoing PT  PT Frequency: 3 times per week  PT Discharge Recommendations: Low intensity level of continued care  PT Recommended Transfer Status: Assist x1  PT - OK to Discharge: Yes (PT POC established.)    Subjective     PT Visit Info:  PT Received On: 06/13/25  General Visit Information:  General  Reason for Referral: 89 y.o. female presenting with Elevated troponin, SOB, and flu-like symptoms. In the ED, she was found to be hypoxic to 90% on room air.  Was placed on nasal cannula.  She was noted to have severe hypertension with a blood pressure of 245/96 mmHg.  Referred By: KAYDEN Gonzales  Past Medical History Relevant to  Rehab: Medical History[1]    Prior to Session Communication: Bedside nurse  Patient Position Received: Bed, 3 rail up, Alarm on  General Comment: Pt pleasant and agreeable to PT eval.  Home Living:  Home Living  Type of Home: Apartment  Lives With: Alone  Home Adaptive Equipment: Walker rolling or standard  Home Layout: One level  Home Access: Level entry  Bathroom Shower/Tub: Tub/shower unit  Bathroom Toilet: Handicapped height  Bathroom Equipment: Shower chair with back  Prior Level of Function:  Prior Function Per Pt/Caregiver Report  Level of [place]: Independent with ADLs and functional transfers, Needs assistance with homemaking  Receives Help From: Family (sister, daughter)  ADL Assistance: Independent  Homemaking Assistance: Needs assistance  Ambulatory Assistance: Independent  Prior Function Comments: Denies any falls in the past 6 months. Does not drive.  Precautions:  Precautions  Medical Precautions: Fall precautions      Date/Time Vitals Session Patient Position Pulse Resp SpO2 BP MAP (mmHg)    06/13/25 1547 --  --  104  17  90 %  120/57  75           Vital Signs Comment: HR moniotred throughout session, ranging from 100-130 with activity. RN aware     Objective   Pain:  Pain Assessment  Pain Assessment: 0-10  0-10 (Numeric) Pain Score: 0 - No pain  Cognition:  Cognition  Overall Cognitive Status: Within Functional Limits  Orientation Level: Oriented X4    General Assessments:  Activity Tolerance  Endurance: Tolerates 10 - 20 min exercise with multiple rests    Sensation  Light Touch: No apparent deficits         Postural Control  Postural Control: Within Functional Limits    Static Sitting Balance  Static Sitting-Balance Support: Feet supported, Bilateral upper extremity supported  Static Sitting-Level of Assistance: Close supervision  Dynamic Sitting Balance  Dynamic Sitting-Balance Support: Feet supported, Bilateral upper extremity supported  Dynamic Sitting-Level of Assistance: Close  supervision    Static Standing Balance  Static Standing-Balance Support: Bilateral upper extremity supported  Static Standing-Level of Assistance: Contact guard  Dynamic Standing Balance  Dynamic Standing-Balance Support: Bilateral upper extremity supported  Dynamic Standing-Level of Assistance: Contact guard  Functional Assessments:  Bed Mobility  Bed Mobility: Yes  Bed Mobility 1  Bed Mobility 1: Supine to sitting, Sitting to supine  Level of Assistance 1: Close supervision  Bed Mobility Comments 1: completes without physical assist needed.    Transfers  Transfer: Yes  Transfer 1  Technique 1: Sit to stand, Stand to sit  Transfer Device 1: Walker  Transfer Level of Assistance 1: Contact guard  Trials/Comments 1: Cues for hand placement and backing up fully prior to sitting.    Ambulation/Gait Training  Ambulation/Gait Training Performed: Yes  Ambulation/Gait Training 1  Surface 1: Level tile  Device 1: Rolling walker  Assistance 1: Contact guard  Comments/Distance (ft) 1: 30 ft. Pt ambulates with decreased nohemi and step length.  Cues for sequencing and walker placement. Overall demonstrating good stability.       Extremity/Trunk Assessments:  RUE   RUE : Within Functional Limits  LUE   LUE: Within Functional Limits  RLE   RLE :  (Strength >3/5 based on observation of functional mobility. ROM WFL.)  LLE   LLE :  (Strength >3/5 based on observation of functional mobility. ROM WFL.)  Outcome Measures:  [facility] Basic Mobility  Turning from your back to your side while in a flat bed without using bedrails: A little  Moving from lying on your back to sitting on the side of a flat bed without using bedrails: A little  Moving to and from bed to chair (including a wheelchair): A little  Standing up from a chair using your arms (e.g. wheelchair or bedside chair): A little  To walk in hospital room: A little  Climbing 3-5 steps with railing: A little  Basic Mobility - Total Score: 18    Encounter Problems       Encounter  Problems (Active)       Balance       complete all mobility with normal balance while dual tasking, negotiating in a dynamic environment, carrying items, etc., with proactive and reactive static and dynamic standing and sitting tasks, with mod I and LRAD, >15 minutes.         Start:  06/13/25    Expected End:  06/27/25               Mobility       STG - Patient will ambulate 150 ft mod I with LRAD and stable vitals.        Start:  06/13/25    Expected End:  06/27/25               PT Transfers       STG - Patient will perform bed mobility independently.        Start:  06/13/25    Expected End:  06/27/25            STG - Patient will transfer sit to and from stand mod I with LRAD.        Start:  06/13/25    Expected End:  06/27/25               Pain - Adult              Education Documentation  Body Mechanics, taught by Jenifer Sellers, PT at 6/13/2025  4:19 PM.  Learner: Patient  Readiness: Acceptance  Method: Explanation  Response: Verbalizes Understanding    Mobility Training, taught by Jenifer Sellers, PT at 6/13/2025  4:19 PM.  Learner: Patient  Readiness: Acceptance  Method: Explanation  Response: Verbalizes Understanding    Education Comments  No comments found.                 [1]   Past Medical History:  Diagnosis Date    Personal history of other diseases of the circulatory system     History of hypertension    Personal history of other endocrine, nutritional and metabolic disease     History of high cholesterol

## 2025-06-13 NOTE — ASSESSMENT & PLAN NOTE
Persistant cough  Worse w po  Speech eval, stable      Check ct chest >> noted  Start IV abx  Consult GI  Anti tussives      Luis Enrique/ckd3  Renal on board  IVFs  Trend labs

## 2025-06-13 NOTE — CARE PLAN
The patient's goals for the shift include  breathe better so I can go home early    The clinical goals for the shift include Pt will be free from SOB,keep safe      Problem: Chronic Conditions and Co-morbidities  Goal: Patient's chronic conditions and co-morbidity symptoms are monitored and maintained or improved  Outcome: Not Progressing

## 2025-06-13 NOTE — PROGRESS NOTES
Maci Catalan is a 89 y.o. female on day 2 of admission presenting with Elevated troponin.      Subjective   Maci Catalan is a 89 y.o. female with a past medical history of NSTEMI, AAA  status post EVAR 11/2019, 3v coronary artery disease status post PCI with drug-eluting stent placement in November 2019 with an LVEF of 35%,, HFrEF, complete heart block status post Medtronic dual-chamber system, history of pancreatic tumor status post Whipple procedure who presented with flulike symptoms with cough and shortness of breath.  In the ED, she was found to be hypoxic to 90% on room air.  Was placed on nasal cannula.  She was noted to have severe hypertension with a blood pressure of 245/96 mmHg.  Chest x-ray shows COPD/emphysematous changes with no acute pathology.  She was noted to have a creatinine of 2.62 mg/dL worsened from 1.37 mg/dL in July 2024 thus nephrology is consulted.     Seen and examined.   ADELAIDA. She does not offer specific complaints.        Objective        Vitals 24HR  Heart Rate:  [82-92]   Temp:  [35.9 °C (96.6 °F)-36.7 °C (98.1 °F)]   Resp:  [18-19]   BP: (105-140)/(45-86)   SpO2:  [90 %-93 %]     Intake/Output last 3 Shifts:    Intake/Output Summary (Last 24 hours) at 6/13/2025 1407  Last data filed at 6/13/2025 0600  Gross per 24 hour   Intake 50 ml   Output --   Net 50 ml       Physical Exam  General:  Patient is awake, alert, and oriented.  Patient is in no acute distress.  HEENT:  Pupils equal and reactive.  Normocephalic.  Moist mucosa.    Neck:  No thyromegaly.  Normal Jugular Venous Pressure.  Cardiovascular:  Regular rate and rhythm.  Normal S1 and S2.  1/6 GLORIA.  Pulmonary:  Clear to auscultation bilaterally.  Abdomen:  Soft. Non-tender.   Non-distended.  Positive bowel sounds.  Lower Extremities:  2+ pedal pulses. No LE edema.  Neurologic:  Cranial nerves intact.  No focal deficit.   Skin: Skin warm and dry, normal skin turgor.   Psychiatric: Normal affect.    Scheduled  Medications  Scheduled Medications[1]  Continuous medications  Continuous Medications[2]    PRN Medications[3]     Relevant Results  Results from last 7 days   Lab Units 06/13/25  0506 06/12/25  0450 06/11/25  1512 06/11/25  1010 06/10/25  1009   WBC AUTO x10*3/uL 9.8 11.5*  --  7.9 6.5   HEMOGLOBIN g/dL 8.3* 7.9* 8.4* 9.1* 10.0*   HEMATOCRIT % 28.0* 25.3* 29.2* 30.2* 33.9*   PLATELETS AUTO x10*3/uL 185 196  --  193 208   NEUTROS PCT AUTO %  --   --   --   --  80.1   LYMPHS PCT AUTO %  --   --   --   --  10.6   MONOS PCT AUTO %  --   --   --   --  6.5   EOS PCT AUTO %  --   --   --   --  2.0     Results from last 7 days   Lab Units 06/13/25  0506 06/12/25  0450 06/11/25  0437   SODIUM mmol/L 137 132* 133*   POTASSIUM mmol/L 3.7 4.2 4.5   CHLORIDE mmol/L 106 102 102   CO2 mmol/L 21 18* 20*   BUN mg/dL 45* 44* 36*   CREATININE mg/dL 2.51* 2.55* 2.51*   GLUCOSE mg/dL 141* 425* 320*   CALCIUM mg/dL 8.1* 8.1* 7.4*       CT chest wo IV contrast   Final Result   Moderate emphysema with central bronchial wall thickening. Small   patchy/nodular infiltrates in both lung bases. Follow-up to ensure   resolution after appropriate treatment recommended.        Distal esophageal wall thickening. Correlate with possible   esophagitis.        Enlarged lymph node in the precarinal space. Attention at follow-up   recommended.        Small pericardial effusion.        Incidental findings in the upper abdomen including air in the biliary   and pancreatic ductal systems and probable sizable pancreatic duct   calculi.        Other findings as described above.        MACRO:   None.        Signed by: Irene Javed 6/12/2025 3:09 PM   Dictation workstation:   [id number]      US renal complete   Final Result   Normal size kidneys without hydronephrosis.        Right renal cysts.        Echoes/debris in the urinary bladder. Correlation with urinalysis may   be helpful.        MACRO:   None.        Signed by: Irene Javed 6/12/2025 1:31 PM    Dictation workstation:   [id number]      Transthoracic Echo Complete   Final Result      XR chest 1 view   Final Result   Stable cardiac pacemaker on the left.        Findings of underlying COPD and emphysema.        No significant or acute interval change.        MACRO:   None        Signed by: Dillon Catalan 6/10/2025 11:33 AM   Dictation workstation:   [id number]               Assessment/Plan      Maci Catalan is a 89 y.o. female with a past medical history of NSTEMI, AAA  status post EVAR 11/2019, 3v coronary artery disease status post PCI with drug-eluting stent placement in November 2019 with an LVEF of 35%,, HFrEF, complete heart block status post Medtronic dual-chamber system, history of pancreatic tumor status post Whipple procedure who presented with flulike symptoms with cough and shortness of breath.  In the ED, she was found to be hypoxic to 90% on room air.  Was placed on nasal cannula.  She was noted to have severe hypertension with a blood pressure of 245/96 mmHg.  Chest x-ray shows COPD/emphysematous changes with no acute pathology.  She was noted to have a creatinine of 2.62 mg/dL worsened from 1.37 mg/dL in July 2024 thus nephrology is consulted.     Ms. Catalan has a history of stage G3b chronic kidney disease with a baseline creatinine of 1.3 up to as high as 1.8 mg/deciliter as of last July.  She had an acute kidney injury back then with her last creatinine settling at 1.37.  Since that time, she was noted to have a drop in EF back down to 35% in February of this year.  Following this, she was placed on losartan 50 mg from low-dose ACE inhibition with lisinopril 2.5 mg daily.  She was also added on Jardiance.  There was no repeat creatinine up until this admission.  She possibly may have an acute kidney injury versus progressive kidney disease in the setting of a reduced EF +/- her medication adjustment with higher dose RAAS blockade and an SGLT2 inhibitor.  I favor the later.  There is  no clear history to support an acute kidney injury per se.  She denies nausea, emesis or diarrhea.  No anti-inflammatory use.  No further medication and/or nephrotoxin exposure. Renal US was unremarkable. She has protein, blood, glucose, lueks and wbc's in the urine. Proteinuria quantification is pending. Her intact PTH is elevated and suggestive of more advanced CKD. Vitamin D is pending. I have reviewed iron stores/ferritin and will start her on a daily iron supplement.  I will add on an SPEP, K/L free light chains to rule out a paraprotein.  She has stable renal function.  I will continue sodium bicarbonate 650 mg twice daily to address her acidosis.  Trend her RFP.  Will follow with her care.    Assessment & Plan  Elevated troponin    Acute bronchitis, unspecified organism    Benign essential HTN    3-vessel coronary artery disease    Chronic systolic congestive heart failure    Diabetes mellitus type 2, noninsulin dependent (Multi)    Hyperglycemia      I spent 45 minutes in the professional and overall care of this patient.      Sixto Resendiz,            [1] insulin lispro, 0-5 Units, subcutaneous, TID AC  ipratropium-albuteroL, 3 mL, nebulization, TID  [Held by provider] losartan, 50 mg, oral, Daily  melatonin, 9 mg, oral, Nightly  metoprolol succinate XL, 25 mg, oral, Daily  pantoprazole, 40 mg, oral, Daily before breakfast  piperacillin-tazobactam, 2.25 g, intravenous, q8h  polyethylene glycol, 17 g, oral, Daily  sodium bicarbonate, 650 mg, oral, BID  sucralfate, 1 g, oral, Before meals & nightly     [2]    [3] PRN medications: acetaminophen **OR** acetaminophen **OR** acetaminophen, guaiFENesin, ipratropium-albuteroL, nitroglycerin, oxygen, sodium chloride

## 2025-06-13 NOTE — CONSULTS
Inpatient consult to Gastroenterology  Consult performed by: KAYDEN López  Consult ordered by: KAYDEN Gonzales  Reason for consult: Abnormal CT scan of the GI tract          Reason For Consult  Abnormal CT scan of the GI tract    History Of Present Illness  Maci Catalan is a 89 y.o. female with a PMHx s/f CAD s/p PCI in 2019, CHF, AAA s/p EVAR in 11/2019, Complete Heart Block s/p PPM, HTN, HLD, CHF, PAD, CKD, DMT2, s/p Whipple procedure for IPMN in 2018, GI bleed, large hiatal hernia, esophagitis, esophageal/gastric/jejunal ulcers who presented to [facility] ER on 6/10/25 for cough and viral symptoms.  Admitted for hypertension, MOOK on CKD, and NSTEMI.  On exam, patient denies heartburn, dysphagia, odynophagia, weight loss, nausea/vomiting, abdominal pain, melena, and hematochezia.  Labs are notable for BUN/creatinine 45/2.51, GFR 18, albumin 3.1, alk phos 111, ALT 12, bili total 0.3, bili direct 0.1, WBC 9.8, H&H 8.3/28, MCV 77.    6/12/2025 CT chest wo IV contrast  IMPRESSION:  -Moderate emphysema with central bronchial wall thickening. Small patchy/nodular infiltrates in both lung bases. Follow-up to ensure resolution after appropriate treatment recommended.  -Distal esophageal wall thickening. Correlate with possible esophagitis.  -Enlarged lymph node in the precarinal space. Attention at follow-up recommended.  -Small pericardial effusion.  -Incidental findings in the upper abdomen including air in the biliary and pancreatic ductal systems and probable sizable pancreatic duct calculi.    2/7/2023 EGD for UGIB  - LA Grade C reflux esophagitis with no bleeding.  - Esophageal ulcer with no stigmata of recent bleeding  - Medium-sized hiatal hernia with a few Andrés ulcers.  - Non-bleeding gastric ulcers with no stigmata of bleeding.  - Non-bleeding jejunal ulcer with no stigmata of bleeding.  - The examination was otherwise normal.  - No specimens collected.    GI service was consulted for  abnormal CT scan of the GI tract.     Past Medical History  She has a past medical history of Personal history of other diseases of the circulatory system and Personal history of other endocrine, nutritional and metabolic disease.    Surgical History  She has a past surgical history that includes Hysterectomy (04/17/2018); Other surgical history (06/19/2018); Cholecystectomy (06/19/2018); Other surgical history (06/21/2019); Other surgical history (06/21/2019); CT angio abdomen pelvis w and or wo IV IV contrast (8/16/2019); CT angio aorta and bilateral iliofemoral runoff including without contrast if performed (11/7/2019); IR angiogram aorta abdomen (11/6/2019); and CT angio abdomen pelvis w and or wo IV IV contrast (10/16/2022).     Social History  She reports that she quit smoking about 8 years ago. Her smoking use included cigarettes. She has been exposed to tobacco smoke. She has never used smokeless tobacco. She reports that she does not drink alcohol and does not use drugs.    Family History  Family History[1]     Allergies  Aspirin, Codeine, and Hydrocortisone    Review of Systems  Review of Systems   Respiratory:  Positive for cough.    All other systems reviewed and are negative.        Physical Exam  Physical Exam  Vitals reviewed.   Constitutional:       Appearance: Normal appearance. She is underweight.   HENT:      Head: Atraumatic.   Cardiovascular:      Rate and Rhythm: Tachycardia present.      Heart sounds: Normal heart sounds.   Pulmonary:      Effort: Pulmonary effort is normal.      Breath sounds: Normal breath sounds.   Abdominal:      General: Abdomen is flat. Bowel sounds are normal. There is no distension.      Palpations: Abdomen is soft.      Tenderness: There is no abdominal tenderness. There is no guarding or rebound.   Musculoskeletal:         General: Normal range of motion.      Cervical back: Neck supple.   Skin:     General: Skin is warm and dry.   Neurological:      General: No  focal deficit present.      Mental Status: She is alert and oriented to person, place, and time.   Psychiatric:         Mood and Affect: Mood normal.         Behavior: Behavior normal.             Last Recorded Vitals  /57 (BP Location: Right arm, Patient Position: Lying)   Pulse 104   Temp 35.8 °C (96.4 °F) (Temporal)   Resp 17   Wt (!) 43.1 kg (95 lb)   SpO2 90%     Relevant Results  Scheduled medications  Scheduled Medications[2]  Continuous medications  Continuous Medications[3]  PRN medications  PRN Medications[4]    CT chest wo IV contrast  Result Date: 6/12/2025  Interpreted By:  Irene Javed, STUDY: CT CHEST WO IV CONTRAST;  6/12/2025 1:46 pm   INDICATION: Signs/Symptoms:cough, sob, hypoxia.     COMPARISON: 11/11/2019   ACCESSION NUMBER(S): [id number]   ORDERING CLINICIAN: SUKHJINDER GALVAN   TECHNIQUE: CT of the chest was performed. Sagittal and coronal reconstructions were generated. No intravenous contrast given for the examination.   FINDINGS: Hilar, vessel, and solid organ evaluation limited without IV contrast.   CHEST WALL AND LOWER NECK: Metallic streak artifact from pacer port in the left anterior chest wall limits assessment of adjacent structures. No significant axillary lymphadenopathy as visualized. Multiple punctate calcifications in each breast.   MEDIASTINUM AND KAR:  Limited hilar assessment on unenhanced images. 1.5 cm precarinal node. No obvious hilar adenopathy within limits of unenhanced exam. Gaseous distention of the proximal esophagus. Thick-walled distal esophagus.   HEART AND VESSELS:  Lack of IV contrast precludes vascular luminal assessment. The heart is normal in size. Pericardial fluid/thickening measuring 11 mm in thickness posteriorly. Multifocal atherosclerotic calcifications including the coronary arteries. Pacer wires in the right side of the heart.   LUNGS, PLEURA, LARGE AIRWAYS:  Moderate emphysema. Central bronchial wall thickening. Patchy biapical presumed  scarring. Small ill-defined patchy/nodular infiltrates and dependent densities in both lung bases. No significant pleural effusion. The central airways are patent.   UPPER ABDOMEN:  Small fat containing medial right diaphragmatic hernia. 9 mm hypodensity in the left lobe of the liver. Intrahepatic pneumobilia. Atrophic pancreas. Calcifications/stones measuring up to 11 mm and air in the distal pancreas.  Small calcifications in the central aspect of each kidney. Rounded hypodensity in the upper pole of the right kidney. Partially included aortic stent and endograft.   BONES:  Diffuse osteopenia.  Degenerative endplate spurring in the thoracic spine.       Moderate emphysema with central bronchial wall thickening. Small patchy/nodular infiltrates in both lung bases. Follow-up to ensure resolution after appropriate treatment recommended.   Distal esophageal wall thickening. Correlate with possible esophagitis.   Enlarged lymph node in the precarinal space. Attention at follow-up recommended.   Small pericardial effusion.   Incidental findings in the upper abdomen including air in the biliary and pancreatic ductal systems and probable sizable pancreatic duct calculi.   Other findings as described above.   MACRO: None.   Signed by: Irene Javed 6/12/2025 3:09 PM Dictation workstation:   [id number]    US renal complete  Result Date: 6/12/2025  Interpreted By:  Irene Javed, STUDY: US RENAL COMPLETE;  6/12/2025 1:18 pm   INDICATION: Signs/Symptoms:MOOK.     COMPARISON: None.   ACCESSION NUMBER(S): [id number]   ORDERING CLINICIAN: ELIF GONZALES   TECHNIQUE: Multiple images of the kidneys were obtained  .   FINDINGS: RIGHT KIDNEY: Measures  9.3 cm in length. No hydronephrosis or shadowing stone demonstrated. 1.5 cm cyst in the midpole. 1.6 cm cyst in the inferior pole.   LEFT KIDNEY: Measures  8.8 cm in length. No hydronephrosis, shadowing stone or focal mass demonstrated.   BLADDER: Moderately distended and grossly  normal in contour. Uneven intraluminal echoes. Bilateral ureteral jets demonstrated.       Normal size kidneys without hydronephrosis.   Right renal cysts.   Echoes/debris in the urinary bladder. Correlation with urinalysis may be helpful.   MACRO: None.   Signed by: Irene Javed 6/12/2025 1:31 PM Dictation workstation:   [id number]    Electrocardiogram, 12-lead PRN ACS symptoms  Result Date: 6/12/2025  Atrial-sensed ventricular-paced rhythm Abnormal ECG When compared with ECG of 10-KEVIN-2025 09:47, (unconfirmed) Vent. rate has decreased BY  15 BPM    Transthoracic Echo Complete  Result Date: 6/11/2025   [facility], [address] [place] [address]              Tel [phone] and Fax [phone] TRANSTHORACIC ECHOCARDIOGRAM REPORT  Patient Name:       VIKY STANLEY SIVAKUMAR Morrissey Physician:   [id number] Ridley DO Study Date:         6/11/2025           Ordering Provider:   [id number] AMBAR RIDLEY MRN/PID:            [id number]            Fellow: Accession#:         [id number]        Nurse: Date of Birth/Age:  1935 / 89      Sonographer:         Nash Ramirez RDCS                     years Gender assigned at  F                   Additional Staff: Birth: Height:             153.00 cm           Admit Date: Weight:             43.10 kg            Admission Status:    Observation -                                                              Priority discharge BSA / BMI:          1.36 m2 / 18.41     Encounter#:          [id number]                     kg/m2 Blood Pressure:     112/58 mmHg         Department Location: [facility] Non                                                              Invasive Study Type:    TRANSTHORACIC ECHO (TTE) COMPLETE Diagnosis/ICD: Elevated Troponin-R79.89 Indication:    elevated troponin CPT Code:      Echo Limited-14486 Patient History:  Troponin Level 1:  Enzymes positive Pertinent History: CAD and HTN. Study Detail: The following Echo studies were performed: 2D, M-Mode, Doppler and               color flow.  PHYSICIAN INTERPRETATION: Left Ventricle: Left ventricular ejection fraction is mildly decreased by visual estimate at 45-50%. There is mild concentric left ventricular hypertrophy. There is global hypokinesis of the left ventricle with minor regional variations. The left ventricular cavity size is normal. There is moderately increased septal and normal posterior left ventricular wall thickness. Spectral Doppler shows an abnormal pattern of left ventricular diastolic filling due to right ventricular pacing. Left Atrium: The left atrial size is normal. Right Ventricle: The right ventricle is normal in size. There is normal right ventricular global systolic function. A device is visualized in the right ventricle. TAPSE = 17 mm. Right Atrium: The right atrial size was not assessed. There is a device visualized in the right atrium. Aortic Valve: The aortic valve is trileaflet. There is no evidence of aortic valve regurgitation. Mitral Valve: The mitral valve is mildly thickened. There is trace to mild mitral valve regurgitation. The E Vmax is 0.63 m/s. Tricuspid Valve: The tricuspid valve is structurally normal. There is mild tricuspid regurgitation. The Doppler estimated right ventricular systolic pressure (RVSP) is mildly elevated at 33 mmHg. Pulmonic Valve: The pulmonic valve is structurally normal. Pulmonic valve regurgitation was not assessed. Pericardium: Small pericardial effusion localized near the right ventricle. There is no evidence of cardiac tamponade. Aorta: The aortic root was not assessed. Systemic Veins: The inferior vena cava appears normal in size, with IVC inspiratory collapse greater than 50%. In comparison to the previous echocardiogram(s): Compared with study dated 2/21/2025,. LVEF has improved. There is now a small RV  localized pericardial effusion without tamponade physiology present.  CONCLUSIONS:  1. Left ventricular ejection fraction is mildly decreased by visual estimate at 45-50%.  2. There is global hypokinesis of the left ventricle with minor regional variations.  3. Spectral Doppler shows an abnormal pattern of left ventricular diastolic filling due to right ventricular pacing.  4. There is moderately increased septal thickness.  5. There is normal right ventricular global systolic function.  6. Small pericardial effusion.  7. There is no evidence of cardiac tamponade.  8. The Doppler estimated RVSP is mildly elevated at 33 mmHg. QUANTITATIVE DATA SUMMARY:  2D MEASUREMENTS:          Normal Ranges: IVSd:            1.29 cm  (0.6-1.1cm) LVPWd:           0.91 cm  (0.6-1.1cm) LVIDd:           3.77 cm  (3.9-5.9cm) LVIDs:           3.07 cm LV Mass Index:   98 g/m2 LVEDV Index:     44 ml/m2 LV % FS          18.5 %  LEFT ATRIUM:                  Normal Ranges: LA Vol A4C:        23.7 ml    (22+/-6mL/m2) LA Vol A2C:        36.5 ml LA Vol BP:         32.4 ml LA Vol Index A4C:  17.4ml/m2 LA Vol Index A2C:  26.7 ml/m2 LA Vol Index BP:   23.7 ml/m2 LA Area A4C:       9.6 cm2 LA Area A2C:       13.1 cm2 LA Major Axis A4C: 3.3 cm LA Major Axis A2C: 4.0 cm LA Volume Index:   23.7 ml/m2 LA Vol A4C:        19.1 ml LA Vol A2C:        32.4 ml LA Vol Index BSA:  18.9 ml/m2  LV SYSTOLIC FUNCTION:                      Normal Ranges: EF-A4C View:    30 % (>=55%) EF-A2C View:    54 % EF-Biplane:     46 % EF-Visual:      48 % LV EF Reported: 48 %  LV DIASTOLIC FUNCTION:          Normal Ranges: MV Peak E:             0.63 m/s (0.7-1.2 m/s) MV Peak A:             1.32 m/s (0.42-0.7 m/s) E/A Ratio:             0.48     (1.0-2.2)  MITRAL VALVE:          Normal Ranges: MV DT:        171 msec (150-240msec)  AORTIC VALVE:          Normal Ranges: AoV Vmax:     1.06 m/s (<=1.7m/s) AoV Peak P.5 mmHg (<20mmHg)  RIGHT VENTRICLE: TAPSE: 17.0 mm   TRICUSPID VALVE/RVSP:          Normal Ranges: Peak TR Velocity:     2.73 m/s Est. RA Pressure:     3 RV Syst Pressure:     33       (< 30mmHg) IVC Diam:             1.68 cm  [id number] Ruben Sanchez  Electronically signed on 6/11/2025 at 3:24:21 PM  ** Final **     ECG 12 lead  Result Date: 6/11/2025  Atrial-sensed ventricular-paced rhythm Abnormal ECG When compared with ECG of 08-APR-2025 10:21, Vent. rate has increased BY  40 BPM    XR chest 1 view  Result Date: 6/10/2025  Interpreted By:  Dillon Catalan, STUDY: XR CHEST 1 VIEW;  6/10/2025 10:51 am   INDICATION: Signs/Symptoms:Cough, shortness of breath.   COMPARISON: Chest x-rays, most recent from 07/04/2024. CT scan abdomen and pelvis from 10/16/2022. CT scan chest from 11/11/2019.   ACCESSION NUMBER(S): [id number]   ORDERING CLINICIAN: MATEUSZ TEJEDA   TECHNIQUE: Single AP portable view of the chest was obtained.   FINDINGS: MEDIASTINUM/ LUNGS/ KAR: Stable cardiac pacemaker on the left. Biapical pleural and parenchymal fibrotic changes.  Hyperinflation of the lungs. Prominence of central pulmonary arteries with peripheral pruning. Diffuse emphysema. Stable coarsening of lung markings. No cardiomegaly. No pleural effusion or vascular congestion. Calcification present in the aorta. No abnormal opacity in either lung worrisome for tumor or pneumonia. No pneumothorax. No tracheal deviation. No abnormal hilar fullness or gross mass on either side.   BONES: No lytic or blastic destructive bone lesion.   UPPER ABDOMEN: Grossly intact.       Stable cardiac pacemaker on the left.   Findings of underlying COPD and emphysema.   No significant or acute interval change.   MACRO: None   Signed by: Dillon Catalan 6/10/2025 11:33 AM Dictation workstation:   [id number]     Results for orders placed or performed during the hospital encounter of 06/10/25 (from the past 24 hours)   POCT GLUCOSE   Result Value Ref Range    POCT Glucose 193 (H) 74 - 99 mg/dL   Urinalysis with Reflex  Microscopic   Result Value Ref Range    Color, Urine Light-Yellow Light-Yellow, Yellow, Dark-Yellow    Appearance, Urine Turbid (N) Clear    Specific Gravity, Urine 1.014 1.005 - 1.035    pH, Urine 5.5 5.0, 5.5, 6.0, 6.5, 7.0, 7.5, 8.0    Protein, Urine 30 (1+) (A) NEGATIVE, 10 (TRACE), 20 (TRACE) mg/dL    Glucose, Urine OVER (4+) (A) Normal mg/dL    Blood, Urine 0.1 (1+) (A) NEGATIVE mg/dL    Ketones, Urine NEGATIVE NEGATIVE mg/dL    Bilirubin, Urine NEGATIVE NEGATIVE mg/dL    Urobilinogen, Urine Normal Normal mg/dL    Nitrite, Urine NEGATIVE NEGATIVE    Leukocyte Esterase, Urine 500 Paula/uL (A) NEGATIVE   Protein, Urine Random   Result Value Ref Range    Total Protein, Urine Random 91 (H) 5 - 24 mg/dL    Creatinine, Urine Random 77.1 20.0 - 320.0 mg/dL    T. Protein/Creatinine Ratio 1.18 (H) 0.00 - 0.17 mg/mg Creat   Sodium, Urine Random   Result Value Ref Range    Sodium, Urine Random 29 mmol/L    Creatinine, Urine Random 77.1 20.0 - 320.0 mg/dL    Sodium/Creatinine Ratio 38 Not established. mmol/g Creat   Microscopic Only, Urine   Result Value Ref Range    WBC, Urine >50 (A) 1-5, NONE /HPF    RBC, Urine 3-5 NONE, 1-2, 3-5 /HPF    Squamous Epithelial Cells, Urine 1-9 (SPARSE) Reference range not established. /HPF    Bacteria, Urine 1+ (A) NONE SEEN /HPF    Mucus, Urine FEW Reference range not established. /LPF   Basic Metabolic Panel   Result Value Ref Range    Glucose 141 (H) 74 - 99 mg/dL    Sodium 137 136 - 145 mmol/L    Potassium 3.7 3.5 - 5.3 mmol/L    Chloride 106 98 - 107 mmol/L    Bicarbonate 21 21 - 32 mmol/L    Anion Gap 14 10 - 20 mmol/L    Urea Nitrogen 45 (H) 6 - 23 mg/dL    Creatinine 2.51 (H) 0.50 - 1.05 mg/dL    eGFR 18 (L) >60 mL/min/1.73m*2    Calcium 8.1 (L) 8.6 - 10.3 mg/dL   PTH, Intact   Result Value Ref Range    Parathyroid Hormone, Intact 231.8 (H) 18.5 - 88.0 pg/mL   Ferritin   Result Value Ref Range    Ferritin 34 8 - 150 ng/mL   CBC   Result Value Ref Range    WBC 9.8 4.4 - 11.3 x10*3/uL     nRBC 0.0 0.0 - 0.0 /100 WBCs    RBC 3.62 (L) 4.00 - 5.20 x10*6/uL    Hemoglobin 8.3 (L) 12.0 - 16.0 g/dL    Hematocrit 28.0 (L) 36.0 - 46.0 %    MCV 77 (L) 80 - 100 fL    MCH 22.9 (L) 26.0 - 34.0 pg    MCHC 29.6 (L) 32.0 - 36.0 g/dL    RDW 15.2 (H) 11.5 - 14.5 %    Platelets 185 150 - 450 x10*3/uL   Protein, Total   Result Value Ref Range    Total Protein 5.9 (L) 6.4 - 8.2 g/dL   Hepatic function panel   Result Value Ref Range    Albumin 3.1 (L) 3.4 - 5.0 g/dL    Bilirubin, Total 0.3 0.0 - 1.2 mg/dL    Bilirubin, Direct 0.1 0.0 - 0.3 mg/dL    Alkaline Phosphatase 111 33 - 136 U/L    ALT 12 7 - 45 U/L    AST 13 9 - 39 U/L    Total Protein 5.9 (L) 6.4 - 8.2 g/dL   POCT GLUCOSE   Result Value Ref Range    POCT Glucose 156 (H) 74 - 99 mg/dL   POCT GLUCOSE   Result Value Ref Range    POCT Glucose 256 (H) 74 - 99 mg/dL   POCT GLUCOSE   Result Value Ref Range    POCT Glucose 247 (H) 74 - 99 mg/dL      Assessment/Plan     Maci Catalan is a 89 y.o. female with a PMHx s/f CAD s/p PCI in 2019, CHF, AAA s/p EVAR in 11/2019, Complete Heart Block  s/p PPM, HTN, HLD, CHF, PAD, CKD, DMT2, s/p Whipple procedure for IPMN in 2018, GI bleed, large hiatal hernia, esophagitis, esophageal/gastric/jejunal ulcers who presented to [facility] ER on 6/10/25 for cough and viral symptoms.  GI service was consulted for abnormal CT scan of the GI tract.    Esophagitis - patient denies heartburn, dysphagia, odynophagia.  Tolerates diet well.  Last EGD in 2023.  Continue home medication.  9 mm hypodensity in the left lobe of the liver - will order liver ultrasound and AFP.  Intrahepatic pneumobilia; atrophic pancreas; calcifications/stones measuring up to 11 mm and air in the distal pancreas - patient denies diarrhea with stable weight.  Will order fecal pancreatic elastase.  No other interventions as per advanced GI Dr. João Sewell.    GI will follow.    Case discussed and imaging reviewed with advanced GI [facility],  APRN-CNP             [1]   Family History  Problem Relation Name Age of Onset    Hyperlipidemia Mother      Hyperlipidemia Father     [2] [START ON 6/14/2025] ferrous sulfate, 65 mg of elemental iron, oral, Daily with breakfast  insulin lispro, 0-5 Units, subcutaneous, TID AC  ipratropium-albuteroL, 3 mL, nebulization, TID  [Held by provider] losartan, 50 mg, oral, Daily  melatonin, 9 mg, oral, Nightly  metoprolol succinate XL, 25 mg, oral, Daily  pantoprazole, 40 mg, oral, Daily before breakfast  piperacillin-tazobactam, 2.25 g, intravenous, q8h  polyethylene glycol, 17 g, oral, Daily  sodium bicarbonate, 650 mg, oral, BID  sucralfate, 1 g, oral, Before meals & nightly  [3]    [4] PRN medications: acetaminophen **OR** acetaminophen **OR** acetaminophen, guaiFENesin, ipratropium-albuteroL, nitroglycerin, oxygen, sodium chloride

## 2025-06-13 NOTE — PROGRESS NOTES
Occupational Therapy    Evaluation/Treatment    Patient Name: Maci Catalan  MRN: [id number]  Department: [place] [address]  Room: [address] [place] [address]  Today's Date: 06/13/25  Time Calculation  Start Time: 1356  Stop Time: 1421  Time Calculation (min): 25 min       Assessment:  OT Assessment: Pt appears close to baseline for ADLs and functional mobility. Pt requesting to use FWW for increased stability and safety. Pt's SP02 desats to 88-91% and heart rate 107-130 during standing tasks. RN notified.  Prognosis: Good  Barriers to Discharge Home: No anticipated barriers  Evaluation/Treatment Tolerance: Patient tolerated treatment well  Medical Staff Made Aware: Yes  End of Session Communication: Bedside nurse  End of Session Patient Position: Up in chair, Alarm on  OT Assessment Results: Decreased ADL status, Decreased endurance, Decreased IADLs, Decreased functional mobility  Prognosis: Good  Barriers to Discharge: None  Evaluation/Treatment Tolerance: Patient tolerated treatment well  Medical Staff Made Aware: Yes  Strengths: Ability to acquire knowledge, Access to adaptive/assistive products, Attitude of self, Capable of completing ADLs semi/independent  Barriers to Participation: Comorbidities  Plan:  Treatment Interventions: ADL retraining, Functional transfer training, UE strengthening/ROM, Endurance training, Compensatory technique education  OT Frequency: 3 times per week  OT Discharge Recommendations: Low intensity level of continued care  Equipment Recommended upon Discharge: Wheeled walker  OT Recommended Transfer Status: Assist of 1  OT - OK to Discharge: Yes  Treatment Interventions: ADL retraining, Functional transfer training, UE strengthening/ROM, Endurance training, Compensatory technique education    Subjective   Current Problem:  1. Acute bronchitis, unspecified organism        2. Hypoxia        3. Acute kidney injury        4. NSTEMI (non-ST elevated myocardial infarction) (Multi)        5. Elevated troponin   "Transthoracic Echo Complete    Transthoracic Echo Complete      6. Pacemaker  Transthoracic Echo Complete    Transthoracic Echo Complete      7. Non-ST elevation myocardial infarction (NSTEMI), subsequent episode of care (Multi)  Transthoracic Echo Complete    Transthoracic Echo Complete        OT Visit Info:  OT Received On: 06/13/25  General Visit Info:   General  Reason for Referral: 89 y.o. female presenting with Elevated troponin.  In the ED, she was found to be hypoxic to 90% on room air.  Was placed on nasal cannula.  She was noted to have severe hypertension with a blood pressure of 245/96 mmHg.  Referred By: KAYDEN Gonzales  Past Medical History Relevant to Rehab: Medical History[1]    Family/Caregiver Present: No  Prior to Session Communication: Bedside nurse  Patient Position Received: Bed, 3 rail up, Alarm on  Preferred Learning Style: auditory, kinesthetic  General Comment: pt pleasant and agreeable to OT, \"I feel more stable than I thought\"   Precautions:  Medical Precautions: Fall precautions     Date/Time Vitals Session Patient Position Pulse Resp SpO2 BP MAP (mmHg)    06/13/25 1356 During OT  --  130  --  91 %  --  --           Vital Signs Comment: RN notified,  at rest in chair     Pain:  Pain Assessment  Pain Assessment: 0-10  0-10 (Numeric) Pain Score: 0 - No pain    Objective   Cognition:  Overall Cognitive Status: Within Functional Limits  Orientation Level: Oriented X4  Attention: Within Functional Limits  Memory: Within Funtional Limits  Problem Solving: Within Functional Limits  Numeric Reasoning: Within Functional Limits  Abstract Reasoning: Within Functional Limits  Safety/Judgement: Within Functional Limits  Insight: Within function limits     Home Living:  Type of Home: Apartment (1st level)  Lives With: Alone  Home Adaptive Equipment: Walker rolling or standard  Home Layout: One level  Home Access: Level entry  Bathroom Shower/Tub: Tub/shower unit  Bathroom Toilet: " Handicapped height  Bathroom Equipment: Shower chair with back  Prior Function:  Level of [place]: Independent with ADLs and functional transfers, Needs assistance with homemaking  Receives Help From: Family (sister and dtr)  ADL Assistance: Independent  Homemaking Assistance: Needs assistance  Meal Prep: Independent  Laundry: Independent  Vacuuming: Independent  Cleaning: Independent  Driving/Transportation: Family/Friend (sister provides transportation or pt's dtr)  Homemaking Assistance Comments: pt completes IADLs with IND, has assist for transportation  Ambulatory Assistance: Independent (no AD)  Leisure: pt did not state  Prior Function Comments: denies falls     ADL:  LE Dressing Assistance: Stand by  LE Dressing Deficit: Don/doff R sock, Don/doff L sock    Activity Tolerance:  Endurance: Tolerates less than 10 min exercise with changes in vital signs  Activity Tolerance Comments: increased heart rate to 130, SP02 88-91%, RN notified     Bed Mobility/Transfers: Bed Mobility  Bed Mobility: Yes  Bed Mobility 1  Bed Mobility 1: Supine to sitting  Level of Assistance 1: Close supervision  Bed Mobility Comments 1: SUP for safety    Transfers  Transfer: Yes  Transfer 1  Transfer From 1: Bed to  Transfer to 1: Chair with arms  Technique 1:  (pt taking steps)  Transfer Device 1: Walker  Transfer Level of Assistance 1: Contact guard  Transfers 2  Technique 2: Sit to stand, Stand to sit  Transfer Device 2: Walker  Transfer Level of Assistance 2: Contact guard, Close supervision  Trials/Comments 2: CGA with cues for hand placement upon standing, progressing to SUP    Functional Mobility:  Functional Mobility  Functional Mobility Performed: Yes  Functional Mobility 1  Surface 1: Level tile  Device 1: Rolling walker  Assistance 1: Contact guard, Close supervision  Comments 1: pt using FWW, 1st trial with CGA, pt progressing to SUP 2nd trial, SP02 88-91%, returning to 94% with rest, pt able to tolerate min HH  distance, no report of fatigue  Sitting Balance:  Dynamic Sitting Balance  Dynamic Sitting-Balance Support: Feet supported  Dynamic Sitting-Level of Assistance: Close supervision  Standing Balance:  Dynamic Standing Balance  Dynamic Standing-Balance Support: Bilateral upper extremity supported  Dynamic Standing-Level of Assistance: Contact guard  Dynamic Standing-Balance: Turning     Vision:Vision - Basic Assessment  Current Vision: No visual deficits  Sensation:  Light Touch: No apparent deficits  Strength:  Strength Comments: fabiola UE's 4/5 MMT     Perception:  Inattention/Neglect: Appears intact  Coordination:  Movements are Fluid and Coordinated: Yes   Hand Function:  Hand Function  Gross Grasp: Functional  Coordination: Functional  Extremities: RUE   RUE : Within Functional Limits and LUE   LUE: Within Functional Limits    Outcome Measures: [facility] Daily Activity  Putting on and taking off regular lower body clothing: A little  Bathing (including washing, rinsing, drying): A little  Putting on and taking off regular upper body clothing: A little  Toileting, which includes using toilet, bedpan or urinal: A little  Taking care of personal grooming such as brushing teeth: A little  Eating Meals: None  Daily Activity - Total Score: 19    Education Documentation  Body Mechanics, taught by Laverne Hines OT at 6/13/2025  3:20 PM.  Learner: Patient  Readiness: Acceptance  Method: Explanation, Demonstration  Response: Verbalizes Understanding, Demonstrated Understanding    Precautions, taught by Laverne Hines OT at 6/13/2025  3:20 PM.  Learner: Patient  Readiness: Acceptance  Method: Explanation, Demonstration  Response: Verbalizes Understanding, Demonstrated Understanding    ADL Training, taught by Laverne Hines OT at 6/13/2025  3:20 PM.  Learner: Patient  Readiness: Acceptance  Method: Explanation, Demonstration  Response: Verbalizes Understanding, Demonstrated Understanding    Education Comments  No comments  found.           Goals:  Encounter Problems       Encounter Problems (Active)       ADLs       Patient with complete lower body dressing with modified independent level of assistance donning and doffing all LE clothes.       Start:  06/13/25    Expected End:  06/27/25            Patient will complete daily grooming tasks brushing teeth with stand by assist level of assistance and PRN adaptive equipment while standing.       Start:  06/13/25    Expected End:  06/27/25            Patient will complete toileting including hygiene clothing management/hygiene with modified independent level of assistance.       Start:  06/13/25    Expected End:  06/27/25               MOBILITY       Patient will perform Functional mobility mod  Household distances/Community Distances with stand by assist level of assistance and least restrictive device in order to improve safety and functional mobility.       Start:  06/13/25    Expected End:  06/27/25               TRANSFERS       Patient will complete sit to stand transfer with stand by assist level of assistance and least restrictive device in order to improve safety and prepare for out of bed mobility.       Start:  06/13/25    Expected End:  06/27/25                      [1]   Past Medical History:  Diagnosis Date    Personal history of other diseases of the circulatory system     History of hypertension    Personal history of other endocrine, nutritional and metabolic disease     History of high cholesterol

## 2025-06-13 NOTE — PROGRESS NOTES
06/13/25 0806   Discharge Planning   Expected Discharge Disposition Home H  (ONce med ready plan would be discharge home/ pending PT OT and SLP eval)     Once med ready plan would be discharge home, pending PT OT and SLP eval, renal and cardio clearance needed, possible [facility] on discharge, I will continue to monitor for discharge planing and needs.

## 2025-06-13 NOTE — PROGRESS NOTES
Maci Catalan is a 89 y.o. female on day 2 of admission presenting with Elevated troponin.      Subjective   Pt in bed  C/o nasal congestion  Breathing not any better    CT noted   D/w Pt  Started abx         Objective     Last Recorded Vitals  /86 (BP Location: Right arm, Patient Position: Lying)   Pulse 92   Temp 35.9 °C (96.6 °F) (Temporal)   Resp 18   Wt (!) 43.1 kg (95 lb)   SpO2 91%   Intake/Output last 3 Shifts:    Intake/Output Summary (Last 24 hours) at 6/13/2025 1244  Last data filed at 6/13/2025 0600  Gross per 24 hour   Intake 281.58 ml   Output --   Net 281.58 ml       Admission Weight  Weight: (!) 43.1 kg (95 lb) (06/10/25 0950)    Daily Weight  06/10/25 : (!) 43.1 kg (95 lb)    Image Results  CT chest wo IV contrast  Narrative: Interpreted By:  Irene Javed,   STUDY:  CT CHEST WO IV CONTRAST;  6/12/2025 1:46 pm      INDICATION:  Signs/Symptoms:cough, sob, hypoxia.          COMPARISON:  11/11/2019      ACCESSION NUMBER(S):  [id number]      ORDERING CLINICIAN:  SUKHJINDER GALVAN      TECHNIQUE:  CT of the chest was performed. Sagittal and coronal reconstructions  were generated. No intravenous contrast given for the examination.      FINDINGS:  Hilar, vessel, and solid organ evaluation limited without IV contrast.      CHEST WALL AND LOWER NECK: Metallic streak artifact from pacer port  in the left anterior chest wall limits assessment of adjacent  structures. No significant axillary lymphadenopathy as visualized.  Multiple punctate calcifications in each breast.      MEDIASTINUM AND KAR:  Limited hilar assessment on unenhanced images.  1.5 cm precarinal node. No obvious hilar adenopathy within limits of  unenhanced exam. Gaseous distention of the proximal esophagus.  Thick-walled distal esophagus.      HEART AND VESSELS:  Lack of IV contrast precludes vascular luminal  assessment. The heart is normal in size. Pericardial fluid/thickening  measuring 11 mm in thickness posteriorly.  Multifocal atherosclerotic  calcifications including the coronary arteries. Pacer wires in the  right side of the heart.      LUNGS, PLEURA, LARGE AIRWAYS:  Moderate emphysema. Central bronchial  wall thickening. Patchy biapical presumed scarring. Small ill-defined  patchy/nodular infiltrates and dependent densities in both lung  bases. No significant pleural effusion. The central airways are  patent.      UPPER ABDOMEN:  Small fat containing medial right diaphragmatic  hernia. 9 mm hypodensity in the left lobe of the liver. Intrahepatic  pneumobilia. Atrophic pancreas. Calcifications/stones measuring up to  11 mm and air in the distal pancreas.  Small calcifications in the  central aspect of each kidney. Rounded hypodensity in the upper pole  of the right kidney. Partially included aortic stent and endograft.      BONES:  Diffuse osteopenia.  Degenerative endplate spurring in the  thoracic spine.      Impression: Moderate emphysema with central bronchial wall thickening. Small  patchy/nodular infiltrates in both lung bases. Follow-up to ensure  resolution after appropriate treatment recommended.      Distal esophageal wall thickening. Correlate with possible  esophagitis.      Enlarged lymph node in the precarinal space. Attention at follow-up  recommended.      Small pericardial effusion.      Incidental findings in the upper abdomen including air in the biliary  and pancreatic ductal systems and probable sizable pancreatic duct  calculi.      Other findings as described above.      MACRO:  None.      Signed by: Irene Javed 6/12/2025 3:09 PM  Dictation workstation:   [id number]  US renal complete  Narrative: Interpreted By:  Irene Javed,   STUDY:  US RENAL COMPLETE;  6/12/2025 1:18 pm      INDICATION:  Signs/Symptoms:MOOK.          COMPARISON:  None.      ACCESSION NUMBER(S):  [id number]      ORDERING CLINICIAN:  ELIF GONZALES      TECHNIQUE:  Multiple images of the kidneys were obtained  .       FINDINGS:  RIGHT KIDNEY:  Measures  9.3 cm in length. No hydronephrosis or shadowing stone  demonstrated. 1.5 cm cyst in the midpole. 1.6 cm cyst in the inferior  pole.      LEFT KIDNEY:  Measures  8.8 cm in length. No hydronephrosis, shadowing stone or  focal mass demonstrated.      BLADDER:  Moderately distended and grossly normal in contour. Uneven  intraluminal echoes. Bilateral ureteral jets demonstrated.      Impression: Normal size kidneys without hydronephrosis.      Right renal cysts.      Echoes/debris in the urinary bladder. Correlation with urinalysis may  be helpful.      MACRO:  None.      Signed by: Irene Javed 6/12/2025 1:31 PM  Dictation workstation:   [id number]  Electrocardiogram, 12-lead PRN ACS symptoms  Atrial-sensed ventricular-paced rhythm  Abnormal ECG  When compared with ECG of 10-KEVIN-2025 09:47, (unconfirmed)  Vent. rate has decreased BY  15 BPM      Physical Exam  HENT:      Mouth/Throat:      Mouth: Mucous membranes are moist.   Eyes:      Pupils: Pupils are equal, round, and reactive to light.   Cardiovascular:      Rate and Rhythm: Normal rate.   Pulmonary:      Breath sounds: Wheezing present.   Abdominal:      General: Bowel sounds are normal.      Palpations: Abdomen is soft.   Skin:     General: Skin is warm.      Capillary Refill: Capillary refill takes 2 to 3 seconds.   Neurological:      Mental Status: She is alert and oriented to person, place, and time.         Relevant Results             Medications Ordered Prior to Encounter[1]    Results for orders placed or performed during the hospital encounter of 06/10/25 (from the past 24 hours)   POCT GLUCOSE   Result Value Ref Range    POCT Glucose 403 (H) 74 - 99 mg/dL   POCT GLUCOSE   Result Value Ref Range    POCT Glucose 193 (H) 74 - 99 mg/dL   Urinalysis with Reflex Microscopic   Result Value Ref Range    Color, Urine Light-Yellow Light-Yellow, Yellow, Dark-Yellow    Appearance, Urine Turbid (N) Clear    Specific Gravity,  Urine 1.014 1.005 - 1.035    pH, Urine 5.5 5.0, 5.5, 6.0, 6.5, 7.0, 7.5, 8.0    Protein, Urine 30 (1+) (A) NEGATIVE, 10 (TRACE), 20 (TRACE) mg/dL    Glucose, Urine OVER (4+) (A) Normal mg/dL    Blood, Urine 0.1 (1+) (A) NEGATIVE mg/dL    Ketones, Urine NEGATIVE NEGATIVE mg/dL    Bilirubin, Urine NEGATIVE NEGATIVE mg/dL    Urobilinogen, Urine Normal Normal mg/dL    Nitrite, Urine NEGATIVE NEGATIVE    Leukocyte Esterase, Urine 500 Paula/uL (A) NEGATIVE   Sodium, Urine Random   Result Value Ref Range    Sodium, Urine Random 29 mmol/L    Creatinine, Urine Random 77.1 20.0 - 320.0 mg/dL    Sodium/Creatinine Ratio 38 Not established. mmol/g Creat   Microscopic Only, Urine   Result Value Ref Range    WBC, Urine >50 (A) 1-5, NONE /HPF    RBC, Urine 3-5 NONE, 1-2, 3-5 /HPF    Squamous Epithelial Cells, Urine 1-9 (SPARSE) Reference range not established. /HPF    Bacteria, Urine 1+ (A) NONE SEEN /HPF    Mucus, Urine FEW Reference range not established. /LPF   Basic Metabolic Panel   Result Value Ref Range    Glucose 141 (H) 74 - 99 mg/dL    Sodium 137 136 - 145 mmol/L    Potassium 3.7 3.5 - 5.3 mmol/L    Chloride 106 98 - 107 mmol/L    Bicarbonate 21 21 - 32 mmol/L    Anion Gap 14 10 - 20 mmol/L    Urea Nitrogen 45 (H) 6 - 23 mg/dL    Creatinine 2.51 (H) 0.50 - 1.05 mg/dL    eGFR 18 (L) >60 mL/min/1.73m*2    Calcium 8.1 (L) 8.6 - 10.3 mg/dL   PTH, Intact   Result Value Ref Range    Parathyroid Hormone, Intact 231.8 (H) 18.5 - 88.0 pg/mL   Ferritin   Result Value Ref Range    Ferritin 34 8 - 150 ng/mL   CBC   Result Value Ref Range    WBC 9.8 4.4 - 11.3 x10*3/uL    nRBC 0.0 0.0 - 0.0 /100 WBCs    RBC 3.62 (L) 4.00 - 5.20 x10*6/uL    Hemoglobin 8.3 (L) 12.0 - 16.0 g/dL    Hematocrit 28.0 (L) 36.0 - 46.0 %    MCV 77 (L) 80 - 100 fL    MCH 22.9 (L) 26.0 - 34.0 pg    MCHC 29.6 (L) 32.0 - 36.0 g/dL    RDW 15.2 (H) 11.5 - 14.5 %    Platelets 185 150 - 450 x10*3/uL   POCT GLUCOSE   Result Value Ref Range    POCT Glucose 156 (H) 74 - 99  mg/dL   POCT GLUCOSE   Result Value Ref Range    POCT Glucose 256 (H) 74 - 99 mg/dL         Intake/Output Summary (Last 24 hours) at 6/13/2025 1244  Last data filed at 6/13/2025 0600  Gross per 24 hour   Intake 281.58 ml   Output --   Net 281.58 ml       Vitals:    06/10/25 0950   Weight: (!) 43.1 kg (95 lb)             Assessment & Plan  Elevated troponin   HTN, uncontrolled   -Consult cardiology. Input noted Cont heparin gtt  -Tele       Acute bronchitis, unspecified organism  Persistant cough  Worse w po  Speech eval, stable      Check ct chest >> noted  Start IV abx  Consult GI  Anti tussives      Luis Enrique/ckd3  Renal on board  IVFs  Trend labs    Benign essential HTN    3-vessel coronary artery disease    Chronic systolic congestive heart failure    Diabetes mellitus type 2, noninsulin dependent (Multi)    Hyperglycemia       PTOT    -Continue current treatment as ordered. Will make adjustments as necessary.    VTE Prophylaxis  -See Orders    Plan of care discussed with: Provider, RN, Patient      Patient case and plan of care discussed with Dr. JACQUELYN Hendrix.    GLENIS Norris - CNP  -In collaboration with Dr. JACQUELYN Hendrix    [facility]  Office: [phone]  Fax: [phone]  .   Pt seen this evening   She is feeling better  Does have cough   No chest pain   Chest fair air entry fabiola   CT chest with emphysema and patchy infiltrate fabiola   Pneumobilia     Cardio signed off  Renal following   DM hba1c pending cont with sliding scale   Ot and pt   Consult pulm for copd           [1]   No current facility-administered medications on file prior to encounter.     Current Outpatient Medications on File Prior to Encounter   Medication Sig Dispense Refill    acetaminophen (Tylenol) 325 mg tablet Take 2 tablets (650 mg) by mouth every 4 hours if needed for mild pain (1 - 3) or moderate pain (4 - 6).      empagliflozin (Jardiance) 10 mg Take 1 tablet (10 mg) by mouth once daily. 90 tablet 3     famotidine (Pepcid) 20 mg tablet Take 1 tablet (20 mg) by mouth once daily.      furosemide (Lasix) 40 mg tablet Take 1 tablet (40 mg) by mouth once daily. Take with Meals 100 tablet 3    losartan (Cozaar) 50 mg tablet Take 1 tablet (50 mg) by mouth once daily. 90 tablet 3    melatonin 10 mg tablet Take 1 tablet (10 mg) by mouth once daily at bedtime.      metFORMIN (Glucophage) 500 mg tablet Take 1 tablet (500 mg) by mouth every 12 hours. With food      metoprolol succinate XL (Toprol-XL) 25 mg 24 hr tablet Take 1 tablet (25 mg) by mouth once daily. Do not crush or chew. 90 tablet 3    sucralfate (Carafate) 1 gram tablet Take 1 tablet (1 g) by mouth 4 times a day.      nitroglycerin (Nitrostat) 0.4 mg SL tablet Place 1 tablet (0.4 mg) under the tongue every 5 minutes if needed for chest pain (UP TO 3 DOSES AS NEEDED FOR CHEST PAIN.CALL 911 IF PAIN PERSISTS.).

## 2025-06-13 NOTE — PROGRESS NOTES
Speech-Language Pathology    SLP Adult Inpatient Speech-Language Pathology Clinical Swallow Evaluation    Patient Name: Maci Catalan  MRN: [id number]  Today's Date: 6/13/2025   Time Calculation  Start Time: 1114  Stop Time: 1125  Time Calculation (min): 11 min         Current Problem:   1. Acute bronchitis, unspecified organism        2. Hypoxia        3. Acute kidney injury        4. NSTEMI (non-ST elevated myocardial infarction) (Multi)        5. Elevated troponin  Transthoracic Echo Complete    Transthoracic Echo Complete      6. Pacemaker  Transthoracic Echo Complete    Transthoracic Echo Complete      7. Non-ST elevation myocardial infarction (NSTEMI), subsequent episode of care (Multi)  Transthoracic Echo Complete    Transthoracic Echo Complete            Recommendations:  Solid Diet Recommendations : Regular (IDDSI Level 7)  Liquid Diet Recommendations: Thin (IDDSI Level 0)  Compensatory Swallowing Strategies: Upright 90 degrees as possible for all oral intake, Remain upright for 20-30 minutes after meals, Alternate solids and liquids  Medication Administration Recommendations:  As preferred      Assessment:  Assessment Results: Pt presents with WFL oral swallow, no e/o pharyngeal deficits or overt s/s airway aspiration noted during evaluation. Pt remains at increased risk for aspiration given current respiratory status and underlying conditions (COPD), however no further ST indicated at this time. Please re-c/s as needed.  Medical Staff Made Aware: Yes  Strengths: Cognition, Motivation      Plan:  SLP Plan: No skilled SLP  No Skilled SLP: No acute SLP goals identified  Patient/Caregiver Agreeable: Yes  SLP - OK to Discharge: Yes      Subjective   Current Problem:  Pt admitted for flu-like sx, including c/o sore throat and cough for past few days.      General Visit Information:  Patient Class: Inpatient  Living Environment: Home, Lives alone  Caregiver Feedback: Nsg reports Pt took pills whole with liquid  wash this morning, without difficulties.  Ordering Physician: VARUN Wu CNP  Reason for Referral: Swallow Eval  Past Medical History Relevant to Rehab: HTN, CAD, chronic CHF, DM2, AAA, CRI, COPD, erosive gastritis, GERD, evan nodule  Prior to Session Communication: Bedside nurse, Physician  Date of Order: 06/12/25  [employer] Diet: Regular  Current Diet : Regular        Objective     Baseline Assessment:  Respiratory Status: Oxygen via nasal cannula  Behavior/Cognition: Alert, Cooperative, Pleasant mood  Vision: Functional for self-feeding  Hearing: Within Functional Limits  Patient Positioning: Upright in Bed  Baseline Vocal Quality: Normal      Pain:  Pt did not report or demo evidence of pain during this session, however did c/o discomfort d/t sore throat. Pt did not rate level of discomfort.      Oral/Motor Assessment:  Oral Hygiene: WFL  Dentition: Complete Dentures  Oral Motor: Within Functional Limits  Facial Symmetry: Within Functional Limits  Intelligibility: Intelligible  Breath Support: Adequate for speech  Hearing: Within Functional Limits      Consistencies Trialed:  Consistencies Trialed: Yes  Pt fed herself all PO trials, including 3oz Swallow Protocol, additional approx 4-5 sips thin/water by straw, 1-2oz puree, and 1/4 jade cracker.      Clinical Observations:  Management of Oral Secretions: Adequate  Suck Swallow Breathe Coordination: Functional  Was The 3 oz Swallow Protocol Completed: Yes (Passed)    No oral residue noted after swallow  No overt s/s airway aspiration noted      Patient Education:  Pt reports comprehension of all information discussed, including aspiration risks/precautions (including concern for pneumonia), safe swallow strategies, and diet rec'ds.  Pt asked appropriate questions and reports will inform team if sx develop.        Recent imaging pertinent to this evaluation (see chart for further details):   6/12 CT chest:  IMPRESSION:  Moderate emphysema with central bronchial wall  thickening. Small patchy/nodular infiltrates in both lung bases. Follow-up to ensure resolution after appropriate treatment recommended.      Distal esophageal wall thickening. Correlate with possible  esophagitis.      Enlarged lymph node in the precarinal space. Attention at follow-up recommended.      Small pericardial effusion.      Incidental findings in the upper abdomen including air in the biliary and pancreatic ductal systems and probable sizable pancreatic duct calculi.       6/10 CXR:  IMPRESSION:  Stable cardiac pacemaker on the left.      Findings of underlying COPD and emphysema.      No significant or acute interval change.

## 2025-06-13 NOTE — PROGRESS NOTES
"Subjective Data:  Patient seen and examined, chart reviewed  No acute events reported, recorded  -- Blood pressures remain well-controlled  -- Breathing had been improving, notes that she \"feels dried out, congested\" on non humidified oxygen  --          Objective Data:  Last Recorded Vitals:  Vitals:    06/13/25 0815 06/13/25 1203 06/13/25 1239 06/13/25 1356   BP: 140/59 105/86     BP Location: Right arm Right arm     Patient Position: Lying Lying     Pulse: 90 92  (!) 130   Resp: 18 18     Temp: 36.5 °C (97.7 °F) 35.9 °C (96.6 °F)     TempSrc: Temporal Temporal     SpO2: 90% 91% 93% 91%   Weight:       Height:           Last Labs:  CBC - 6/13/2025:  5:06 AM  9.8 8.3 185    28.0      CMP - 6/13/2025:  5:06 AM  8.1 5.9; 5.9 13 --- 0.3   _ 3.1 12 111      PTT - 6/10/2025:  2:55 PM  1.0   11.7 30     TROPHS   Date/Time Value Ref Range Status   06/11/2025 02:58  0 - 13 ng/L Final   06/11/2025 10:10  0 - 13 ng/L Final     Comment:     Previous result verified on 6/10/2025 1151 on specimen/case [id number] called with component TRPHS for procedure Troponin I, High Sensitivity with value 54 ng/L.   06/11/2025 04:37  0 - 13 ng/L Final     Comment:     Previous result verified on 6/10/2025 1151 on specimen/case [id number] called with component TRPHS for procedure Troponin I, High Sensitivity with value 54 ng/L.     BNP   Date/Time Value Ref Range Status   06/10/2025 10:09  0 - 99 pg/mL Final   03/25/2024 12:05  0 - 99 pg/mL Final     HGBA1C   Date/Time Value Ref Range Status   03/25/2024 12:05 PM 8.2 see below % Final   06/14/2022 11:49 AM 7.4 % Final     Comment:          Diagnosis of Diabetes-Adults   Non-Diabetic: < or = 5.6%   Increased risk for developing diabetes: 5.7-6.4%   Diagnostic of diabetes: > or = 6.5%  .       Monitoring of Diabetes                Age (y)     Therapeutic Goal (%)   Adults:          >18           <7.0   Pediatrics:    13-18           <7.5                  "  7-12           <8.0                   0- 6            7.5-8.5   American Diabetes Association. Diabetes Care 33(S1), Jan 2010.     02/02/2022 10:08 AM 5.4 % Final     Comment:          Diagnosis of Diabetes-Adults   Non-Diabetic: < or = 5.6%   Increased risk for developing diabetes: 5.7-6.4%   Diagnostic of diabetes: > or = 6.5%  .       Monitoring of Diabetes                Age (y)     Therapeutic Goal (%)   Adults:          >18           <7.0   Pediatrics:    13-18           <7.5                   7-12           <8.0                   0- 6            7.5-8.5   American Diabetes Association. Diabetes Care 33(S1), Jan 2010.     06/01/2021 10:41 AM 7.4 4.0 - 5.6 % Final   03/02/2021 10:42 AM 8.4 4.0 - 5.6 % Final     LDLCALC   Date/Time Value Ref Range Status   06/11/2025 04:37 AM 74 <=99 mg/dL Final     Comment:                                 Near   Borderline      AGE      Desirable  Optimal    High     High     Very High     0-19 Y     0 - 109     ---    110-129   >/= 130     ----    20-24 Y     0 - 119     ---    120-159   >/= 160     ----      >24 Y     0 -  99   100-129  130-159   160-189     >/=190     03/25/2024 12:05  <=99 mg/dL Final     Comment:                                 Near   Borderline      AGE      Desirable  Optimal    High     High     Very High     0-19 Y     0 - 109     ---    110-129   >/= 130     ----    20-24 Y     0 - 119     ---    120-159   >/= 160     ----      >24 Y     0 -  99   100-129  130-159   160-189     >/=190       VLDL   Date/Time Value Ref Range Status   06/11/2025 04:37 AM 16 0 - 40 mg/dL Final   03/25/2024 12:05 PM 47 0 - 40 mg/dL Final   11/10/2021 11:00 AM 38 0 - 40 mg/dL Final   01/11/2021 11:06 AM 37 0 - 40 mg/dL Final   11/11/2019 08:30 AM 42 0 - 40 mg/dL Final      Last I/O:  I/O last 3 completed shifts:  In: 281.6 (6.5 mL/kg) [P.O.:50; I.V.:231.6 (5.4 mL/kg)]  Out: - (0 mL/kg)   Weight: 43.1 kg     Past Cardiology Tests (Last 3  Years):  EKG:  Electrocardiogram, 12-lead PRN ACS symptoms 06/11/2025 (Preliminary)      ECG 12 lead 06/10/2025 (Preliminary)      ECG 12 lead (Clinic Performed) 04/08/2025      ECG 12 lead (Clinic Performed) 03/03/2025      ECG 12 lead (Clinic Performed) 01/28/2025      ECG 12 lead (Clinic Performed) 09/11/2024      ECG 12 Lead 07/04/2024      ECG 12 lead (Clinic Performed) 04/19/2024      ECG 12 lead (Ancillary Performed) 10/10/2023    Echo:  Transthoracic Echo Complete 06/11/2025      Transthoracic echo (TTE) complete 02/11/2025    Ejection Fractions:  EF   Date/Time Value Ref Range Status   06/11/2025 11:34 AM 48 %    02/11/2025 11:58 AM 35 %      Cath:  No results found for this or any previous visit from the past 1095 days.    Stress Test:  No results found for this or any previous visit from the past 1095 days.    Cardiac Imaging:  No results found for this or any previous visit from the past 1095 days.      Inpatient Medications:  Scheduled Medications[1]  PRN Medications[2]  Continuous Medications[3]    Physical Exam:  Documented Vital Signs   Heart Rate:  []   Temp:  [35.9 °C (96.6 °F)-36.7 °C (98.1 °F)]   Resp:  [18-19]   BP: (105-140)/(45-86)   SpO2:  [90 %-93 %]   Temp:  [35.9 °C (96.6 °F)-36.7 °C (98.1 °F)] 35.9 °C (96.6 °F)  Heart Rate:  [] 130  Resp:  [18-19] 18  BP: (105-140)/(45-86) 105/86      Oxygen Dose: *1 L/min    Documented Fluid Status     Intake/Output Summary (Last 24 hours) at 6/13/2025 1530  Last data filed at 6/13/2025 0600  Gross per 24 hour   Intake 50 ml   Output --   Net 50 ml     Net IO Since Admission: 521.58 mL [06/13/25 1530]       Assessment/Plan   esha Catalan is a 89 y.o. female who has a pertinent medical history including, Hx of Perioperative NSTEMI ( Occuring in setting of EVAR 11/2019, Troponin Peak 18.45 ) three-vessel coronary artery disease S/P PCI (11/22/2019) to proximal LAD with a 2.75 x 12 mm Resolute [place] drug-eluting stent postdilated up to 3.5 mm  ") Chronic Systolic Heart Failure with LVEF 35% ( 11/2019 ) with Mid and apical anterior septum, mid and apical inferior septum, and basal and mid inferior wall motion abnormalities, HX of AAA S/P EVAR 11/2019 ) Hx of Complete Heart Block S/P Med[employer]onic Dual Chamber System, and Hx of Pancreatic Tumor S/P [facility] who presented to the Emergency department with complaints of Cough X 1 week.  Cardiology has been consulted for \"Elevated Troponin, Nstemi\"  A full hospital course review was completed.     States that she has been in her usual state of health up until about 1 week ago.  She had significant coughing, sneezing, runny nose.  She denies tightness of the chest, nor heaviness.  She notes that her rhinorrhea was so intense, that she used up an entire \"box of Kleenex in 1 day\".  Continuing to feel unwell, she presented to the emergency department for further evaluation.  On arrival, blood pressure is documented to be extreme At 245/96 with a heart rate of 107 respiratory rate 17 saturating 90% on room air.  Roughly 10 minutes later, blood pressure was down to 148 and has remained normotensive.  She was placed on 2 L of nasal cannula.  She reports that she is already feeling better.     # Elevated high-sensitivity cardiac troponin  -- Etiology unclear as to whether or not this is a true ACS event versus acute myocardial injury in the setting of viral illness. Her echocardiogram showed improved LVEF and interval development of a RV localized pericardial effusion. Suspicion for Pericarditis as culprit for troponin elevation and chest pain  -- For now, reasonable to continue heparin X 48 hours   -- Given GFR will need to hold off on usual treatment ( High dose Colchicine / High Dose NSAID ) for now  -- Ok to use PO Analagesia ( Oxycodone etc. ) for inpatient treatment      # Hypertensive urgency present on admission  -- Unclear if this is truly her blood pressure is documented or rather clerical entry issue.  " Historically, she has not had dramatically elevated blood pressures in the outpatient setting.  Her EMS records are not currently available to ascertain whether she was hypertensive on initial EMS encounter  -- For now, continue to trend.  Continue chronic medications.    At this time, she remains stable from a cardiac standpoint.  She has completed 48 hours of heparin therapy at this time.  -- Continue current medications and management as per primary service  -- No cardiac barriers to discharge at this time  -- We will see her in follow-up.    Cardiology signing off         Peripheral IV 06/10/25 22 G Right Antecubital (Active)   Site Assessment Clean;Dry;Intact 06/13/25 0824   Dressing Status Clean;Dry 06/13/25 0824   Number of days: 3       Peripheral IV 06/10/25 22 G Anterior;Left Forearm (Active)   Site Assessment Clean;Dry;Intact 06/13/25 0824   Dressing Status Clean;Dry 06/13/25 0824   Number of days: 3       Code Status:  Full Code      Ruben Sanchez DO   Division of Cardiovascular Medicine  [facility] [place]             [1]   Scheduled medications   Medication Dose Route Frequency    [START ON 6/14/2025] ferrous sulfate  65 mg of elemental iron oral Daily with breakfast    insulin lispro  0-5 Units subcutaneous TID AC    ipratropium-albuteroL  3 mL nebulization TID    [Held by provider] losartan  50 mg oral Daily    melatonin  9 mg oral Nightly    metoprolol succinate XL  25 mg oral Daily    pantoprazole  40 mg oral Daily before breakfast    piperacillin-tazobactam  2.25 g intravenous q8h    polyethylene glycol  17 g oral Daily    sodium bicarbonate  650 mg oral BID    sucralfate  1 g oral Before meals & nightly   [2]   PRN medications   Medication    acetaminophen    Or    acetaminophen    Or    acetaminophen    guaiFENesin    ipratropium-albuteroL    nitroglycerin    oxygen    sodium chloride   [3]   Continuous Medications   Medication Dose Last Rate

## 2025-06-13 NOTE — CARE PLAN
The patient's goals for the shift include      The clinical goals for the shift include Pt will be free from SOB,keep safe      Problem: Safety - Adult  Goal: Free from fall injury  Outcome: Progressing     Problem: Chronic Conditions and Co-morbidities  Goal: Patient's chronic conditions and co-morbidity symptoms are monitored and maintained or improved  Outcome: Progressing

## 2025-06-14 LAB
AFP SERPL-MCNC: 5 NG/ML (ref 0–9)
ANION GAP SERPL CALC-SCNC: 15 MMOL/L (ref 10–20)
BUN SERPL-MCNC: 41 MG/DL (ref 6–23)
CALCIUM SERPL-MCNC: 7.9 MG/DL (ref 8.6–10.3)
CHLORIDE SERPL-SCNC: 108 MMOL/L (ref 98–107)
CO2 SERPL-SCNC: 20 MMOL/L (ref 21–32)
CREAT SERPL-MCNC: 2.43 MG/DL (ref 0.5–1.05)
EGFRCR SERPLBLD CKD-EPI 2021: 19 ML/MIN/1.73M*2
ERYTHROCYTE [DISTWIDTH] IN BLOOD BY AUTOMATED COUNT: 15.1 % (ref 11.5–14.5)
GLUCOSE BLD MANUAL STRIP-MCNC: 161 MG/DL (ref 74–99)
GLUCOSE BLD MANUAL STRIP-MCNC: 175 MG/DL (ref 74–99)
GLUCOSE BLD MANUAL STRIP-MCNC: 187 MG/DL (ref 74–99)
GLUCOSE BLD MANUAL STRIP-MCNC: 340 MG/DL (ref 74–99)
GLUCOSE SERPL-MCNC: 199 MG/DL (ref 74–99)
HCT VFR BLD AUTO: 26 % (ref 36–46)
HGB BLD-MCNC: 8.4 G/DL (ref 12–16)
MCH RBC QN AUTO: 23.1 PG (ref 26–34)
MCHC RBC AUTO-ENTMCNC: 32.3 G/DL (ref 32–36)
MCV RBC AUTO: 71 FL (ref 80–100)
NRBC BLD-RTO: 0 /100 WBCS (ref 0–0)
PLATELET # BLD AUTO: 178 X10*3/UL (ref 150–450)
POTASSIUM SERPL-SCNC: 3.6 MMOL/L (ref 3.5–5.3)
RBC # BLD AUTO: 3.64 X10*6/UL (ref 4–5.2)
SODIUM SERPL-SCNC: 139 MMOL/L (ref 136–145)
WBC # BLD AUTO: 9.4 X10*3/UL (ref 4.4–11.3)

## 2025-06-14 PROCEDURE — 2500000001 HC RX 250 WO HCPCS SELF ADMINISTERED DRUGS (ALT 637 FOR MEDICARE OP): Performed by: NURSE PRACTITIONER

## 2025-06-14 PROCEDURE — 2500000002 HC RX 250 W HCPCS SELF ADMINISTERED DRUGS (ALT 637 FOR MEDICARE OP, ALT 636 FOR OP/ED): Performed by: FAMILY MEDICINE

## 2025-06-14 PROCEDURE — 99222 1ST HOSP IP/OBS MODERATE 55: CPT | Performed by: INTERNAL MEDICINE

## 2025-06-14 PROCEDURE — 2500000005 HC RX 250 GENERAL PHARMACY W/O HCPCS: Performed by: FAMILY MEDICINE

## 2025-06-14 PROCEDURE — 2500000004 HC RX 250 GENERAL PHARMACY W/ HCPCS (ALT 636 FOR OP/ED): Performed by: INTERNAL MEDICINE

## 2025-06-14 PROCEDURE — 82947 ASSAY GLUCOSE BLOOD QUANT: CPT

## 2025-06-14 PROCEDURE — 36415 COLL VENOUS BLD VENIPUNCTURE: CPT | Performed by: FAMILY MEDICINE

## 2025-06-14 PROCEDURE — 2500000004 HC RX 250 GENERAL PHARMACY W/ HCPCS (ALT 636 FOR OP/ED): Performed by: NURSE PRACTITIONER

## 2025-06-14 PROCEDURE — 2500000002 HC RX 250 W HCPCS SELF ADMINISTERED DRUGS (ALT 637 FOR MEDICARE OP, ALT 636 FOR OP/ED): Performed by: NURSE PRACTITIONER

## 2025-06-14 PROCEDURE — 2500000005 HC RX 250 GENERAL PHARMACY W/O HCPCS: Performed by: NURSE PRACTITIONER

## 2025-06-14 PROCEDURE — 99232 SBSQ HOSP IP/OBS MODERATE 35: CPT | Performed by: INTERNAL MEDICINE

## 2025-06-14 PROCEDURE — 2500000001 HC RX 250 WO HCPCS SELF ADMINISTERED DRUGS (ALT 637 FOR MEDICARE OP): Performed by: INTERNAL MEDICINE

## 2025-06-14 PROCEDURE — 1200000002 HC GENERAL ROOM WITH TELEMETRY DAILY

## 2025-06-14 PROCEDURE — 80048 BASIC METABOLIC PNL TOTAL CA: CPT | Performed by: NURSE PRACTITIONER

## 2025-06-14 PROCEDURE — 94640 AIRWAY INHALATION TREATMENT: CPT

## 2025-06-14 PROCEDURE — 85027 COMPLETE CBC AUTOMATED: CPT | Performed by: FAMILY MEDICINE

## 2025-06-14 RX ADMIN — SUCRALFATE 1 G: 1 TABLET ORAL at 22:42

## 2025-06-14 RX ADMIN — INSULIN LISPRO 1 UNITS: 100 INJECTION, SOLUTION INTRAVENOUS; SUBCUTANEOUS at 11:59

## 2025-06-14 RX ADMIN — Medication 9 MG: at 20:18

## 2025-06-14 RX ADMIN — IPRATROPIUM BROMIDE AND ALBUTEROL SULFATE 3 ML: 2.5; .5 SOLUTION RESPIRATORY (INHALATION) at 08:23

## 2025-06-14 RX ADMIN — PANTOPRAZOLE SODIUM 40 MG: 40 TABLET, DELAYED RELEASE ORAL at 05:07

## 2025-06-14 RX ADMIN — IPRATROPIUM BROMIDE AND ALBUTEROL SULFATE 3 ML: 2.5; .5 SOLUTION RESPIRATORY (INHALATION) at 12:48

## 2025-06-14 RX ADMIN — INSULIN LISPRO 1 UNITS: 100 INJECTION, SOLUTION INTRAVENOUS; SUBCUTANEOUS at 08:55

## 2025-06-14 RX ADMIN — Medication 1 L/MIN: at 12:48

## 2025-06-14 RX ADMIN — SODIUM BICARBONATE 650 MG: 650 TABLET ORAL at 20:18

## 2025-06-14 RX ADMIN — ACETAMINOPHEN 650 MG: 325 TABLET, FILM COATED ORAL at 11:58

## 2025-06-14 RX ADMIN — Medication 1 L/MIN: at 19:23

## 2025-06-14 RX ADMIN — PIPERACILLIN SODIUM AND TAZOBACTAM SODIUM 2.25 G: 2; .25 INJECTION, SOLUTION INTRAVENOUS at 05:07

## 2025-06-14 RX ADMIN — SUCRALFATE 1 G: 1 TABLET ORAL at 05:07

## 2025-06-14 RX ADMIN — METOPROLOL SUCCINATE 25 MG: 25 TABLET, EXTENDED RELEASE ORAL at 08:55

## 2025-06-14 RX ADMIN — INSULIN LISPRO 1 UNITS: 100 INJECTION, SOLUTION INTRAVENOUS; SUBCUTANEOUS at 16:21

## 2025-06-14 RX ADMIN — SUCRALFATE 1 G: 1 TABLET ORAL at 16:21

## 2025-06-14 RX ADMIN — METHYLPREDNISOLONE SODIUM SUCCINATE 40 MG: 125 INJECTION, POWDER, FOR SOLUTION INTRAMUSCULAR; INTRAVENOUS at 15:09

## 2025-06-14 RX ADMIN — SUCRALFATE 1 G: 1 TABLET ORAL at 11:58

## 2025-06-14 RX ADMIN — FERROUS SULFATE TAB 325 MG (65 MG ELEMENTAL FE) 1 TABLET: 325 (65 FE) TAB at 08:55

## 2025-06-14 RX ADMIN — Medication 1 L/MIN: at 08:23

## 2025-06-14 RX ADMIN — SODIUM BICARBONATE 650 MG: 650 TABLET ORAL at 08:55

## 2025-06-14 RX ADMIN — PIPERACILLIN SODIUM AND TAZOBACTAM SODIUM 2.25 G: 2; .25 INJECTION, SOLUTION INTRAVENOUS at 22:42

## 2025-06-14 RX ADMIN — PIPERACILLIN SODIUM AND TAZOBACTAM SODIUM 2.25 G: 2; .25 INJECTION, SOLUTION INTRAVENOUS at 14:48

## 2025-06-14 RX ADMIN — IPRATROPIUM BROMIDE AND ALBUTEROL SULFATE 3 ML: 2.5; .5 SOLUTION RESPIRATORY (INHALATION) at 19:23

## 2025-06-14 ASSESSMENT — COGNITIVE AND FUNCTIONAL STATUS - GENERAL
MOBILITY SCORE: 24
DAILY ACTIVITIY SCORE: 24
DAILY ACTIVITIY SCORE: 24
MOBILITY SCORE: 24

## 2025-06-14 ASSESSMENT — ENCOUNTER SYMPTOMS
RHINORRHEA: 1
DYSPHORIC MOOD: 0
ABDOMINAL PAIN: 0
DYSURIA: 0
FATIGUE: 0
DIARRHEA: 1
NERVOUS/ANXIOUS: 0
CHEST TIGHTNESS: 1
MYALGIAS: 0
NECK PAIN: 0
WOUND: 0
ARTHRALGIAS: 0
HEADACHES: 0
SINUS PRESSURE: 0
PALPITATIONS: 0
LIGHT-HEADEDNESS: 0
WHEEZING: 0
HEMATURIA: 0
SINUS PAIN: 0
SEIZURES: 0
NAUSEA: 0
COUGH: 1
CHILLS: 0
UNEXPECTED WEIGHT CHANGE: 0
SHORTNESS OF BREATH: 1
DIZZINESS: 0
APPETITE CHANGE: 0
FEVER: 0
CONFUSION: 0
EYE PAIN: 0
BACK PAIN: 1
VOMITING: 0
CONSTIPATION: 0
EYE REDNESS: 0
EYE ITCHING: 1
FREQUENCY: 0

## 2025-06-14 ASSESSMENT — PAIN SCALES - GENERAL
PAINLEVEL_OUTOF10: 0 - NO PAIN
PAINLEVEL_OUTOF10: 0 - NO PAIN

## 2025-06-14 ASSESSMENT — PAIN - FUNCTIONAL ASSESSMENT: PAIN_FUNCTIONAL_ASSESSMENT: 0-10

## 2025-06-14 NOTE — CONSULTS
Inpatient consult to Pulmonology  Consult performed by: Mirta Lee MD  Consult ordered by: Jacky Hendrix MD    Reason For Consult  OOPD   History Of Present Illness  Maci Catalan is a 89 y.o. female with h/o HTN, DLP, CAD s/p PCI, HFrED, AAA s/p EVAR in 2019, complete heart block s/p PPM, PAD, CKD, T2DM, hiatal hernia, esophagitis, GI bleed due to PUD, pancreatic cancer s/p Whipple procedure, who p/w cough and URI symptoms x 1 week. In the ED found to be hypoxic requiring supplemental O2. Work up also revealed MOOK on CKD, very high BP consistent with hypertensive urgency/emergency, elevated troponin and concern for emphysema on chest imaging. Admitted to [facility] for NSTEMI, MOOK on CK and blood pressure control. Pulmonary is consulted for COPD.   States she developed a gradual onset, progressive exertional SOB associated with substernal chest tightness a weeks ago. Chest tightness 7/10 pressure like, would last for a few minutes and would be relieved with resting. Denies N/V. No orthopnea, PND, or LE edema. Denies associated wheezing. Baseline no CHRISTIANSON after walking 1 city block or climbing 1 FOS.   Symptoms also included cough, productive of clear sputum, sneezing,   Full ROS as below.   Symptoms are now improved.   Past Medical History  Past Medical History:   Diagnosis Date    Personal history of other diseases of the circulatory system     History of hypertension    Personal history of other endocrine, nutritional and metabolic disease     History of high cholesterol   Surgical History  Past Surgical History:   Procedure Laterality Date    CHOLECYSTECTOMY  06/19/2018    Cholecystectomy    CT ABDOMEN PELVIS ANGIOGRAM W AND/OR WO IV CONTRAST  8/16/2019    CT ABDOMEN PELVIS ANGIOGRAM W AND/OR WO IV CONTRAST [place] [facility] [place]    CT ABDOMEN PELVIS ANGIOGRAM W AND/OR WO IV CONTRAST  10/16/2022    CT ABDOMEN PELVIS ANGIOGRAM W AND/OR WO IV CONTRAST [facility]/20[facility]    CT ANGIO AORTA  AND BILATERAL ILIOFEMORAL RUN OFF INCLUDING WITHOUT CONTRAST IF PERFORMED  2019    CT AORTA AND BILATERAL ILIOFEMORAL RUNOFF ANGIOGRAM W AND/OR WO IV CONTRAST 2019 [facility] [place] [facility]    HYSTERECTOMY  2018    Hysterectomy    IR ANGIOGRAM AORTA ABDOMEN  2019    IR ANGIOGRAM AORTA ABDOMEN 2019 [facility] [place] [facility]    OTHER SURGICAL HISTORY  2018    Proximal Subtotal Pancreatectomy (Whipple Procedure)    OTHER SURGICAL HISTORY  2019    Pacemaker insertion    OTHER SURGICAL HISTORY  2019    Appendectomy laparoscopic   Social History  1 PPD x 60 years h/o smoking.   Social History     Tobacco Use    Smoking status: Former     Current packs/day: 0.00     Types: Cigarettes     Quit date:      Years since quittin.4     Passive exposure: Past    Smokeless tobacco: Never   Vaping Use    Vaping status: Never Used   Substance Use Topics    Alcohol use: Never    Drug use: Never   Family History  Family History   Problem Relation Name Age of Onset    Hyperlipidemia Mother      Hyperlipidemia Father       No current facility-administered medications on file prior to encounter.     Current Outpatient Medications on File Prior to Encounter   Medication Sig Dispense Refill    acetaminophen (Tylenol) 325 mg tablet Take 2 tablets (650 mg) by mouth every 4 hours if needed for mild pain (1 - 3) or moderate pain (4 - 6).      empagliflozin (Jardiance) 10 mg Take 1 tablet (10 mg) by mouth once daily. 90 tablet 3    famotidine (Pepcid) 20 mg tablet Take 1 tablet (20 mg) by mouth once daily.      furosemide (Lasix) 40 mg tablet Take 1 tablet (40 mg) by mouth once daily. Take with Meals 100 tablet 3    losartan (Cozaar) 50 mg tablet Take 1 tablet (50 mg) by mouth once daily. 90 tablet 3    melatonin 10 mg tablet Take 1 tablet (10 mg) by mouth once daily at bedtime.      metFORMIN (Glucophage) 500 mg tablet Take 1 tablet (500 mg) by mouth every 12 hours. With food      metoprolol succinate  XL (Toprol-XL) 25 mg 24 hr tablet Take 1 tablet (25 mg) by mouth once daily. Do not crush or chew. 90 tablet 3    sucralfate (Carafate) 1 gram tablet Take 1 tablet (1 g) by mouth 4 times a day.      nitroglycerin (Nitrostat) 0.4 mg SL tablet Place 1 tablet (0.4 mg) under the tongue every 5 minutes if needed for chest pain (UP TO 3 DOSES AS NEEDED FOR CHEST PAIN.CALL 911 IF PAIN PERSISTS.).     Allergies  Aspirin, Codeine, and Hydrocortisone  Review of Systems   Constitutional:  Negative for appetite change, chills, fatigue, fever and unexpected weight change.   HENT:  Positive for congestion, postnasal drip, rhinorrhea and sneezing. Negative for sinus pressure and sinus pain.    Eyes:  Positive for itching. Negative for pain, redness and visual disturbance.   Respiratory:  Positive for cough, chest tightness and shortness of breath. Negative for wheezing.    Cardiovascular:  Positive for chest pain. Negative for palpitations and leg swelling.   Gastrointestinal:  Positive for diarrhea. Negative for abdominal pain, constipation, nausea and vomiting.   Genitourinary:  Negative for dysuria, frequency and hematuria.   Musculoskeletal:  Positive for back pain. Negative for arthralgias, myalgias and neck pain.   Skin:  Negative for pallor, rash and wound.   Neurological:  Negative for dizziness, seizures, syncope, light-headedness and headaches.   Psychiatric/Behavioral:  Negative for confusion and dysphoric mood. The patient is not nervous/anxious.    Scheduled medications  ferrous sulfate, 65 mg of elemental iron, oral, Daily with breakfast  insulin lispro, 0-5 Units, subcutaneous, TID AC  ipratropium-albuteroL, 3 mL, nebulization, TID  [Held by provider] losartan, 50 mg, oral, Daily  melatonin, 9 mg, oral, Nightly  metoprolol succinate XL, 25 mg, oral, Daily  pantoprazole, 40 mg, oral, Daily before breakfast  piperacillin-tazobactam, 2.25 g, intravenous, q8h  polyethylene glycol, 17 g, oral, Daily  sodium bicarbonate,  650 mg, oral, BID  sucralfate, 1 g, oral, Before meals & nightly    Continuous medications     PRN medications  PRN medications: acetaminophen **OR** acetaminophen **OR** acetaminophen, guaiFENesin, ipratropium-albuteroL, nitroglycerin, oxygen, sodium chloride    Physical Exam  Constitutional:       General: She is not in acute distress.     Appearance: She is normal weight. She is not ill-appearing or toxic-appearing.   HENT:      Head: Normocephalic and atraumatic.      Nose:      Comments: On 1L NC     Mouth/Throat:      Mouth: Mucous membranes are moist.      Comments: Mallampati 2.   Eyes:      General: No scleral icterus.     Extraocular Movements: Extraocular movements intact.      Pupils: Pupils are equal, round, and reactive to light.   Cardiovascular:      Rate and Rhythm: Normal rate and regular rhythm.      Heart sounds: No murmur heard.     No friction rub. No gallop.   Pulmonary:      Effort: Pulmonary effort is normal. No respiratory distress.      Breath sounds: No wheezing or rales.      Comments: Clear lung fields b/l with fair air entry.   Abdominal:      General: Bowel sounds are normal. There is no distension.      Palpations: Abdomen is soft.      Tenderness: There is no abdominal tenderness.   Musculoskeletal:         General: Normal range of motion.      Cervical back: Normal range of motion and neck supple. No rigidity or tenderness.      Right lower leg: No edema.      Left lower leg: No edema.   Lymphadenopathy:      Cervical: No cervical adenopathy.   Skin:     General: Skin is warm and dry.      Coloration: Skin is not jaundiced.   Neurological:      General: No focal deficit present.      Mental Status: She is alert and oriented to person, place, and time.      Cranial Nerves: No cranial nerve deficit.      Motor: No weakness.   Psychiatric:         Mood and Affect: Mood normal.         Behavior: Behavior normal.     Vital Signs  Blood pressure 128/62, pulse 79, temperature 36.7 °C  (98.1 °F), temperature source Temporal, resp. rate 17, height 1.524 m (5'), weight (!) 43.1 kg (95 lb), SpO2 93%.  Oxygen Therapy  SpO2: 93 %  Medical Gas Therapy: Supplemental oxygen  Medical Gas Delivery Method: Nasal cannula     Relevant Results  XR chest 1 view 06/10/2025    Narrative  Interpreted By:  Dillon Catalan,  STUDY:  XR CHEST 1 VIEW;  6/10/2025 10:51 am    INDICATION:  Signs/Symptoms:Cough, shortness of breath.    COMPARISON:  Chest x-rays, most recent from 07/04/2024. CT scan abdomen and pelvis  from 10/16/2022. CT scan chest from 11/11/2019.    ACCESSION NUMBER(S):  [id number]    ORDERING CLINICIAN:  MATEUSZ TEJEDA    TECHNIQUE:  Single AP portable view of the chest was obtained.    FINDINGS:  MEDIASTINUM/ LUNGS/ KAR:  Stable cardiac pacemaker on the left. Biapical pleural and  parenchymal fibrotic changes.  Hyperinflation of the lungs.  Prominence of central pulmonary arteries with peripheral pruning.  Diffuse emphysema. Stable coarsening of lung markings. No  cardiomegaly. No pleural effusion or vascular congestion.  Calcification present in the aorta. No abnormal opacity in either  lung worrisome for tumor or pneumonia. No pneumothorax.  No tracheal deviation.  No abnormal hilar fullness or gross mass on either side.    BONES:  No lytic or blastic destructive bone lesion.    UPPER ABDOMEN:  Grossly intact.    Impression  Stable cardiac pacemaker on the left.    Findings of underlying COPD and emphysema.    No significant or acute interval change.    MACRO:  None    Signed by: Dillon Catalan 6/10/2025 11:33 AM  Dictation workstation:   [id number]    Results for orders placed or performed during the hospital encounter of 06/10/25 (from the past 24 hours)   POCT GLUCOSE   Result Value Ref Range    POCT Glucose 256 (H) 74 - 99 mg/dL   POCT GLUCOSE   Result Value Ref Range    POCT Glucose 247 (H) 74 - 99 mg/dL   CBC   Result Value Ref Range    WBC 9.4 4.4 - 11.3 x10*3/uL    nRBC 0.0 0.0 - 0.0 /100 WBCs     RBC 3.64 (L) 4.00 - 5.20 x10*6/uL    Hemoglobin 8.4 (L) 12.0 - 16.0 g/dL    Hematocrit 26.0 (L) 36.0 - 46.0 %    MCV 71 (L) 80 - 100 fL    MCH 23.1 (L) 26.0 - 34.0 pg    MCHC 32.3 32.0 - 36.0 g/dL    RDW 15.1 (H) 11.5 - 14.5 %    Platelets 178 150 - 450 x10*3/uL   Basic Metabolic Panel   Result Value Ref Range    Glucose 199 (H) 74 - 99 mg/dL    Sodium 139 136 - 145 mmol/L    Potassium 3.6 3.5 - 5.3 mmol/L    Chloride 108 (H) 98 - 107 mmol/L    Bicarbonate 20 (L) 21 - 32 mmol/L    Anion Gap 15 10 - 20 mmol/L    Urea Nitrogen 41 (H) 6 - 23 mg/dL    Creatinine 2.43 (H) 0.50 - 1.05 mg/dL    eGFR 19 (L) >60 mL/min/1.73m*2    Calcium 7.9 (L) 8.6 - 10.3 mg/dL   POCT GLUCOSE   Result Value Ref Range    POCT Glucose 175 (H) 74 - 99 mg/dL   Assessment/Plan   89 YOF with h/o HTN, DLP, CAD s/p PCI, HFrEF, AAA s/p EVAR in 2019, complete heart block s/p PPM, PAD, CKD, T2DM, hiatal hernia, esophagitis, GI bleed due to PUD, pancreatic cancer s/p Whipple procedure, who p/w cough and URI symptoms x 1 week. In the ED found to be hypoxic requiring supplemental O2. Work up also revealed MOOK on CKD, very high BP consistent with hypertensive urgency/emergency, elevated troponin and concern for emphysema on chest imaging. Admitted to [facility] for NSTEMI, MOOK on CK and blood pressure control. Pulmonary is consulted for COPD.     Respiratory failure: acute with hypoxia, likely due to HFrEF and also hypertensive urgency/emergency. O2 need currently minimal.       Continue supplemental O2, wean off as tolerates       Home O2 evaluation before DC       BPH with IS, Acapella,       Management of the individual causes as below.     Emphysema/COPD: states she was told she had emphysema many years ago, not on any inhaler at home. Baseline functional status OK, no wheezing. CT moderate emphysema.        Continue o-[facility]       PFT as outpatient       Pulmonary referral after DC    HFrEF: EF 45%.        Volume status optimization as per primary team  and cardiology       Currently seems euvolemic on my exam    Pulmonary HTN: very mild, likely group 2. RV size and systolic function normal.        CHF management as above.     DVT prophylaxis: currently on SCDs. Would benefit form chemical prophylaxis if no contra-indication.     Thank you for the consult.  Pulmonary will FU while in house.     Mirta Lee MD

## 2025-06-14 NOTE — CARE PLAN
The patient's goals for the shift include  get better    The clinical goals for the shift include Pt will remain safe      Problem: Chronic Conditions and Co-morbidities  Goal: Patient's chronic conditions and co-morbidity symptoms are monitored and maintained or improved  Outcome: Not Progressing

## 2025-06-14 NOTE — PROGRESS NOTES
06/14/25 1224   Discharge Planning   Assistance Needed home     Pt from home, plan is to return when med ready. PT/OT evaluated. Ultrasound of liver, pending, Pt here with esophagitis.

## 2025-06-14 NOTE — PROGRESS NOTES
Maci Catalan is a 89 y.o. female on day 3 of admission presenting with Elevated troponin.      Subjective   Patient seen examined at bedside.  She states that she feels a little bit worse today.  She is more short of breath.  Denies abdominal pain, nausea and vomiting.  Had a bowel movement.        Objective     Last Recorded Vitals  /60 (BP Location: Right arm, Patient Position: Lying)   Pulse 82   Temp 36.7 °C (98.1 °F) (Temporal)   Resp 17   Wt (!) 43.1 kg (95 lb)   SpO2 93%   Intake/Output last 3 Shifts:    Intake/Output Summary (Last 24 hours) at 6/14/2025 1539  Last data filed at 6/14/2025 0550  Gross per 24 hour   Intake 100 ml   Output --   Net 100 ml       Admission Weight  Weight: (!) 43.1 kg (95 lb) (06/10/25 0950)    Daily Weight  06/10/25 : (!) 43.1 kg (95 lb)    Image Results  CT chest wo IV contrast  Narrative: Interpreted By:  Irene Javed,   STUDY:  CT CHEST WO IV CONTRAST;  6/12/2025 1:46 pm      INDICATION:  Signs/Symptoms:cough, sob, hypoxia.          COMPARISON:  11/11/2019      ACCESSION NUMBER(S):  [id number]      ORDERING CLINICIAN:  SUKHJINDER GALVAN      TECHNIQUE:  CT of the chest was performed. Sagittal and coronal reconstructions  were generated. No intravenous contrast given for the examination.      FINDINGS:  Hilar, vessel, and solid organ evaluation limited without IV contrast.      CHEST WALL AND LOWER NECK: Metallic streak artifact from pacer port  in the left anterior chest wall limits assessment of adjacent  structures. No significant axillary lymphadenopathy as visualized.  Multiple punctate calcifications in each breast.      MEDIASTINUM AND KAR:  Limited hilar assessment on unenhanced images.  1.5 cm precarinal node. No obvious hilar adenopathy within limits of  unenhanced exam. Gaseous distention of the proximal esophagus.  Thick-walled distal esophagus.      HEART AND VESSELS:  Lack of IV contrast precludes vascular luminal  assessment. The heart is normal in  size. Pericardial fluid/thickening  measuring 11 mm in thickness posteriorly. Multifocal atherosclerotic  calcifications including the coronary arteries. Pacer wires in the  right side of the heart.      LUNGS, PLEURA, LARGE AIRWAYS:  Moderate emphysema. Central bronchial  wall thickening. Patchy biapical presumed scarring. Small ill-defined  patchy/nodular infiltrates and dependent densities in both lung  bases. No significant pleural effusion. The central airways are  patent.      UPPER ABDOMEN:  Small fat containing medial right diaphragmatic  hernia. 9 mm hypodensity in the left lobe of the liver. Intrahepatic  pneumobilia. Atrophic pancreas. Calcifications/stones measuring up to  11 mm and air in the distal pancreas.  Small calcifications in the  central aspect of each kidney. Rounded hypodensity in the upper pole  of the right kidney. Partially included aortic stent and endograft.      BONES:  Diffuse osteopenia.  Degenerative endplate spurring in the  thoracic spine.      Impression: Moderate emphysema with central bronchial wall thickening. Small  patchy/nodular infiltrates in both lung bases. Follow-up to ensure  resolution after appropriate treatment recommended.      Distal esophageal wall thickening. Correlate with possible  esophagitis.      Enlarged lymph node in the precarinal space. Attention at follow-up  recommended.      Small pericardial effusion.      Incidental findings in the upper abdomen including air in the biliary  and pancreatic ductal systems and probable sizable pancreatic duct  calculi.      Other findings as described above.      MACRO:  None.      Signed by: Irene Javed 6/12/2025 3:09 PM  Dictation workstation:   [id number]  US renal complete  Narrative: Interpreted By:  Irene Javed,   STUDY:  US RENAL COMPLETE;  6/12/2025 1:18 pm      INDICATION:  Signs/Symptoms:MOOK.          COMPARISON:  None.      ACCESSION NUMBER(S):  [id number]      ORDERING CLINICIAN:  ELIF GONZALES       TECHNIQUE:  Multiple images of the kidneys were obtained  .      FINDINGS:  RIGHT KIDNEY:  Measures  9.3 cm in length. No hydronephrosis or shadowing stone  demonstrated. 1.5 cm cyst in the midpole. 1.6 cm cyst in the inferior  pole.      LEFT KIDNEY:  Measures  8.8 cm in length. No hydronephrosis, shadowing stone or  focal mass demonstrated.      BLADDER:  Moderately distended and grossly normal in contour. Uneven  intraluminal echoes. Bilateral ureteral jets demonstrated.      Impression: Normal size kidneys without hydronephrosis.      Right renal cysts.      Echoes/debris in the urinary bladder. Correlation with urinalysis may  be helpful.      MACRO:  None.      Signed by: Irene Javed 6/12/2025 1:31 PM  Dictation workstation:   [id number]  Electrocardiogram, 12-lead PRN ACS symptoms  Atrial-sensed ventricular-paced rhythm  Abnormal ECG  When compared with ECG of 10-KEVIN-2025 09:47, (unconfirmed)  Vent. rate has decreased BY  15 BPM      Physical Exam  Vitals reviewed.   Constitutional:       Appearance: She is underweight. She is ill-appearing.   HENT:      Head: Atraumatic.   Cardiovascular:      Rate and Rhythm: Regular rhythm.      Heart sounds: Normal heart sounds.   Pulmonary:      Effort: Pulmonary effort is normal.      Breath sounds: Normal breath sounds.   Abdominal:      General: Abdomen is flat. Bowel sounds are normal. There is no distension.      Palpations: Abdomen is soft.      Tenderness: There is no abdominal tenderness. There is no guarding or rebound.   Musculoskeletal:      Cervical back: Neck supple.   Skin:     General: Skin is warm and dry.   Neurological:      General: No focal deficit present.      Mental Status: She is alert and oriented to person, place, and time.   Psychiatric:         Mood and Affect: Mood normal.         Behavior: Behavior normal.         Relevant Results                      Scheduled medications  Scheduled Medications[1]  Continuous medications  Continuous  Medications[2]  PRN medications  PRN Medications[3]     Results for orders placed or performed during the hospital encounter of 06/10/25 (from the past 24 hours)   CBC   Result Value Ref Range    WBC 9.4 4.4 - 11.3 x10*3/uL    nRBC 0.0 0.0 - 0.0 /100 WBCs    RBC 3.64 (L) 4.00 - 5.20 x10*6/uL    Hemoglobin 8.4 (L) 12.0 - 16.0 g/dL    Hematocrit 26.0 (L) 36.0 - 46.0 %    MCV 71 (L) 80 - 100 fL    MCH 23.1 (L) 26.0 - 34.0 pg    MCHC 32.3 32.0 - 36.0 g/dL    RDW 15.1 (H) 11.5 - 14.5 %    Platelets 178 150 - 450 x10*3/uL   Basic Metabolic Panel   Result Value Ref Range    Glucose 199 (H) 74 - 99 mg/dL    Sodium 139 136 - 145 mmol/L    Potassium 3.6 3.5 - 5.3 mmol/L    Chloride 108 (H) 98 - 107 mmol/L    Bicarbonate 20 (L) 21 - 32 mmol/L    Anion Gap 15 10 - 20 mmol/L    Urea Nitrogen 41 (H) 6 - 23 mg/dL    Creatinine 2.43 (H) 0.50 - 1.05 mg/dL    eGFR 19 (L) >60 mL/min/1.73m*2    Calcium 7.9 (L) 8.6 - 10.3 mg/dL   POCT GLUCOSE   Result Value Ref Range    POCT Glucose 175 (H) 74 - 99 mg/dL   POCT GLUCOSE   Result Value Ref Range    POCT Glucose 161 (H) 74 - 99 mg/dL    CT chest wo IV contrast  Result Date: 6/12/2025  Interpreted By:  Irene Javed, STUDY: CT CHEST WO IV CONTRAST;  6/12/2025 1:46 pm   INDICATION: Signs/Symptoms:cough, sob, hypoxia.     COMPARISON: 11/11/2019   ACCESSION NUMBER(S): [id number]   ORDERING CLINICIAN: SUKHJINDER GALVAN   TECHNIQUE: CT of the chest was performed. Sagittal and coronal reconstructions were generated. No intravenous contrast given for the examination.   FINDINGS: Hilar, vessel, and solid organ evaluation limited without IV contrast.   CHEST WALL AND LOWER NECK: Metallic streak artifact from pacer port in the left anterior chest wall limits assessment of adjacent structures. No significant axillary lymphadenopathy as visualized. Multiple punctate calcifications in each breast.   MEDIASTINUM AND KAR:  Limited hilar assessment on unenhanced images. 1.5 cm precarinal node. No obvious hilar  adenopathy within limits of unenhanced exam. Gaseous distention of the proximal esophagus. Thick-walled distal esophagus.   HEART AND VESSELS:  Lack of IV contrast precludes vascular luminal assessment. The heart is normal in size. Pericardial fluid/thickening measuring 11 mm in thickness posteriorly. Multifocal atherosclerotic calcifications including the coronary arteries. Pacer wires in the right side of the heart.   LUNGS, PLEURA, LARGE AIRWAYS:  Moderate emphysema. Central bronchial wall thickening. Patchy biapical presumed scarring. Small ill-defined patchy/nodular infiltrates and dependent densities in both lung bases. No significant pleural effusion. The central airways are patent.   UPPER ABDOMEN:  Small fat containing medial right diaphragmatic hernia. 9 mm hypodensity in the left lobe of the liver. Intrahepatic pneumobilia. Atrophic pancreas. Calcifications/stones measuring up to 11 mm and air in the distal pancreas.  Small calcifications in the central aspect of each kidney. Rounded hypodensity in the upper pole of the right kidney. Partially included aortic stent and endograft.   BONES:  Diffuse osteopenia.  Degenerative endplate spurring in the thoracic spine.       Moderate emphysema with central bronchial wall thickening. Small patchy/nodular infiltrates in both lung bases. Follow-up to ensure resolution after appropriate treatment recommended.   Distal esophageal wall thickening. Correlate with possible esophagitis.   Enlarged lymph node in the precarinal space. Attention at follow-up recommended.   Small pericardial effusion.   Incidental findings in the upper abdomen including air in the biliary and pancreatic ductal systems and probable sizable pancreatic duct calculi.   Other findings as described above.   MACRO: None.   Signed by: Irene Javed 6/12/2025 3:09 PM Dictation workstation:   [id number]    US renal complete  Result Date: 6/12/2025  Interpreted By:  Irene Javed, STUDY: US RENAL  COMPLETE;  6/12/2025 1:18 pm   INDICATION: Signs/Symptoms:MOOK.     COMPARISON: None.   ACCESSION NUMBER(S): [id number]   ORDERING CLINICIAN: ELIF GONZALES   TECHNIQUE: Multiple images of the kidneys were obtained  .   FINDINGS: RIGHT KIDNEY: Measures  9.3 cm in length. No hydronephrosis or shadowing stone demonstrated. 1.5 cm cyst in the midpole. 1.6 cm cyst in the inferior pole.   LEFT KIDNEY: Measures  8.8 cm in length. No hydronephrosis, shadowing stone or focal mass demonstrated.   BLADDER: Moderately distended and grossly normal in contour. Uneven intraluminal echoes. Bilateral ureteral jets demonstrated.       Normal size kidneys without hydronephrosis.   Right renal cysts.   Echoes/debris in the urinary bladder. Correlation with urinalysis may be helpful.   MACRO: None.   Signed by: Irene Javed 6/12/2025 1:31 PM Dictation workstation:   [id number]    Electrocardiogram, 12-lead PRN ACS symptoms  Result Date: 6/12/2025  Atrial-sensed ventricular-paced rhythm Abnormal ECG When compared with ECG of 10-KEVIN-2025 09:47, (unconfirmed) Vent. rate has decreased BY  15 BPM    Transthoracic Echo Complete  Result Date: 6/11/2025   [facility], [address] [place] [address]              Tel [phone] and Fax [phone] TRANSTHORACIC ECHOCARDIOGRAM REPORT  Patient Name:       VIKY Morrissey Physician:   [id number] Ridley DO Study Date:         6/11/2025           Ordering Provider:   [id number] RIDLEY MRN/PID:            [id number]            Fellow: Accession#:         [id number]        Nurse: Date of Birth/Age:  1935 / 89      Sonographer:         Nash Ramirez RDCS                     years Gender assigned at  F                   Additional Staff: Birth: Height:             153.00 cm           Admit Date: Weight:             43.10 kg             Admission Status:    Observation -                                                              Priority discharge BSA / BMI:          1.36 m2 / 18.41     Encounter#:          [id number]                     kg/m2 Blood Pressure:     112/58 mmHg         Department Location: [facility] Non                                                              Invasive Study Type:    TRANSTHORACIC ECHO (TTE) COMPLETE Diagnosis/ICD: Elevated Troponin-R79.89 Indication:    elevated troponin CPT Code:      Echo Limited-61457 Patient History: Troponin Level 1:  Enzymes positive Pertinent History: CAD and HTN. Study Detail: The following Echo studies were performed: 2D, M-Mode, Doppler and               color flow.  PHYSICIAN INTERPRETATION: Left Ventricle: Left ventricular ejection fraction is mildly decreased by visual estimate at 45-50%. There is mild concentric left ventricular hypertrophy. There is global hypokinesis of the left ventricle with minor regional variations. The left ventricular cavity size is normal. There is moderately increased septal and normal posterior left ventricular wall thickness. Spectral Doppler shows an abnormal pattern of left ventricular diastolic filling due to right ventricular pacing. Left Atrium: The left atrial size is normal. Right Ventricle: The right ventricle is normal in size. There is normal right ventricular global systolic function. A device is visualized in the right ventricle. TAPSE = 17 mm. Right Atrium: The right atrial size was not assessed. There is a device visualized in the right atrium. Aortic Valve: The aortic valve is trileaflet. There is no evidence of aortic valve regurgitation. Mitral Valve: The mitral valve is mildly thickened. There is trace to mild mitral valve regurgitation. The E Vmax is 0.63 m/s. Tricuspid Valve: The tricuspid valve is structurally normal. There is mild tricuspid regurgitation. The Doppler estimated right ventricular systolic pressure (RVSP) is mildly  elevated at 33 mmHg. Pulmonic Valve: The pulmonic valve is structurally normal. Pulmonic valve regurgitation was not assessed. Pericardium: Small pericardial effusion localized near the right ventricle. There is no evidence of cardiac tamponade. Aorta: The aortic root was not assessed. Systemic Veins: The inferior vena cava appears normal in size, with IVC inspiratory collapse greater than 50%. In comparison to the previous echocardiogram(s): Compared with study dated 2/21/2025,. LVEF has improved. There is now a small RV localized pericardial effusion without tamponade physiology present.  CONCLUSIONS:  1. Left ventricular ejection fraction is mildly decreased by visual estimate at 45-50%.  2. There is global hypokinesis of the left ventricle with minor regional variations.  3. Spectral Doppler shows an abnormal pattern of left ventricular diastolic filling due to right ventricular pacing.  4. There is moderately increased septal thickness.  5. There is normal right ventricular global systolic function.  6. Small pericardial effusion.  7. There is no evidence of cardiac tamponade.  8. The Doppler estimated RVSP is mildly elevated at 33 mmHg. QUANTITATIVE DATA SUMMARY:  2D MEASUREMENTS:          Normal Ranges: IVSd:            1.29 cm  (0.6-1.1cm) LVPWd:           0.91 cm  (0.6-1.1cm) LVIDd:           3.77 cm  (3.9-5.9cm) LVIDs:           3.07 cm LV Mass Index:   98 g/m2 LVEDV Index:     44 ml/m2 LV % FS          18.5 %  LEFT ATRIUM:                  Normal Ranges: LA Vol A4C:        23.7 ml    (22+/-6mL/m2) LA Vol A2C:        36.5 ml LA Vol BP:         32.4 ml LA Vol Index A4C:  17.4ml/m2 LA Vol Index A2C:  26.7 ml/m2 LA Vol Index BP:   23.7 ml/m2 LA Area A4C:       9.6 cm2 LA Area A2C:       13.1 cm2 LA Major Axis A4C: 3.3 cm LA Major Axis A2C: 4.0 cm LA Volume Index:   23.7 ml/m2 LA Vol A4C:        19.1 ml LA Vol A2C:        32.4 ml LA Vol Index BSA:  18.9 ml/m2  LV SYSTOLIC FUNCTION:                      Normal  Ranges: EF-A4C View:    30 % (>=55%) EF-A2C View:    54 % EF-Biplane:     46 % EF-Visual:      48 % LV EF Reported: 48 %  LV DIASTOLIC FUNCTION:          Normal Ranges: MV Peak E:             0.63 m/s (0.7-1.2 m/s) MV Peak A:             1.32 m/s (0.42-0.7 m/s) E/A Ratio:             0.48     (1.0-2.2)  MITRAL VALVE:          Normal Ranges: MV DT:        171 msec (150-240msec)  AORTIC VALVE:          Normal Ranges: AoV Vmax:     1.06 m/s (<=1.7m/s) AoV Peak P.5 mmHg (<20mmHg)  RIGHT VENTRICLE: TAPSE: 17.0 mm  TRICUSPID VALVE/RVSP:          Normal Ranges: Peak TR Velocity:     2.73 m/s Est. RA Pressure:     3 RV Syst Pressure:     33       (< 30mmHg) IVC Diam:             1.68 cm  [id number] Ruben Sanchez DO Electronically signed on 2025 at 3:24:21 PM  ** Final **     ECG 12 lead  Result Date: 2025  Atrial-sensed ventricular-paced rhythm Abnormal ECG When compared with ECG of 2025 10:21, Vent. rate has increased BY  40 BPM    XR chest 1 view  Result Date: 6/10/2025  Interpreted By:  Dillon Catalan, STUDY: XR CHEST 1 VIEW;  6/10/2025 10:51 am   INDICATION: Signs/Symptoms:Cough, shortness of breath.   COMPARISON: Chest x-rays, most recent from 2024. CT scan abdomen and pelvis from 10/16/2022. CT scan chest from 2019.   ACCESSION NUMBER(S): [id number]   ORDERING CLINICIAN: MATEUSZ TEJEDA   TECHNIQUE: Single AP portable view of the chest was obtained.   FINDINGS: MEDIASTINUM/ LUNGS/ KAR: Stable cardiac pacemaker on the left. Biapical pleural and parenchymal fibrotic changes.  Hyperinflation of the lungs. Prominence of central pulmonary arteries with peripheral pruning. Diffuse emphysema. Stable coarsening of lung markings. No cardiomegaly. No pleural effusion or vascular congestion. Calcification present in the aorta. No abnormal opacity in either lung worrisome for tumor or pneumonia. No pneumothorax. No tracheal deviation. No abnormal hilar fullness or gross mass on either side.   BONES: No  lytic or blastic destructive bone lesion.   UPPER ABDOMEN: Grossly intact.       Stable cardiac pacemaker on the left.   Findings of underlying COPD and emphysema.   No significant or acute interval change.   MACRO: None   Signed by: Dillon Catalan 6/10/2025 11:33 AM Dictation workstation:   [id number]       Assessment & Plan  Elevated troponin    Benign essential HTN    3-vessel coronary artery disease    Chronic systolic congestive heart failure    Diabetes mellitus type 2, noninsulin dependent (Multi)    Acute bronchitis, unspecified organism    Hyperglycemia    Maci Catalan is a 89 y.o. female with a PMHx s/f CAD s/p PCI in 2019, CHF, AAA s/p EVAR in 11/2019, Complete Heart Block  s/p PPM, HTN, HLD, CHF, PAD, CKD, DMT2, s/p Whipple procedure for IPMN in 2018, GI bleed, large hiatal hernia, esophagitis, esophageal/gastric/jejunal ulcers who presented to [facility] ER on 6/10/25 for cough and viral symptoms.  GI service was consulted for abnormal CT scan of the GI tract.     1.Esophagitis - patient denies heartburn, dysphagia, odynophagia.  Tolerates diet well.  Last EGD in 2023.  Continue home medication.  2.9 mm hypodensity in the left lobe of the liver - liver ultrasound pending and normal AFP. OT follow up with PCP.  3.Intrahepatic pneumobilia; atrophic pancreas; calcifications/stones measuring up to 11 mm and air in the distal pancreas - patient denies diarrhea with stable weight.  Fecal pancreatic elastase pending.  No other interventions as per advanced GI Dr. João Sewell.     GI will sign off. Please reach out if any questions or further assistance needed.     Case discussed with Dr. Susan Hdz, APRN-CNP           [1] ferrous sulfate, 65 mg of elemental iron, oral, Daily with breakfast  insulin lispro, 0-5 Units, subcutaneous, TID AC  ipratropium-albuteroL, 3 mL, nebulization, TID  [Held by provider] losartan, 50 mg, oral, Daily  melatonin, 9 mg, oral, Nightly  methylPREDNISolone sodium  succinate (PF), 40 mg, intravenous, q24h  metoprolol succinate XL, 25 mg, oral, Daily  pantoprazole, 40 mg, oral, Daily before breakfast  piperacillin-tazobactam, 2.25 g, intravenous, q8h  polyethylene glycol, 17 g, oral, Daily  sodium bicarbonate, 650 mg, oral, BID  sucralfate, 1 g, oral, Before meals & nightly  [2]    [3] PRN medications: acetaminophen **OR** acetaminophen **OR** acetaminophen, guaiFENesin, ipratropium-albuteroL, nitroglycerin, oxygen, sodium chloride

## 2025-06-14 NOTE — CARE PLAN
The patient's goals for the shift include      The clinical goals for the shift include Pt will remain safe      Problem: Safety - Adult  Goal: Free from fall injury  Outcome: Progressing

## 2025-06-14 NOTE — PROGRESS NOTES
Maci Catalan is a 89 y.o. female     Patient feels a little better  Still on oxygen  Did work with therapy yesterday    Review of Systems     Constitutional: no fever, no chills, not feeling poorly, not feeling tired   Cardiovascular: no chest pain   Respiratory: Wheezing  Gastrointestinal: no abdominal pain, no constipation, no melena, no nausea, no diarrhea, no vomiting and no blood in stools.   Neurological: no headache,   All other systems have been reviewed and are negative for complaint.       Vitals:    06/14/25 1248   BP:    Pulse:    Resp:    Temp:    SpO2: 93%        Scheduled medications  Scheduled Medications[1]  Continuous medications  Continuous Medications[2]  PRN medications  PRN Medications[3]    Lab Review   Results from last 7 days   Lab Units 06/14/25  0505 06/13/25  0506 06/12/25  0450   WBC AUTO x10*3/uL 9.4 9.8 11.5*   HEMOGLOBIN g/dL 8.4* 8.3* 7.9*   HEMATOCRIT % 26.0* 28.0* 25.3*   PLATELETS AUTO x10*3/uL 178 185 196     Results from last 7 days   Lab Units 06/14/25  0505 06/13/25  0506 06/12/25  0450 06/11/25  0437 06/10/25  1009   SODIUM mmol/L 139 137 132*   < > 139   POTASSIUM mmol/L 3.6 3.7 4.2   < > 4.0   CHLORIDE mmol/L 108* 106 102   < > 106   CO2 mmol/L 20* 21 18*   < > 20*   BUN mg/dL 41* 45* 44*   < > 34*   CREATININE mg/dL 2.43* 2.51* 2.55*   < > 2.62*   CALCIUM mg/dL 7.9* 8.1* 8.1*   < > 7.5*   PROTEIN TOTAL g/dL  --  5.9*  5.9*  --   --  6.4   BILIRUBIN TOTAL mg/dL  --  0.3  --   --  0.3   ALK PHOS U/L  --  111  --   --  157*   ALT U/L  --  12  --   --  10   AST U/L  --  13  --   --  13   GLUCOSE mg/dL 199* 141* 425*   < > 192*    < > = values in this interval not displayed.     Results from last 7 days   Lab Units 06/11/25  1458 06/11/25  1010 06/11/25  0437   TROPHS ng/L 166* 196* 245*        CT chest wo IV contrast   Final Result   Moderate emphysema with central bronchial wall thickening. Small   patchy/nodular infiltrates in both lung bases. Follow-up to ensure    resolution after appropriate treatment recommended.        Distal esophageal wall thickening. Correlate with possible   esophagitis.        Enlarged lymph node in the precarinal space. Attention at follow-up   recommended.        Small pericardial effusion.        Incidental findings in the upper abdomen including air in the biliary   and pancreatic ductal systems and probable sizable pancreatic duct   calculi.        Other findings as described above.        MACRO:   None.        Signed by: Irene Javed 6/12/2025 3:09 PM   Dictation workstation:   [id number]      US renal complete   Final Result   Normal size kidneys without hydronephrosis.        Right renal cysts.        Echoes/debris in the urinary bladder. Correlation with urinalysis may   be helpful.        MACRO:   None.        Signed by: Irene Javed 6/12/2025 1:31 PM   Dictation workstation:   [id number]      Transthoracic Echo Complete   Final Result      XR chest 1 view   Final Result   Stable cardiac pacemaker on the left.        Findings of underlying COPD and emphysema.        No significant or acute interval change.        MACRO:   None        Signed by: Dillon Catalan 6/10/2025 11:33 AM   Dictation workstation:   [id number]      US abdomen limited liver    (Results Pending)         Physical Exam    Constitutional   General appearance: Alert and in no acute distress.   Pulmonary   Respiratory assessment: On nasal cannula  Auscultation of Lungs: Wheezing  Cardiovascular   Auscultation of heart: Apical pulse normal, heart rate and rhythm normal, normal S1 and S2, no murmurs and no pericardial rub.    Exam for edema: No peripheral edema.   Abdomen   Abdominal Exam: No bruits, normal bowel sounds, soft, non-tender, no abdominal mass palpated.    Liver and Spleen exam: No hepato-splenomegaly.   Musculoskeletal   Examination of gait: Normal.    Inspection of digits and nails: No clubbing or cyanosis of the fingernails.    Inspection/palpation of  joints, bones and muscles: No joint swelling. Normal movement of all extremities.   Neurologic   Cranial nerves: Nerves 2-12 were intact, no focal neuro defects.         Assessment/Plan      #Exacerbation of COPD  #Acute kidney injury  #Hypertension  #Esophagitis    Continue PPI  DuoNebs  Steroids and antibiotics  Wean off oxygen  Kidney functions are improving       [1] ferrous sulfate, 65 mg of elemental iron, oral, Daily with breakfast  insulin lispro, 0-5 Units, subcutaneous, TID AC  ipratropium-albuteroL, 3 mL, nebulization, TID  [Held by provider] losartan, 50 mg, oral, Daily  melatonin, 9 mg, oral, Nightly  metoprolol succinate XL, 25 mg, oral, Daily  pantoprazole, 40 mg, oral, Daily before breakfast  piperacillin-tazobactam, 2.25 g, intravenous, q8h  polyethylene glycol, 17 g, oral, Daily  sodium bicarbonate, 650 mg, oral, BID  sucralfate, 1 g, oral, Before meals & nightly    [2]    [3] PRN medications: acetaminophen **OR** acetaminophen **OR** acetaminophen, guaiFENesin, ipratropium-albuteroL, nitroglycerin, oxygen, sodium chloride

## 2025-06-15 VITALS
DIASTOLIC BLOOD PRESSURE: 65 MMHG | HEIGHT: 60 IN | TEMPERATURE: 98.2 F | OXYGEN SATURATION: 95 % | SYSTOLIC BLOOD PRESSURE: 117 MMHG | BODY MASS INDEX: 18.65 KG/M2 | HEART RATE: 90 BPM | WEIGHT: 95 LBS | RESPIRATION RATE: 16 BRPM

## 2025-06-15 LAB
ANION GAP SERPL CALC-SCNC: 12 MMOL/L (ref 10–20)
BUN SERPL-MCNC: 41 MG/DL (ref 6–23)
CALCIUM SERPL-MCNC: 7.7 MG/DL (ref 8.6–10.3)
CHLORIDE SERPL-SCNC: 108 MMOL/L (ref 98–107)
CO2 SERPL-SCNC: 23 MMOL/L (ref 21–32)
CREAT SERPL-MCNC: 2.35 MG/DL (ref 0.5–1.05)
EGFRCR SERPLBLD CKD-EPI 2021: 19 ML/MIN/1.73M*2
ERYTHROCYTE [DISTWIDTH] IN BLOOD BY AUTOMATED COUNT: 15.4 % (ref 11.5–14.5)
GLUCOSE BLD MANUAL STRIP-MCNC: 143 MG/DL (ref 74–99)
GLUCOSE BLD MANUAL STRIP-MCNC: 221 MG/DL (ref 74–99)
GLUCOSE BLD MANUAL STRIP-MCNC: 270 MG/DL (ref 74–99)
GLUCOSE BLD MANUAL STRIP-MCNC: 352 MG/DL (ref 74–99)
GLUCOSE BLD MANUAL STRIP-MCNC: 355 MG/DL (ref 74–99)
GLUCOSE SERPL-MCNC: 120 MG/DL (ref 74–99)
HCT VFR BLD AUTO: 25.4 % (ref 36–46)
HGB BLD-MCNC: 7.9 G/DL (ref 12–16)
KAPPA LC SERPL-MCNC: 6.37 MG/DL (ref 0.33–1.94)
KAPPA LC/LAMBDA SER: 1.41 {RATIO} (ref 0.26–1.65)
LAMBDA LC SERPL-MCNC: 4.53 MG/DL (ref 0.57–2.63)
MCH RBC QN AUTO: 23 PG (ref 26–34)
MCHC RBC AUTO-ENTMCNC: 31.1 G/DL (ref 32–36)
MCV RBC AUTO: 74 FL (ref 80–100)
NRBC BLD-RTO: 0 /100 WBCS (ref 0–0)
PLATELET # BLD AUTO: 201 X10*3/UL (ref 150–450)
POTASSIUM SERPL-SCNC: 3.8 MMOL/L (ref 3.5–5.3)
RBC # BLD AUTO: 3.44 X10*6/UL (ref 4–5.2)
SODIUM SERPL-SCNC: 139 MMOL/L (ref 136–145)
WBC # BLD AUTO: 9.8 X10*3/UL (ref 4.4–11.3)

## 2025-06-15 PROCEDURE — 2500000002 HC RX 250 W HCPCS SELF ADMINISTERED DRUGS (ALT 637 FOR MEDICARE OP, ALT 636 FOR OP/ED): Performed by: NURSE PRACTITIONER

## 2025-06-15 PROCEDURE — 2500000001 HC RX 250 WO HCPCS SELF ADMINISTERED DRUGS (ALT 637 FOR MEDICARE OP): Performed by: INTERNAL MEDICINE

## 2025-06-15 PROCEDURE — 6350000001 HC RX 635 EPOETIN >10,000 UNITS: Performed by: INTERNAL MEDICINE

## 2025-06-15 PROCEDURE — 2500000004 HC RX 250 GENERAL PHARMACY W/ HCPCS (ALT 636 FOR OP/ED): Performed by: INTERNAL MEDICINE

## 2025-06-15 PROCEDURE — 99232 SBSQ HOSP IP/OBS MODERATE 35: CPT | Performed by: INTERNAL MEDICINE

## 2025-06-15 PROCEDURE — 2500000004 HC RX 250 GENERAL PHARMACY W/ HCPCS (ALT 636 FOR OP/ED): Performed by: NURSE PRACTITIONER

## 2025-06-15 PROCEDURE — 80048 BASIC METABOLIC PNL TOTAL CA: CPT | Performed by: NURSE PRACTITIONER

## 2025-06-15 PROCEDURE — 2500000005 HC RX 250 GENERAL PHARMACY W/O HCPCS: Performed by: FAMILY MEDICINE

## 2025-06-15 PROCEDURE — 36415 COLL VENOUS BLD VENIPUNCTURE: CPT | Performed by: NURSE PRACTITIONER

## 2025-06-15 PROCEDURE — 82947 ASSAY GLUCOSE BLOOD QUANT: CPT

## 2025-06-15 PROCEDURE — 85027 COMPLETE CBC AUTOMATED: CPT | Performed by: FAMILY MEDICINE

## 2025-06-15 PROCEDURE — 2500000005 HC RX 250 GENERAL PHARMACY W/O HCPCS: Performed by: NURSE PRACTITIONER

## 2025-06-15 PROCEDURE — 1200000002 HC GENERAL ROOM WITH TELEMETRY DAILY

## 2025-06-15 PROCEDURE — 2500000001 HC RX 250 WO HCPCS SELF ADMINISTERED DRUGS (ALT 637 FOR MEDICARE OP): Performed by: NURSE PRACTITIONER

## 2025-06-15 PROCEDURE — 94640 AIRWAY INHALATION TREATMENT: CPT

## 2025-06-15 RX ORDER — HEPARIN SODIUM 5000 [USP'U]/ML
5000 INJECTION, SOLUTION INTRAVENOUS; SUBCUTANEOUS EVERY 8 HOURS SCHEDULED
Status: DISCONTINUED | OUTPATIENT
Start: 2025-06-15 | End: 2025-06-17 | Stop reason: HOSPADM

## 2025-06-15 RX ORDER — INSULIN LISPRO 100 [IU]/ML
0-5 INJECTION, SOLUTION INTRAVENOUS; SUBCUTANEOUS
Status: DISCONTINUED | OUTPATIENT
Start: 2025-06-15 | End: 2025-06-17 | Stop reason: HOSPADM

## 2025-06-15 RX ORDER — CHOLECALCIFEROL (VITAMIN D3) 25 MCG
50 TABLET ORAL DAILY
Status: DISCONTINUED | OUTPATIENT
Start: 2025-06-15 | End: 2025-06-17 | Stop reason: HOSPADM

## 2025-06-15 RX ORDER — INSULIN LISPRO 100 [IU]/ML
0-5 INJECTION, SOLUTION INTRAVENOUS; SUBCUTANEOUS
Status: DISCONTINUED | OUTPATIENT
Start: 2025-06-15 | End: 2025-06-15

## 2025-06-15 RX ADMIN — SODIUM BICARBONATE 650 MG: 650 TABLET ORAL at 08:44

## 2025-06-15 RX ADMIN — PIPERACILLIN SODIUM AND TAZOBACTAM SODIUM 2.25 G: 2; .25 INJECTION, SOLUTION INTRAVENOUS at 05:50

## 2025-06-15 RX ADMIN — IPRATROPIUM BROMIDE AND ALBUTEROL SULFATE 3 ML: 2.5; .5 SOLUTION RESPIRATORY (INHALATION) at 12:07

## 2025-06-15 RX ADMIN — IPRATROPIUM BROMIDE AND ALBUTEROL SULFATE 3 ML: 2.5; .5 SOLUTION RESPIRATORY (INHALATION) at 07:22

## 2025-06-15 RX ADMIN — HEPARIN SODIUM 5000 UNITS: 5000 INJECTION, SOLUTION INTRAVENOUS; SUBCUTANEOUS at 12:35

## 2025-06-15 RX ADMIN — METHYLPREDNISOLONE SODIUM SUCCINATE 40 MG: 125 INJECTION, POWDER, FOR SOLUTION INTRAMUSCULAR; INTRAVENOUS at 15:30

## 2025-06-15 RX ADMIN — SUCRALFATE 1 G: 1 TABLET ORAL at 05:50

## 2025-06-15 RX ADMIN — IPRATROPIUM BROMIDE AND ALBUTEROL SULFATE 3 ML: 2.5; .5 SOLUTION RESPIRATORY (INHALATION) at 21:30

## 2025-06-15 RX ADMIN — METOPROLOL SUCCINATE 25 MG: 25 TABLET, EXTENDED RELEASE ORAL at 08:44

## 2025-06-15 RX ADMIN — PANTOPRAZOLE SODIUM 40 MG: 40 TABLET, DELAYED RELEASE ORAL at 05:50

## 2025-06-15 RX ADMIN — HEPARIN SODIUM 5000 UNITS: 5000 INJECTION, SOLUTION INTRAVENOUS; SUBCUTANEOUS at 20:35

## 2025-06-15 RX ADMIN — SUCRALFATE 1 G: 1 TABLET ORAL at 12:34

## 2025-06-15 RX ADMIN — PIPERACILLIN SODIUM AND TAZOBACTAM SODIUM 2.25 G: 2; .25 INJECTION, SOLUTION INTRAVENOUS at 15:30

## 2025-06-15 RX ADMIN — PIPERACILLIN SODIUM AND TAZOBACTAM SODIUM 2.25 G: 2; .25 INJECTION, SOLUTION INTRAVENOUS at 20:36

## 2025-06-15 RX ADMIN — EPOETIN ALFA-EPBX 6000 UNITS: 10000 INJECTION, SOLUTION INTRAVENOUS; SUBCUTANEOUS at 20:34

## 2025-06-15 RX ADMIN — INSULIN LISPRO 3 UNITS: 100 INJECTION, SOLUTION INTRAVENOUS; SUBCUTANEOUS at 12:34

## 2025-06-15 RX ADMIN — Medication 1 L/MIN: at 21:30

## 2025-06-15 RX ADMIN — Medication 50 MCG: at 12:34

## 2025-06-15 RX ADMIN — Medication 1 L/MIN: at 07:22

## 2025-06-15 RX ADMIN — FERROUS SULFATE TAB 325 MG (65 MG ELEMENTAL FE) 1 TABLET: 325 (65 FE) TAB at 08:45

## 2025-06-15 RX ADMIN — Medication 9 MG: at 20:35

## 2025-06-15 RX ADMIN — SODIUM BICARBONATE 650 MG: 650 TABLET ORAL at 20:36

## 2025-06-15 RX ADMIN — SUCRALFATE 1 G: 1 TABLET ORAL at 20:36

## 2025-06-15 RX ADMIN — INSULIN LISPRO 5 UNITS: 100 INJECTION, SOLUTION INTRAVENOUS; SUBCUTANEOUS at 20:33

## 2025-06-15 RX ADMIN — Medication 1 L/MIN: at 12:07

## 2025-06-15 RX ADMIN — INSULIN LISPRO 2 UNITS: 100 INJECTION, SOLUTION INTRAVENOUS; SUBCUTANEOUS at 17:39

## 2025-06-15 RX ADMIN — INSULIN LISPRO 5 UNITS: 100 INJECTION, SOLUTION INTRAVENOUS; SUBCUTANEOUS at 01:25

## 2025-06-15 RX ADMIN — SUCRALFATE 1 G: 1 TABLET ORAL at 15:30

## 2025-06-15 ASSESSMENT — PAIN SCALES - GENERAL: PAINLEVEL_OUTOF10: 0 - NO PAIN

## 2025-06-15 NOTE — CARE PLAN
The patient's goals for the shift include improved breathing    The clinical goals for the shift include Maintain pt safety    Over the shift, the patient did make progress toward the following goals.     Problem: Pain - Adult  Goal: Verbalizes/displays adequate comfort level or baseline comfort level  Outcome: Progressing  Flowsheets (Taken 6/15/2025 1037)  Verbalizes/displays adequate comfort level or baseline comfort level:   Encourage patient to monitor pain and request assistance   Assess pain using appropriate pain scale   Administer analgesics based on type and severity of pain and evaluate response   Implement non-pharmacological measures as appropriate and evaluate response   Consider cultural and social influences on pain and pain management     Problem: Safety - Adult  Goal: Free from fall injury  Outcome: Progressing  Flowsheets (Taken 6/15/2025 1037)  Free from fall injury:   Instruct family/caregiver on patient safety   Based on caregiver fall risk screen, instruct family/caregiver to ask for assistance with transferring infant if caregiver noted to have fall risk factors     Problem: Discharge Planning  Goal: Discharge to home or other facility with appropriate resources  Outcome: Progressing  Flowsheets (Taken 6/15/2025 1037)  Discharge to home or other facility with appropriate resources:   Identify barriers to discharge with patient and caregiver   Arrange for needed discharge resources and transportation as appropriate   Identify discharge learning needs (meds, wound care, etc)   Arrange for interpreters to assist at discharge as needed     Problem: Chronic Conditions and Co-morbidities  Goal: Patient's chronic conditions and co-morbidity symptoms are monitored and maintained or improved  Outcome: Progressing  Flowsheets (Taken 6/15/2025 1037)  Care Plan - Patient's Chronic Conditions and Co-Morbidity Symptoms are Monitored and Maintained or Improved:   Monitor and assess patient's chronic  conditions and comorbid symptoms for stability, deterioration, or improvement   Update acute care plan with appropriate goals if chronic or comorbid symptoms are exacerbated and prevent overall improvement and discharge   Collaborate with multidisciplinary team to address chronic and comorbid conditions and prevent exacerbation or deterioration     Problem: Nutrition  Goal: Nutrient intake appropriate for maintaining nutritional needs  Outcome: Progressing     Problem: Heart Failure  Goal: Improved gas exchange this shift  Outcome: Progressing  Flowsheets (Taken 6/15/2025 1037)  Improved gas exchange this shift:   Assist with pulmonary hygiene and secretion clearance   Position to promote circulation/maximize ventilation   Prepare for use of devices/procedures to correct dysrhythmia  Goal: Improved urinary output this shift  Outcome: Progressing  Flowsheets (Taken 6/15/2025 1037)  Improved urinary output this shift:   Med administration/monitor effect   Monitor intake/output and daily weight   Monitor serum electrolytes/replace per order  Goal: Reduction in peripheral edema within 24 hours  Outcome: Progressing  Flowsheets (Taken 6/15/2025 1037)  Reduction in peripheral edema within 24 hours:   Consult dietician   Frequent small meals/supplements per order   Monitor edema/skin/mucous membrane integrity   SCDs/elevate extremities  Goal: Report improvement of dyspnea/breathlessness this shift  Outcome: Progressing  Flowsheets (Taken 6/15/2025 1037)  Report improvement of dyspnea/breathlessness this shift:   Ambulate/OOB 3 times daily   Consider PT/OT consult   Encourage activity/mobility/ROM   Facilitate activity/mobility per patient tolerance/advance   Incentive spirometry/deep breathing /HOB elevated elevated  Goal: Weight from fluid excess reduced over 2-3 days, then stabilize  Outcome: Progressing  Flowsheets (Taken 6/15/2025 1037)  Weight from fluid excess reduced over 2-3 days, then stabilize:   Med  administration/monitor effect   Monitor bowel elimination   Monitor intake/output and daily weight   Monitor serum electrolytes/replace per order  Goal: Increase self care and/or family involvement in 24 hours  Outcome: Progressing  Flowsheets (Taken 6/15/2025 1037)  Increase self care and/or family involvement in 24 hours:   Assess for signs/symptoms of depression   Facilitate advanced care planning/discussion of treatment options   Organize tasks/allow time for rest   Recognize current coping skills and assist to develop new coping skills   Therapy evaluation and treatment     Problem: Diabetes  Goal: Achieve decreasing blood glucose levels by end of shift  Outcome: Progressing  Flowsheets (Taken 6/15/2025 1037)  Achieve decreasing blood glucose levels by end of shift:   Med administration/monitoring of effect   Self monitor blood glucose with staff oversight  Goal: Increase stability of blood glucose readings by end of shift  Outcome: Progressing  Flowsheets (Taken 6/15/2025 1037)  Increase stability of blood glucose readings by end of shift: Med administration/monitoring of effect  Goal: Decrease in ketones present in urine by end of shift  Outcome: Progressing  Flowsheets (Taken 6/15/2025 1037)  Decrease in ketones present in urine by end of shift:   Med administration/monitoring of effect   Monitor urine output  Goal: Maintain electrolyte levels within acceptable range throughout shift  Outcome: Progressing  Flowsheets (Taken 6/15/2025 1037)  Maintain electrolyte levels within acceptable range throughout shift:   Med administration/monitoring of effect   Monitor urine output  Goal: Maintain glucose levels >70mg/dl to <250mg/dl throughout shift  Outcome: Progressing  Flowsheets (Taken 6/15/2025 1037)  Maintain glucose levels >70mg/dl to <250mg/dl throughout shift:   Med administration/monitoring of effect   Self monitor blood glucose with staff oversight  Goal: No changes in neurological exam by end of  shift  Outcome: Progressing  Flowsheets (Taken 6/15/2025 1037)  No changes in neurological exam by end of shift: Complete frequent neurological assessments  Goal: Learn about and adhere to nutrition recommendations by end of shift  Outcome: Progressing  Flowsheets (Taken 6/15/2025 1037)  Learn about and adhere to nutrition recommendations by end of shift: Ensure/encourage compliance with appropriate diet  Goal: Vital signs within normal range for age by end of shift  Outcome: Progressing  Flowsheets (Taken 6/15/2025 1037)  Vital signs within normal range for age by end of shift: Med administration/monitoring of effect  Goal: Increase self care and/or family involovement by end of shift  Outcome: Progressing  Flowsheets (Taken 6/15/2025 1037)  Increase self care and/or family involovement by end of shift: Self monitor blood glucose with staff oversight  Goal: Receive DSME education by end of shift  Outcome: Progressing  Flowsheets (Taken 6/15/2025 1037)  Receive DSME education by end of shift: Provide patient centered education on Diabetic Self Management Education

## 2025-06-15 NOTE — PROGRESS NOTES
Maci Catalan is a 89 y.o. female     Less wheezing  Concerned about her COPD and the need for oxygen and getting short of breath on minimal exertion    Review of Systems     Constitutional: no fever, no chills, not feeling poorly, not feeling tired   Cardiovascular: no chest pain   Respiratory: Wheezing  Gastrointestinal: no abdominal pain, no constipation, no melena, no nausea, no diarrhea, no vomiting and no blood in stools.   Neurological: no headache,   All other systems have been reviewed and are negative for complaint.       Vitals:    06/15/25 1207   BP:    Pulse:    Resp:    Temp:    SpO2: 92%        Scheduled medications  Scheduled Medications[1]  Continuous medications  Continuous Medications[2]  PRN medications  PRN Medications[3]    Lab Review   Results from last 7 days   Lab Units 06/15/25  0606 06/14/25  0505 06/13/25  0506   WBC AUTO x10*3/uL 9.8 9.4 9.8   HEMOGLOBIN g/dL 7.9* 8.4* 8.3*   HEMATOCRIT % 25.4* 26.0* 28.0*   PLATELETS AUTO x10*3/uL 201 178 185     Results from last 7 days   Lab Units 06/15/25  0606 06/14/25  0505 06/13/25  0506 06/11/25  0437 06/10/25  1009   SODIUM mmol/L 139 139 137   < > 139   POTASSIUM mmol/L 3.8 3.6 3.7   < > 4.0   CHLORIDE mmol/L 108* 108* 106   < > 106   CO2 mmol/L 23 20* 21   < > 20*   BUN mg/dL 41* 41* 45*   < > 34*   CREATININE mg/dL 2.35* 2.43* 2.51*   < > 2.62*   CALCIUM mg/dL 7.7* 7.9* 8.1*   < > 7.5*   PROTEIN TOTAL g/dL  --   --  5.9*  5.9*  --  6.4   BILIRUBIN TOTAL mg/dL  --   --  0.3  --  0.3   ALK PHOS U/L  --   --  111  --  157*   ALT U/L  --   --  12  --  10   AST U/L  --   --  13  --  13   GLUCOSE mg/dL 120* 199* 141*   < > 192*    < > = values in this interval not displayed.     Results from last 7 days   Lab Units 06/11/25  1458 06/11/25  1010 06/11/25  0437   TROPHS ng/L 166* 196* 245*        CT chest wo IV contrast   Final Result   Moderate emphysema with central bronchial wall thickening. Small   patchy/nodular infiltrates in both lung  bases. Follow-up to ensure   resolution after appropriate treatment recommended.        Distal esophageal wall thickening. Correlate with possible   esophagitis.        Enlarged lymph node in the precarinal space. Attention at follow-up   recommended.        Small pericardial effusion.        Incidental findings in the upper abdomen including air in the biliary   and pancreatic ductal systems and probable sizable pancreatic duct   calculi.        Other findings as described above.        MACRO:   None.        Signed by: Irene Javed 6/12/2025 3:09 PM   Dictation workstation:   [id number]      US renal complete   Final Result   Normal size kidneys without hydronephrosis.        Right renal cysts.        Echoes/debris in the urinary bladder. Correlation with urinalysis may   be helpful.        MACRO:   None.        Signed by: Irene Javed 6/12/2025 1:31 PM   Dictation workstation:   [id number]      Transthoracic Echo Complete   Final Result      XR chest 1 view   Final Result   Stable cardiac pacemaker on the left.        Findings of underlying COPD and emphysema.        No significant or acute interval change.        MACRO:   None        Signed by: Dillon Catalan 6/10/2025 11:33 AM   Dictation workstation:   [id number]      US abdomen limited liver    (Results Pending)         Physical Exam    Constitutional   General appearance: Alert and in no acute distress.   Pulmonary   Respiratory assessment: On nasal cannula  Auscultation of Lungs: Clear to auscultation  Cardiovascular   Auscultation of heart: Apical pulse normal, heart rate and rhythm normal, normal S1 and S2, no murmurs and no pericardial rub.    Exam for edema: No peripheral edema.   Abdomen   Abdominal Exam: No bruits, normal bowel sounds, soft, non-tender, no abdominal mass palpated.    Liver and Spleen exam: No hepato-splenomegaly.   Musculoskeletal     Inspection/palpation of joints, bones and muscles: No joint swelling. Normal movement of all  extremities.   Neurologic   Cranial nerves: Nerves 2-12 were intact, no focal neuro defects.         Assessment/Plan      #Exacerbation of COPD  #Acute kidney injury  #Hypertension  #Esophagitis  #Anemia of chronic disease    Continue PPI  DuoNebs  Steroids and antibiotics  Wean off oxygen, suspect she might need to go home on oxygen based on her symptomatology  Would benefit from outpatient pulmonary rehab for her COPD  Kidney functions are improving       [1] cholecalciferol, 50 mcg, oral, Daily  epoetin fady or biosimilar, 100 Units/kg (Ideal), subcutaneous, Weekly  ferrous sulfate, 65 mg of elemental iron, oral, Daily with breakfast  heparin (porcine), 5,000 Units, subcutaneous, q8h LISA  insulin lispro, 0-5 Units, subcutaneous, Before meals & nightly  ipratropium-albuteroL, 3 mL, nebulization, TID  [Held by provider] losartan, 50 mg, oral, Daily  melatonin, 9 mg, oral, Nightly  methylPREDNISolone sodium succinate (PF), 40 mg, intravenous, q24h  metoprolol succinate XL, 25 mg, oral, Daily  pantoprazole, 40 mg, oral, Daily before breakfast  piperacillin-tazobactam, 2.25 g, intravenous, q8h  polyethylene glycol, 17 g, oral, Daily  sodium bicarbonate, 650 mg, oral, BID  sucralfate, 1 g, oral, Before meals & nightly     [2]    [3] PRN medications: acetaminophen **OR** acetaminophen **OR** acetaminophen, guaiFENesin, ipratropium-albuteroL, nitroglycerin, oxygen, sodium chloride

## 2025-06-15 NOTE — CARE PLAN
The clinical goals for the shift include pt will remain free of injury    Over the shift, the patient did not make progress toward the following goals.       Problem: Discharge Planning  Goal: Discharge to home or other facility with appropriate resources  Outcome: Progressing     Problem: Chronic Conditions and Co-morbidities  Goal: Patient's chronic conditions and co-morbidity symptoms are monitored and maintained or improved  Outcome: Progressing

## 2025-06-15 NOTE — PROGRESS NOTES
Maci Catalan is a 89 y.o. female on day 4 of admission presenting with Elevated troponin.  Patient with h/o HTN, DLP, CAD s/p PCI, HFrED, AAA s/p EVAR in 2019, complete heart block s/p PPM, PAD, CKD, T2DM, hiatal hernia, esophagitis, GI bleed due to PUD, pancreatic cancer s/p Whipple procedure, who p/w cough and URI symptoms x 1 week. In the ED found to be hypoxic requiring supplemental O2. Work up also revealed MOOK on CKD, very high BP consistent with hypertensive urgency/emergency, elevated troponin and concern for emphysema on chest imaging. Admitted to [facility] for NSTEMI, MOOK on CK and blood pressure control. Pulmonary is consulted for COPD.   Subjective   No acute overnight events. O2 requirements stable at 1L.    Overall is feeling the same as yesterday. SOB stable, still had CHRISTIANSON. Also +ve dry cough. No wheezing or any pains.   Objective   Scheduled medications  cholecalciferol, 50 mcg, oral, Daily  epoetin fady or biosimilar, 100 Units/kg (Ideal), subcutaneous, Weekly  ferrous sulfate, 65 mg of elemental iron, oral, Daily with breakfast  heparin (porcine), 5,000 Units, subcutaneous, q8h LISA  insulin lispro, 0-5 Units, subcutaneous, Before meals & nightly  ipratropium-albuteroL, 3 mL, nebulization, TID  [Held by provider] losartan, 50 mg, oral, Daily  melatonin, 9 mg, oral, Nightly  methylPREDNISolone sodium succinate (PF), 40 mg, intravenous, q24h  metoprolol succinate XL, 25 mg, oral, Daily  pantoprazole, 40 mg, oral, Daily before breakfast  piperacillin-tazobactam, 2.25 g, intravenous, q8h  polyethylene glycol, 17 g, oral, Daily  sodium bicarbonate, 650 mg, oral, BID  sucralfate, 1 g, oral, Before meals & nightly    Continuous medications     PRN medications  PRN medications: acetaminophen **OR** acetaminophen **OR** acetaminophen, guaiFENesin, ipratropium-albuteroL, nitroglycerin, oxygen, sodium chloride   Physical Exam  Constitutional:       General: She is not in acute distress.     Appearance: She is  normal weight. She is not ill-appearing or toxic-appearing.   HENT:      Head: Normocephalic and atraumatic.      Nose:      Comments: On 1L NC     Mouth/Throat:      Mouth: Mucous membranes are moist.      Comments: Mallampati 2.   Eyes:      General: No scleral icterus.     Extraocular Movements: Extraocular movements intact.      Pupils: Pupils are equal, round, and reactive to light.   Cardiovascular:      Rate and Rhythm: Normal rate and regular rhythm.      Heart sounds: No murmur heard.     No friction rub. No gallop.   Pulmonary:      Effort: No respiratory distress.      Breath sounds: No wheezing or rales.      Comments: Clear lung fields b/l with fair air entry.   Abdominal:      General: Bowel sounds are normal. There is no distension.      Palpations: Abdomen is soft.      Tenderness: There is no abdominal tenderness.   Musculoskeletal:         General: No tenderness. Normal range of motion.      Cervical back: Normal range of motion and neck supple. No rigidity or tenderness.      Right lower leg: No edema.      Left lower leg: No edema.   Lymphadenopathy:      Cervical: No cervical adenopathy.   Skin:     General: Skin is warm and dry.      Coloration: Skin is not jaundiced.   Neurological:      General: No focal deficit present.      Mental Status: She is alert and oriented to person, place, and time.      Cranial Nerves: No cranial nerve deficit.      Motor: No weakness.   Psychiatric:         Mood and Affect: Mood normal.         Behavior: Behavior normal.     Last Recorded Vitals  Blood pressure 138/69, pulse 79, temperature 37.1 °C (98.8 °F), temperature source Temporal, resp. rate 16, height 1.524 m (5'), weight (!) 43.1 kg (95 lb), SpO2 93%.  Intake/Output last 3 Shifts:  I/O last 3 completed shifts:  In: 820 (19 mL/kg) [P.O.:720; IV Piggyback:100]  Out: - (0 mL/kg)   Weight: 43.1 kg     Relevant Results  Results for orders placed or performed during the hospital encounter of 06/10/25 (from the  past 24 hours)   POCT GLUCOSE   Result Value Ref Range    POCT Glucose 340 (H) 74 - 99 mg/dL   POCT GLUCOSE   Result Value Ref Range    POCT Glucose 355 (H) 74 - 99 mg/dL   CBC   Result Value Ref Range    WBC 9.8 4.4 - 11.3 x10*3/uL    nRBC 0.0 0.0 - 0.0 /100 WBCs    RBC 3.44 (L) 4.00 - 5.20 x10*6/uL    Hemoglobin 7.9 (L) 12.0 - 16.0 g/dL    Hematocrit 25.4 (L) 36.0 - 46.0 %    MCV 74 (L) 80 - 100 fL    MCH 23.0 (L) 26.0 - 34.0 pg    MCHC 31.1 (L) 32.0 - 36.0 g/dL    RDW 15.4 (H) 11.5 - 14.5 %    Platelets 201 150 - 450 x10*3/uL   Basic Metabolic Panel   Result Value Ref Range    Glucose 120 (H) 74 - 99 mg/dL    Sodium 139 136 - 145 mmol/L    Potassium 3.8 3.5 - 5.3 mmol/L    Chloride 108 (H) 98 - 107 mmol/L    Bicarbonate 23 21 - 32 mmol/L    Anion Gap 12 10 - 20 mmol/L    Urea Nitrogen 41 (H) 6 - 23 mg/dL    Creatinine 2.35 (H) 0.50 - 1.05 mg/dL    eGFR 19 (L) >60 mL/min/1.73m*2    Calcium 7.7 (L) 8.6 - 10.3 mg/dL   POCT GLUCOSE   Result Value Ref Range    POCT Glucose 143 (H) 74 - 99 mg/dL   POCT GLUCOSE   Result Value Ref Range    POCT Glucose 270 (H) 74 - 99 mg/dL   US abdomen limited liver  Result Date: 6/15/2025  Interpreted By:  Dillon Miguel, STUDY: US ABDOMEN LIMITED LIVER;  6/13/2025 8:14 pm   INDICATION: Signs/Symptoms:Hypodensity seen on CT scan.     COMPARISON: CT chest 06/12/2025   ACCESSION NUMBER(S): [id number]   ORDERING CLINICIAN: TOM ANAYA   TECHNIQUE: Grayscale and color Doppler sonographic imaging of the right upper quadrant.   FINDINGS: LIVER: The liver is normal in size. There is cirrhotic morphology with heterogeneous echotexture and nodular contour. The left hepatic lobe subcapsular location there is a 1.1 cm x 1 cm x 1.1 cm simple cyst corresponds to the hypodensity noted on 06/12/2025 CT chest.   BILE DUCTS: No intrahepatic or extrahepatic bile duct dilatation. Extrahepatic bile duct = 0.3 cm   GALLBLADDER:  Surgically absent. The sonographic Huffman sign is negative.    PANCREAS: Obscured by bowel gas.   RIGHT KIDNEY: The right kidney is normal in size. Multiple simple cysts measuring up to 1.6 cm in the upper pole. No hydronephrosis. No renal calculus. No perinephric fluid.   PERITONEUM/OTHER: No upper abdominal ascites.       Simple cyst in the left hepatic lobe corresponding to the hypodensity noted on CT and measures 1.1 cm.   Cirrhotic liver morphology.   Status post cholecystectomy.   MACRO: None.   Signed by: Dillon Miguel 6/15/2025 2:02 PM Dictation workstation:   [id number]    Assessment & Plan  Elevated troponin    Benign essential HTN    3-vessel coronary artery disease    Chronic systolic congestive heart failure    Diabetes mellitus type 2, noninsulin dependent (Multi)    Acute bronchitis, unspecified organism    Hyperglycemia    89 YOF with h/o HTN, DLP, CAD s/p PCI, HFrEF, AAA s/p EVAR in 2019, complete heart block s/p PPM, PAD, CKD, T2DM, hiatal hernia, esophagitis, GI bleed due to PUD, pancreatic cancer s/p Whipple procedure, who p/w cough and URI symptoms x 1 week. In the ED found to be hypoxic requiring supplemental O2. Work up also revealed MOOK on CKD, very high BP consistent with hypertensive urgency/emergency, elevated troponin and concern for emphysema on chest imaging. Admitted to [facility] for NSTEMI, MOOK on CK and blood pressure control. Pulmonary is consulted for COPD.      Respiratory failure: acute with hypoxia, likely due to HFrEF and also hypertensive urgency/emergency. O2 need currently minimal.       Continue supplemental O2, wean off as tolerates       Home O2 evaluation before DC       BPH with IS, Acapella,       Management of the individual causes as below.      Emphysema/COPD: states she was told she had emphysema many years ago, not on any inhaler at home. Baseline functional status OK, no wheezing. CT moderate emphysema.        Continue Duo-Neb       PFT as outpatient       Agreed with discharging on rescue inhaler.        Pulmonary referral  after DC     HFrEF: EF 45%.        Volume status optimization as per primary team and cardiology       Currently seems euvolemic on my exam     Pulmonary HTN: very mild, likely group 2. RV size and systolic function normal.        CHF management as above.      DVT prophylaxis: currently on SCD and subcutaneous heparin.      Pulmonary will FU while in house.     Mirta Lee MD

## 2025-06-15 NOTE — PROGRESS NOTES
Maci Catalan is a 89 y.o. female on day 4 of admission presenting with Elevated troponin.      Subjective   Maci Catalan is a 89 y.o. female with a past medical history of NSTEMI, AAA  status post EVAR 11/2019, 3v coronary artery disease status post PCI with drug-eluting stent placement in November 2019 with an LVEF of 35%,, HFrEF, complete heart block status post Medtronic dual-chamber system, history of pancreatic tumor status post Whipple procedure who presented with flulike symptoms with cough and shortness of breath.  In the ED, she was found to be hypoxic to 90% on room air.  Was placed on nasal cannula.  She was noted to have severe hypertension with a blood pressure of 245/96 mmHg.  Chest x-ray shows COPD/emphysematous changes with no acute pathology.  She was noted to have a creatinine of 2.62 mg/dL worsened from 1.37 mg/dL in July 2024 thus nephrology is consulted.     Seen and examined.   Transferred to the [facility].  ADELAIDA. She does not offer specific complaints.        Objective        Vitals 24HR  Heart Rate:  []   Temp:  [36.2 °C (97.2 °F)-37 °C (98.6 °F)]   Resp:  [16-18]   BP: (103-160)/(55-98)   SpO2:  [90 %-99 %]     Intake/Output last 3 Shifts:    Intake/Output Summary (Last 24 hours) at 6/15/2025 1108  Last data filed at 6/15/2025 0859  Gross per 24 hour   Intake 840 ml   Output --   Net 840 ml       Physical Exam  General:  Patient is awake, alert, and oriented.  Patient is in no acute distress.  HEENT:  Pupils equal and reactive.  Normocephalic.  Moist mucosa.    Neck:  No thyromegaly.  Normal Jugular Venous Pressure.  Cardiovascular:  Regular rate and rhythm.  Normal S1 and S2. 1/6 GLORIA.  Pulmonary:  Clear to auscultation bilaterally.  Abdomen:  Soft. Non-tender.   Non-distended.  Positive bowel sounds.  Lower Extremities:  2+ pedal pulses. No LE edema.  Neurologic:  Cranial nerves intact.  No focal deficit.   Skin: Skin warm and dry, normal skin turgor.   Psychiatric: Normal  affect.    Scheduled Medications  Scheduled Medications[1]  Continuous medications  Continuous Medications[2]    PRN Medications[3]     Relevant Results  Results from last 7 days   Lab Units 06/15/25  0606 06/14/25  0505 06/13/25  0506 06/11/25  1010 06/10/25  1009   WBC AUTO x10*3/uL 9.8 9.4 9.8   < > 6.5   HEMOGLOBIN g/dL 7.9* 8.4* 8.3*   < > 10.0*   HEMATOCRIT % 25.4* 26.0* 28.0*   < > 33.9*   PLATELETS AUTO x10*3/uL 201 178 185   < > 208   NEUTROS PCT AUTO %  --   --   --   --  80.1   LYMPHS PCT AUTO %  --   --   --   --  10.6   MONOS PCT AUTO %  --   --   --   --  6.5   EOS PCT AUTO %  --   --   --   --  2.0    < > = values in this interval not displayed.     Results from last 7 days   Lab Units 06/15/25  0606 06/14/25  0505 06/13/25  0506   SODIUM mmol/L 139 139 137   POTASSIUM mmol/L 3.8 3.6 3.7   CHLORIDE mmol/L 108* 108* 106   CO2 mmol/L 23 20* 21   BUN mg/dL 41* 41* 45*   CREATININE mg/dL 2.35* 2.43* 2.51*   GLUCOSE mg/dL 120* 199* 141*   CALCIUM mg/dL 7.7* 7.9* 8.1*       CT chest wo IV contrast   Final Result   Moderate emphysema with central bronchial wall thickening. Small   patchy/nodular infiltrates in both lung bases. Follow-up to ensure   resolution after appropriate treatment recommended.        Distal esophageal wall thickening. Correlate with possible   esophagitis.        Enlarged lymph node in the precarinal space. Attention at follow-up   recommended.        Small pericardial effusion.        Incidental findings in the upper abdomen including air in the biliary   and pancreatic ductal systems and probable sizable pancreatic duct   calculi.        Other findings as described above.        MACRO:   None.        Signed by: Irene Javed 6/12/2025 3:09 PM   Dictation workstation:   [id number]      US renal complete   Final Result   Normal size kidneys without hydronephrosis.        Right renal cysts.        Echoes/debris in the urinary bladder. Correlation with urinalysis may   be helpful.         MACRO:   None.        Signed by: Irene Javed 6/12/2025 1:31 PM   Dictation workstation:   [id number]      Transthoracic Echo Complete   Final Result      XR chest 1 view   Final Result   Stable cardiac pacemaker on the left.        Findings of underlying COPD and emphysema.        No significant or acute interval change.        MACRO:   None        Signed by: Dillon Catalan 6/10/2025 11:33 AM   Dictation workstation:   [id number]      US abdomen limited liver    (Results Pending)            Assessment/Plan      Maci Catalan is a 89 y.o. female with a past medical history of NSTEMI, AAA  status post EVAR 11/2019, 3v coronary artery disease status post PCI with drug-eluting stent placement in November 2019 with an LVEF of 35%,, HFrEF, complete heart block status post Medtronic dual-chamber system, history of pancreatic tumor status post Whipple procedure who presented with flulike symptoms with cough and shortness of breath.  In the ED, she was found to be hypoxic to 90% on room air.  Was placed on nasal cannula.  She was noted to have severe hypertension with a blood pressure of 245/96 mmHg.  Chest x-ray shows COPD/emphysematous changes with no acute pathology.  She was noted to have a creatinine of 2.62 mg/dL worsened from 1.37 mg/dL in July 2024 thus nephrology is consulted.     Ms. Catalan has a history of stage G3b chronic kidney disease with a baseline creatinine of 1.3 up to as high as 1.8 mg/deciliter as of last July.  She had an acute kidney injury back then with her last creatinine settling at 1.37.  Since that time, she was noted to have a drop in EF back down to 35% in February of this year.  Following this, she was placed on losartan 50 mg from low-dose ACE inhibition with lisinopril 2.5 mg daily.  She was also added on Jardiance.  There was no repeat creatinine up until this admission.  She possibly may have an acute kidney injury versus progressive kidney disease in the setting of a reduced EF  +/- her medication adjustment with higher dose RAAS blockade and an SGLT2 inhibitor.  I favor the later.  There is no clear history to support an acute kidney injury per se.  She denies nausea, emesis or diarrhea.  No anti-inflammatory use.  No further medication and/or nephrotoxin exposure. Renal US was unremarkable. She has protein, blood, glucose, lueks and wbc's in the urine. She has 1 gm of proteinuria . Her intact PTH is elevated and suggestive of more advanced CKD. Vitamin D is low thus I will start her on on 2,000 units daily. I have reviewed iron stores/ferritin and will start her on a daily iron supplement and order erythropoietin. SPEP, K/L free light chains to rule out a paraprotein are pending.  She has stable renal function.  Continue sodium bicarbonate 650 mg twice daily, her acidosis has resolved.  Trend her RFP.  Will follow with her care.    Assessment & Plan  Elevated troponin    Acute bronchitis, unspecified organism    Benign essential HTN    3-vessel coronary artery disease    Chronic systolic congestive heart failure    Diabetes mellitus type 2, noninsulin dependent (Multi)    Hyperglycemia      I spent 45 minutes in the professional and overall care of this patient.      Sixto Resendiz,            [1] ferrous sulfate, 65 mg of elemental iron, oral, Daily with breakfast  heparin (porcine), 5,000 Units, subcutaneous, q8h LISA  insulin lispro, 0-5 Units, subcutaneous, Before meals & nightly  ipratropium-albuteroL, 3 mL, nebulization, TID  [Held by provider] losartan, 50 mg, oral, Daily  melatonin, 9 mg, oral, Nightly  methylPREDNISolone sodium succinate (PF), 40 mg, intravenous, q24h  metoprolol succinate XL, 25 mg, oral, Daily  pantoprazole, 40 mg, oral, Daily before breakfast  piperacillin-tazobactam, 2.25 g, intravenous, q8h  polyethylene glycol, 17 g, oral, Daily  sodium bicarbonate, 650 mg, oral, BID  sucralfate, 1 g, oral, Before meals & nightly     [2]    [3] PRN medications:  acetaminophen **OR** acetaminophen **OR** acetaminophen, guaiFENesin, ipratropium-albuteroL, nitroglycerin, oxygen, sodium chloride

## 2025-06-16 ENCOUNTER — APPOINTMENT (OUTPATIENT)
Dept: CARDIOLOGY | Facility: HOSPITAL | Age: OVER 89
DRG: 280 | End: 2025-06-16
Payer: MEDICARE

## 2025-06-16 LAB
ANION GAP SERPL CALC-SCNC: 15 MMOL/L (ref 10–20)
BUN SERPL-MCNC: 38 MG/DL (ref 6–23)
CALCIUM SERPL-MCNC: 8.1 MG/DL (ref 8.6–10.3)
CHLORIDE SERPL-SCNC: 107 MMOL/L (ref 98–107)
CO2 SERPL-SCNC: 20 MMOL/L (ref 21–32)
CREAT SERPL-MCNC: 2.43 MG/DL (ref 0.5–1.05)
EGFRCR SERPLBLD CKD-EPI 2021: 19 ML/MIN/1.73M*2
GLUCOSE BLD MANUAL STRIP-MCNC: 202 MG/DL (ref 74–99)
GLUCOSE BLD MANUAL STRIP-MCNC: 233 MG/DL (ref 74–99)
GLUCOSE BLD MANUAL STRIP-MCNC: 254 MG/DL (ref 74–99)
GLUCOSE BLD MANUAL STRIP-MCNC: 443 MG/DL (ref 74–99)
GLUCOSE SERPL-MCNC: 284 MG/DL (ref 74–99)
HOLD SPECIMEN: NORMAL
HOLD SPECIMEN: NORMAL
POTASSIUM SERPL-SCNC: 3.8 MMOL/L (ref 3.5–5.3)
SODIUM SERPL-SCNC: 138 MMOL/L (ref 136–145)

## 2025-06-16 PROCEDURE — 2500000002 HC RX 250 W HCPCS SELF ADMINISTERED DRUGS (ALT 637 FOR MEDICARE OP, ALT 636 FOR OP/ED): Performed by: NURSE PRACTITIONER

## 2025-06-16 PROCEDURE — 2500000005 HC RX 250 GENERAL PHARMACY W/O HCPCS: Performed by: NURSE PRACTITIONER

## 2025-06-16 PROCEDURE — 94664 DEMO&/EVAL PT USE INHALER: CPT

## 2025-06-16 PROCEDURE — 2500000004 HC RX 250 GENERAL PHARMACY W/ HCPCS (ALT 636 FOR OP/ED): Performed by: INTERNAL MEDICINE

## 2025-06-16 PROCEDURE — 99233 SBSQ HOSP IP/OBS HIGH 50: CPT | Performed by: INTERNAL MEDICINE

## 2025-06-16 PROCEDURE — 94640 AIRWAY INHALATION TREATMENT: CPT

## 2025-06-16 PROCEDURE — 82947 ASSAY GLUCOSE BLOOD QUANT: CPT

## 2025-06-16 PROCEDURE — 36415 COLL VENOUS BLD VENIPUNCTURE: CPT | Performed by: NURSE PRACTITIONER

## 2025-06-16 PROCEDURE — 93005 ELECTROCARDIOGRAM TRACING: CPT

## 2025-06-16 PROCEDURE — 83036 HEMOGLOBIN GLYCOSYLATED A1C: CPT | Mod: AHULAB | Performed by: FAMILY MEDICINE

## 2025-06-16 PROCEDURE — 94667 MNPJ CHEST WALL 1ST: CPT

## 2025-06-16 PROCEDURE — 2500000001 HC RX 250 WO HCPCS SELF ADMINISTERED DRUGS (ALT 637 FOR MEDICARE OP): Performed by: NURSE PRACTITIONER

## 2025-06-16 PROCEDURE — 97110 THERAPEUTIC EXERCISES: CPT | Mod: GP,CQ

## 2025-06-16 PROCEDURE — 99232 SBSQ HOSP IP/OBS MODERATE 35: CPT | Performed by: CLINICAL NURSE SPECIALIST

## 2025-06-16 PROCEDURE — 97116 GAIT TRAINING THERAPY: CPT | Mod: GP,CQ

## 2025-06-16 PROCEDURE — 80048 BASIC METABOLIC PNL TOTAL CA: CPT | Performed by: NURSE PRACTITIONER

## 2025-06-16 PROCEDURE — 1200000002 HC GENERAL ROOM WITH TELEMETRY DAILY

## 2025-06-16 PROCEDURE — 2500000004 HC RX 250 GENERAL PHARMACY W/ HCPCS (ALT 636 FOR OP/ED): Performed by: NURSE PRACTITIONER

## 2025-06-16 PROCEDURE — 2500000005 HC RX 250 GENERAL PHARMACY W/O HCPCS: Performed by: FAMILY MEDICINE

## 2025-06-16 PROCEDURE — 2500000001 HC RX 250 WO HCPCS SELF ADMINISTERED DRUGS (ALT 637 FOR MEDICARE OP): Performed by: INTERNAL MEDICINE

## 2025-06-16 RX ADMIN — SUCRALFATE 1 G: 1 TABLET ORAL at 16:55

## 2025-06-16 RX ADMIN — Medication 50 MCG: at 08:42

## 2025-06-16 RX ADMIN — PANTOPRAZOLE SODIUM 40 MG: 40 TABLET, DELAYED RELEASE ORAL at 05:53

## 2025-06-16 RX ADMIN — Medication 9 MG: at 22:52

## 2025-06-16 RX ADMIN — SODIUM BICARBONATE 650 MG: 650 TABLET ORAL at 08:42

## 2025-06-16 RX ADMIN — METOPROLOL SUCCINATE 25 MG: 25 TABLET, EXTENDED RELEASE ORAL at 08:42

## 2025-06-16 RX ADMIN — PIPERACILLIN SODIUM AND TAZOBACTAM SODIUM 2.25 G: 2; .25 INJECTION, SOLUTION INTRAVENOUS at 05:52

## 2025-06-16 RX ADMIN — SUCRALFATE 1 G: 1 TABLET ORAL at 05:53

## 2025-06-16 RX ADMIN — HEPARIN SODIUM 5000 UNITS: 5000 INJECTION, SOLUTION INTRAVENOUS; SUBCUTANEOUS at 14:24

## 2025-06-16 RX ADMIN — INSULIN LISPRO 2 UNITS: 100 INJECTION, SOLUTION INTRAVENOUS; SUBCUTANEOUS at 11:58

## 2025-06-16 RX ADMIN — SUCRALFATE 1 G: 1 TABLET ORAL at 22:52

## 2025-06-16 RX ADMIN — FERROUS SULFATE TAB 325 MG (65 MG ELEMENTAL FE) 1 TABLET: 325 (65 FE) TAB at 08:42

## 2025-06-16 RX ADMIN — HEPARIN SODIUM 5000 UNITS: 5000 INJECTION, SOLUTION INTRAVENOUS; SUBCUTANEOUS at 22:52

## 2025-06-16 RX ADMIN — Medication 1 L/MIN: at 08:03

## 2025-06-16 RX ADMIN — INSULIN LISPRO 2 UNITS: 100 INJECTION, SOLUTION INTRAVENOUS; SUBCUTANEOUS at 16:55

## 2025-06-16 RX ADMIN — ACETAMINOPHEN 650 MG: 325 TABLET, FILM COATED ORAL at 22:51

## 2025-06-16 RX ADMIN — SUCRALFATE 1 G: 1 TABLET ORAL at 12:04

## 2025-06-16 RX ADMIN — PIPERACILLIN SODIUM AND TAZOBACTAM SODIUM 2.25 G: 2; .25 INJECTION, SOLUTION INTRAVENOUS at 22:51

## 2025-06-16 RX ADMIN — METHYLPREDNISOLONE SODIUM SUCCINATE 40 MG: 125 INJECTION, POWDER, FOR SOLUTION INTRAMUSCULAR; INTRAVENOUS at 14:27

## 2025-06-16 RX ADMIN — IPRATROPIUM BROMIDE AND ALBUTEROL SULFATE 3 ML: 2.5; .5 SOLUTION RESPIRATORY (INHALATION) at 20:51

## 2025-06-16 RX ADMIN — IPRATROPIUM BROMIDE AND ALBUTEROL SULFATE 3 ML: 2.5; .5 SOLUTION RESPIRATORY (INHALATION) at 08:03

## 2025-06-16 RX ADMIN — PIPERACILLIN SODIUM AND TAZOBACTAM SODIUM 2.25 G: 2; .25 INJECTION, SOLUTION INTRAVENOUS at 14:28

## 2025-06-16 RX ADMIN — INSULIN LISPRO 5 UNITS: 100 INJECTION, SOLUTION INTRAVENOUS; SUBCUTANEOUS at 22:54

## 2025-06-16 RX ADMIN — HEPARIN SODIUM 5000 UNITS: 5000 INJECTION, SOLUTION INTRAVENOUS; SUBCUTANEOUS at 05:52

## 2025-06-16 RX ADMIN — INSULIN LISPRO 3 UNITS: 100 INJECTION, SOLUTION INTRAVENOUS; SUBCUTANEOUS at 07:04

## 2025-06-16 RX ADMIN — SODIUM BICARBONATE 650 MG: 650 TABLET ORAL at 22:51

## 2025-06-16 RX ADMIN — IPRATROPIUM BROMIDE AND ALBUTEROL SULFATE 3 ML: 2.5; .5 SOLUTION RESPIRATORY (INHALATION) at 15:46

## 2025-06-16 ASSESSMENT — PAIN SCALES - GENERAL
PAINLEVEL_OUTOF10: 0 - NO PAIN
PAINLEVEL_OUTOF10: 3
PAINLEVEL_OUTOF10: 9
PAINLEVEL_OUTOF10: 0 - NO PAIN

## 2025-06-16 ASSESSMENT — COGNITIVE AND FUNCTIONAL STATUS - GENERAL
TURNING FROM BACK TO SIDE WHILE IN FLAT BAD: A LITTLE
MOVING FROM LYING ON BACK TO SITTING ON SIDE OF FLAT BED WITH BEDRAILS: A LITTLE
MOBILITY SCORE: 18
MOVING TO AND FROM BED TO CHAIR: A LITTLE
WALKING IN HOSPITAL ROOM: A LITTLE
STANDING UP FROM CHAIR USING ARMS: A LITTLE
CLIMB 3 TO 5 STEPS WITH RAILING: A LITTLE

## 2025-06-16 ASSESSMENT — PAIN DESCRIPTION - DESCRIPTORS
DESCRIPTORS: ACHING
DESCRIPTORS: ACHING

## 2025-06-16 ASSESSMENT — PAIN - FUNCTIONAL ASSESSMENT
PAIN_FUNCTIONAL_ASSESSMENT: 0-10

## 2025-06-16 ASSESSMENT — PAIN DESCRIPTION - LOCATION: LOCATION: HEAD

## 2025-06-16 ASSESSMENT — PAIN DESCRIPTION - ORIENTATION: ORIENTATION: MID

## 2025-06-16 NOTE — CARE PLAN
Problem: Skin  Goal: Promote skin healing  Outcome: Progressing   The patient's goals for the shift include      The clinical goals for the shift include pt remains hds    Over the shift, the patient did not make progress toward the following goals. Barriers to progression include . Recommendations to address these barriers include [id number] GOOD NUTRITION.

## 2025-06-16 NOTE — PROGRESS NOTES
Maci Catalan is a 89 y.o. female on day 5 of admission presenting with Elevated troponin.      Subjective   Maci Catalan is a 89 y.o. female with a past medical history of NSTEMI, AAA  status post EVAR 11/2019, 3v coronary artery disease status post PCI with drug-eluting stent placement in November 2019 with an LVEF of 35%,, HFrEF, complete heart block status post Medtronic dual-chamber system, history of pancreatic tumor status post Whipple procedure who presented with flulike symptoms with cough and shortness of breath.  In the ED, she was found to be hypoxic to 90% on room air.  Was placed on nasal cannula.  She was noted to have severe hypertension with a blood pressure of 245/96 mmHg.  Chest x-ray shows COPD/emphysematous changes with no acute pathology.  She was noted to have a creatinine of 2.62 mg/dL worsened from 1.37 mg/dL in July 2024 thus nephrology is consulted.     6/16/2025: Creatinine plateaued at 2.4, which may be her new baseline.  Baseline creatinine was 1.8 few months back.  Urine output not documented, requested for strict I's and O's.       Objective        Vitals 24HR  Heart Rate:  [78-98]   Temp:  [36.7 °C (98.1 °F)-37.1 °C (98.8 °F)]   Resp:  [15-16]   BP: (117-142)/(63-90)   SpO2:  [92 %-96 %]     Intake/Output last 3 Shifts:    Intake/Output Summary (Last 24 hours) at 6/16/2025 1523  Last data filed at 6/16/2025 1445  Gross per 24 hour   Intake 1570 ml   Output 0 ml   Net 1570 ml       Physical Exam  General:  Patient is awake, alert, and oriented.  Patient is in no acute distress.  HEENT:  Pupils equal and reactive.  Normocephalic.  Moist mucosa.    Neck:  No thyromegaly.  Normal Jugular Venous Pressure.  Cardiovascular:  Regular rate and rhythm.  Normal S1 and S2. 1/6 GLORIA.  Pulmonary:  Clear to auscultation bilaterally.  Abdomen:  Soft. Non-tender.   Non-distended.  Positive bowel sounds.  Lower Extremities:  2+ pedal pulses. No LE edema.  Neurologic:  Cranial nerves intact.   No focal deficit.   Skin: Skin warm and dry, normal skin turgor.   Psychiatric: Normal affect.    Scheduled Medications  Scheduled Medications[1]  Continuous medications  Continuous Medications[2]    PRN Medications[3]     Relevant Results  Results from last 7 days   Lab Units 06/15/25  0606 06/14/25  0505 06/13/25  0506 06/11/25  1010 06/10/25  1009   WBC AUTO x10*3/uL 9.8 9.4 9.8   < > 6.5   HEMOGLOBIN g/dL 7.9* 8.4* 8.3*   < > 10.0*   HEMATOCRIT % 25.4* 26.0* 28.0*   < > 33.9*   PLATELETS AUTO x10*3/uL 201 178 185   < > 208   NEUTROS PCT AUTO %  --   --   --   --  80.1   LYMPHS PCT AUTO %  --   --   --   --  10.6   MONOS PCT AUTO %  --   --   --   --  6.5   EOS PCT AUTO %  --   --   --   --  2.0    < > = values in this interval not displayed.     Results from last 7 days   Lab Units 06/16/25  0524 06/15/25  0606 06/14/25  0505   SODIUM mmol/L 138 139 139   POTASSIUM mmol/L 3.8 3.8 3.6   CHLORIDE mmol/L 107 108* 108*   CO2 mmol/L 20* 23 20*   BUN mg/dL 38* 41* 41*   CREATININE mg/dL 2.43* 2.35* 2.43*   GLUCOSE mg/dL 284* 120* 199*   CALCIUM mg/dL 8.1* 7.7* 7.9*       US abdomen limited liver   Final Result   Simple cyst in the left hepatic lobe corresponding to the hypodensity   noted on CT and measures 1.1 cm.        Cirrhotic liver morphology.        Status post cholecystectomy.        MACRO:   None.        Signed by: Dillon Miguel 6/15/2025 2:02 PM   Dictation workstation:   [id number]      CT chest wo IV contrast   Final Result   Moderate emphysema with central bronchial wall thickening. Small   patchy/nodular infiltrates in both lung bases. Follow-up to ensure   resolution after appropriate treatment recommended.        Distal esophageal wall thickening. Correlate with possible   esophagitis.        Enlarged lymph node in the precarinal space. Attention at follow-up   recommended.        Small pericardial effusion.        Incidental findings in the upper abdomen including air in the biliary   and  pancreatic ductal systems and probable sizable pancreatic duct   calculi.        Other findings as described above.        MACRO:   None.        Signed by: Irene Javed 6/12/2025 3:09 PM   Dictation workstation:   [id number]      US renal complete   Final Result   Normal size kidneys without hydronephrosis.        Right renal cysts.        Echoes/debris in the urinary bladder. Correlation with urinalysis may   be helpful.        MACRO:   None.        Signed by: Irene Javed 6/12/2025 1:31 PM   Dictation workstation:   [id number]      Transthoracic Echo Complete   Final Result      XR chest 1 view   Final Result   Stable cardiac pacemaker on the left.        Findings of underlying COPD and emphysema.        No significant or acute interval change.        MACRO:   None        Signed by: Dillon Catalan 6/10/2025 11:33 AM   Dictation workstation:   [id number]               Assessment/Plan      Maci Catalan is a 89 y.o. female with a past medical history of NSTEMI, AAA  status post EVAR 11/2019, 3v coronary artery disease status post PCI with drug-eluting stent placement in November 2019 with an LVEF of 35%,, HFrEF, complete heart block status post Medtronic dual-chamber system, history of pancreatic tumor status post Whipple procedure who presented with flulike symptoms with cough and shortness of breath.  In the ED, she was found to be hypoxic to 90% on room air.  Was placed on nasal cannula.  She was noted to have severe hypertension with a blood pressure of 245/96 mmHg.  Chest x-ray shows COPD/emphysematous changes with no acute pathology.  She was noted to have a creatinine of 2.62 mg/dL worsened from 1.37 mg/dL in July 2024 thus nephrology is consulted.     Ms. Catalan has a history of stage G3b chronic kidney disease with a baseline creatinine of 1.3 up to as high as 1.8 mg/deciliter as of last July.  She had an acute kidney injury back then with her last creatinine settling at 1.37.  Since that time, she  was noted to have a drop in EF back down to 35% in February of this year.  Following this, she was placed on losartan 50 mg from low-dose ACE inhibition with lisinopril 2.5 mg daily.  She was also added on Jardiance.  There was no repeat creatinine up until this admission.  She possibly may have an acute kidney injury versus progressive kidney disease in the setting of a reduced EF +/- her medication adjustment with higher dose RAAS blockade and an SGLT2 inhibitor.  I favor the later.  There is no clear history to support an acute kidney injury per se.  She denies nausea, emesis or diarrhea.  No anti-inflammatory use.  No further medication and/or nephrotoxin exposure. Renal US was unremarkable. She has protein, blood, glucose, lueks and wbc's in the urine. She has 1 gm of proteinuria . Her intact PTH is elevated and suggestive of more advanced CKD. Vitamin D is low thus I will start her on on 2,000 units daily. Reviewed iron stores/ferritin and will start her on a daily iron supplement and order erythropoietin. Continue sodium bicarbonate 650 mg twice daily, her acidosis has resolved.  Trend her RFP.  Will follow with her care.    6/16/2025: Creatinine plateaued at 2.4, which may be her new baseline.  Baseline creatinine was 1.8 few months back.  Urine output not documented, requested for strict I's and O's.  Maintain MAP more than 65 to maintain perfusion to the kidney.  Kappa and lambda free light chains are elevated but the ratio is preserved, not suggesting monoclonal gammopathy.  Serum protein electrophoresis pending.  Follow-up with Dr. Resendiz or any nephrologist as outpatient.  As the creatinine has plateaued will monitor her peripherally while in the hospital.  Please call us back if you have any questions.     Assessment & Plan  Elevated troponin    Acute bronchitis, unspecified organism    Benign essential HTN    3-vessel coronary artery disease    Chronic systolic congestive heart failure    Diabetes  mellitus type 2, noninsulin dependent (Multi)    Hyperglycemia      I spent 50 minutes in the professional and overall care of this patient.      Davide Merritt MD           [1] cholecalciferol, 50 mcg, oral, Daily  epoetin fady or biosimilar, 100 Units/kg (Ideal), subcutaneous, Weekly  ferrous sulfate, 65 mg of elemental iron, oral, Daily with breakfast  heparin (porcine), 5,000 Units, subcutaneous, q8h LISA  insulin lispro, 0-5 Units, subcutaneous, Before meals & nightly  ipratropium-albuteroL, 3 mL, nebulization, TID  [Held by provider] losartan, 50 mg, oral, Daily  melatonin, 9 mg, oral, Nightly  methylPREDNISolone sodium succinate (PF), 40 mg, intravenous, q24h  metoprolol succinate XL, 25 mg, oral, Daily  pantoprazole, 40 mg, oral, Daily before breakfast  piperacillin-tazobactam, 2.25 g, intravenous, q8h  polyethylene glycol, 17 g, oral, Daily  sodium bicarbonate, 650 mg, oral, BID  sucralfate, 1 g, oral, Before meals & nightly     [2]    [3] PRN medications: acetaminophen **OR** acetaminophen **OR** acetaminophen, guaiFENesin, ipratropium-albuteroL, nitroglycerin, oxygen, sodium chloride

## 2025-06-16 NOTE — PROGRESS NOTES
Maci Catalan is a 89 y.o. female       Sitting up in bed  Remains on oxygen  Tells me she is feeling much improved     Concerned about her COPD and the need for oxygen and getting short of breath on minimal exertion    Review of Systems     Constitutional: no fever, no chills, not feeling poorly, not feeling tired   Cardiovascular: no chest pain   Respiratory: Wheezing  Gastrointestinal: no abdominal pain, no constipation, no melena, no nausea, no diarrhea, no vomiting and no blood in stools.   Neurological: no headache,   All other systems have been reviewed and are negative for complaint.       Vitals:    06/16/25 1107   BP: 126/90   Pulse:    Resp: 15   Temp: 37 °C (98.6 °F)   SpO2: 95%        Scheduled medications  Scheduled Medications[1]  Continuous medications  Continuous Medications[2]  PRN medications  PRN Medications[3]    Lab Review   Results from last 7 days   Lab Units 06/15/25  0606 06/14/25  0505 06/13/25  0506   WBC AUTO x10*3/uL 9.8 9.4 9.8   HEMOGLOBIN g/dL 7.9* 8.4* 8.3*   HEMATOCRIT % 25.4* 26.0* 28.0*   PLATELETS AUTO x10*3/uL 201 178 185     Results from last 7 days   Lab Units 06/16/25  0524 06/15/25  0606 06/14/25  0505 06/13/25  0506 06/11/25  0437 06/10/25  1009   SODIUM mmol/L 138 139 139 137   < > 139   POTASSIUM mmol/L 3.8 3.8 3.6 3.7   < > 4.0   CHLORIDE mmol/L 107 108* 108* 106   < > 106   CO2 mmol/L 20* 23 20* 21   < > 20*   BUN mg/dL 38* 41* 41* 45*   < > 34*   CREATININE mg/dL 2.43* 2.35* 2.43* 2.51*   < > 2.62*   CALCIUM mg/dL 8.1* 7.7* 7.9* 8.1*   < > 7.5*   PROTEIN TOTAL g/dL  --   --   --  5.9*  5.9*  --  6.4   BILIRUBIN TOTAL mg/dL  --   --   --  0.3  --  0.3   ALK PHOS U/L  --   --   --  111  --  157*   ALT U/L  --   --   --  12  --  10   AST U/L  --   --   --  13  --  13   GLUCOSE mg/dL 284* 120* 199* 141*   < > 192*    < > = values in this interval not displayed.     Results from last 7 days   Lab Units 06/11/25  1458 06/11/25  1010 06/11/25  0437   TROPHS ng/L 166*  196* 245*        US abdomen limited liver   Final Result   Simple cyst in the left hepatic lobe corresponding to the hypodensity   noted on CT and measures 1.1 cm.        Cirrhotic liver morphology.        Status post cholecystectomy.        MACRO:   None.        Signed by: Dillon Miguel 6/15/2025 2:02 PM   Dictation workstation:   [id number]      CT chest wo IV contrast   Final Result   Moderate emphysema with central bronchial wall thickening. Small   patchy/nodular infiltrates in both lung bases. Follow-up to ensure   resolution after appropriate treatment recommended.        Distal esophageal wall thickening. Correlate with possible   esophagitis.        Enlarged lymph node in the precarinal space. Attention at follow-up   recommended.        Small pericardial effusion.        Incidental findings in the upper abdomen including air in the biliary   and pancreatic ductal systems and probable sizable pancreatic duct   calculi.        Other findings as described above.        MACRO:   None.        Signed by: Irene Javed 6/12/2025 3:09 PM   Dictation workstation:   [id number]      US renal complete   Final Result   Normal size kidneys without hydronephrosis.        Right renal cysts.        Echoes/debris in the urinary bladder. Correlation with urinalysis may   be helpful.        MACRO:   None.        Signed by: Irene Javed 6/12/2025 1:31 PM   Dictation workstation:   [id number]      Transthoracic Echo Complete   Final Result      XR chest 1 view   Final Result   Stable cardiac pacemaker on the left.        Findings of underlying COPD and emphysema.        No significant or acute interval change.        MACRO:   None        Signed by: Dillon Catalan 6/10/2025 11:33 AM   Dictation workstation:   [id number]            Physical Exam    Constitutional   General appearance: Alert and in no acute distress.   Pulmonary   Respiratory assessment: On nasal cannula  Auscultation of Lungs: Clear to  auscultation  Cardiovascular   Auscultation of heart: Apical pulse normal, heart rate and rhythm normal, normal S1 and S2, no murmurs and no pericardial rub.    Exam for edema: No peripheral edema.   Abdomen   Abdominal Exam: No bruits, normal bowel sounds, soft, non-tender, no abdominal mass palpated.    Liver and Spleen exam: No hepato-splenomegaly.   Musculoskeletal     Inspection/palpation of joints, bones and muscles: No joint swelling. Normal movement of all extremities.   Neurologic   Cranial nerves: Nerves 2-12 were intact, no focal neuro defects.         Assessment/Plan      #Exacerbation of COPD  #Acute kidney injury  #Hypertension  #Esophagitis  #Anemia of chronic disease      Pulmn following input noted  Cont with steroids and antibiotics  Wean oxygen as able, may be needed at DC >> desat study in am    Continue PPI  DuoNebs   Would benefit from outpatient pulmonary rehab for her COPD  Kidney functions are improving     Gi following  For liver US, pending    -Continue current treatment as ordered. Will make adjustments as necessary.    VTE Prophylaxis  -See Orders    Poss dc tomorrow    Plan of care discussed with: Provider, RN, Patient    Patient case and plan of care discussed with Dr. JACQUELYN Hendrix.    GLENIS Norris - CNP  -In collaboration with Dr. JACQUELYN Hendrix    [facility]  Office: [phone]  Fax: [phone]  Pt seen this evening , naun vyas and NP   She is feeling better  No pain   No nausea vomiting   Labs hba1c not done  it was ordered during this amdission   Pt is on solumedrol   Glucose noted to be high   Cont with sliding scale   Will need to stop solumedrol  Cotnw with breathign treatments   Pulm following for emphysema  Will need home oxy assessement   CKD will need out pt follow p   Dc plan home tomorrow if ok with consults  Jacky Hendrix MD    .            [1] cholecalciferol, 50 mcg, oral, Daily  epoetin fady or biosimilar, 100 Units/kg  (Ideal), subcutaneous, Weekly  ferrous sulfate, 65 mg of elemental iron, oral, Daily with breakfast  heparin (porcine), 5,000 Units, subcutaneous, q8h LISA  insulin lispro, 0-5 Units, subcutaneous, Before meals & nightly  ipratropium-albuteroL, 3 mL, nebulization, TID  [Held by provider] losartan, 50 mg, oral, Daily  melatonin, 9 mg, oral, Nightly  methylPREDNISolone sodium succinate (PF), 40 mg, intravenous, q24h  metoprolol succinate XL, 25 mg, oral, Daily  pantoprazole, 40 mg, oral, Daily before breakfast  piperacillin-tazobactam, 2.25 g, intravenous, q8h  polyethylene glycol, 17 g, oral, Daily  sodium bicarbonate, 650 mg, oral, BID  sucralfate, 1 g, oral, Before meals & nightly     [2]    [3] PRN medications: acetaminophen **OR** acetaminophen **OR** acetaminophen, guaiFENesin, ipratropium-albuteroL, nitroglycerin, oxygen, sodium chloride

## 2025-06-16 NOTE — PROGRESS NOTES
Physical Therapy    Physical Therapy Treatment    Patient Name: Maci Catalan  MRN: [id number]  Department: [place] [address]  Room: [address] [place] [address]  Today's Date: 6/16/2025  Time Calculation  Start Time: 1426  Stop Time: 1452  Time Calculation (min): 26 min         Assessment/Plan   PT Assessment  Barriers to Participation: Comorbidities  End of Session Communication: Bedside nurse  Assessment Comment: Pt is progressing toward functional transfer and ambulation goals. Demo  End of Session Patient Position: Up in chair, Alarm on  PT Plan  Inpatient/Swing Bed or Outpatient: Inpatient  PT Plan  Treatment/Interventions: Bed mobility, Transfer training, Gait training, Therapeutic exercise  PT Plan: Ongoing PT  PT Frequency: 3 times per week  PT Discharge Recommendations: Low intensity level of continued care  PT Recommended Transfer Status: Assist x1  PT - OK to Discharge: Yes (Per PT POC.)    PT Visit Info:  PT Received On: 06/16/25     General Visit Information:   General  Reason for Referral: 89 y.o. female presenting with Elevated troponin, SOB, and flu-like symptoms. In the ED, she was found to be hypoxic to 90% on room air.  Was placed on nasal cannula.  She was noted to have severe hypertension with a blood pressure of 245/96 mmHg.  Referred By: KAYDEN Gonzales  Prior to Session Communication: Bedside nurse  Patient Position Received: Bed, 3 rail up, Alarm on  Preferred Learning Style: auditory, kinesthetic  General Comment: Pt supine in bed upon arrival, agreeable to PT session.    Subjective   Precautions:  Precautions  LE Weight Bearing Status: Weight Bearing as Tolerated  Medical Precautions: Fall precautions     Date/Time Vitals Session Patient Position Pulse Resp SpO2 BP MAP (mmHg)    06/16/25 1426 During PT  --  98  --  94 %  --  --                 Objective   Pain:  Pain Assessment  Pain Assessment: 0-10  0-10 (Numeric) Pain Score: 0 - No pain  Cognition:  Cognition  Orientation Level: Oriented X4      Treatments:  Therapeutic Exercise  Therapeutic Exercise Performed: Yes  Therapeutic Exercise Activity 1: Pt instructed in seayted ther exs x10 reps B LE: heel/toe raises, LAQ, marches, and abd/add.    Bed Mobility  Bed Mobility: Yes  Bed Mobility 1  Bed Mobility 1: Supine to sitting  Level of Assistance 1: Distant supervision  Bed Mobility Comments 1: HOB elevated, close-dist Supervision for safety.    Ambulation/Gait Training  Ambulation/Gait Training Performed: Yes  Ambulation/Gait Training 1  Surface 1: Level tile  Device 1: Rolling walker  Gait Support Devices: Gait belt  Assistance 1: Contact guard, Close supervision  Quality of Gait 1: Forward flexed posture, Narrow base of support  Comments/Distance (ft) 1: 35ft x2 (Seated rest break between trial to manage fatigue and SOB. Pt receptive to Vc's on upright posture.)  Transfers  Transfer: Yes  Transfer 1  Transfer From 1: Bed to  Transfer to 1: Chair with arms, Chair without arms  Technique 1: Sit to stand, Stand to sit  Transfer Device 1: Walker  Transfer Level of Assistance 1: Close supervision  Trials/Comments 1: Completed from EOB and lower chair w/o arms. VC's req for proper hand placement.    Outcome Measures:  [facility] Basic Mobility  Turning from your back to your side while in a flat bed without using bedrails: A little  Moving from lying on your back to sitting on the side of a flat bed without using bedrails: A little  Moving to and from bed to chair (including a wheelchair): A little  Standing up from a chair using your arms (e.g. wheelchair or bedside chair): A little  To walk in hospital room: A little  Climbing 3-5 steps with railing: A little  Basic Mobility - Total Score: 18    Education Documentation  Mobility Training, taught by Maria Fernanda Ford PTA at 6/16/2025  3:18 PM.  Learner: Patient  Readiness: Acceptance  Method: Explanation  Response: Verbalizes Understanding    Education Comments  No comments found.        OP EDUCATION:       Encounter  Problems       Encounter Problems (Active)       Balance       complete all mobility with normal balance while dual tasking, negotiating in a dynamic environment, carrying items, etc., with proactive and reactive static and dynamic standing and sitting tasks, with mod I and LRAD, >15 minutes.         Start:  06/13/25    Expected End:  06/27/25               Mobility       STG - Patient will ambulate 150 ft mod I with LRAD and stable vitals.        Start:  06/13/25    Expected End:  06/27/25               PT Transfers       STG - Patient will perform bed mobility independently.        Start:  06/13/25    Expected End:  06/27/25            STG - Patient will transfer sit to and from stand mod I with LRAD.        Start:  06/13/25    Expected End:  06/27/25               Pain - Adult

## 2025-06-16 NOTE — PROGRESS NOTES
Maci Catalan is a 89 y.o. female on day 5 of admission presenting with Elevated troponin.    Plan: continues treatment for COPD/MOOK, receiving IV abx and steroids. Nephrology following. Will discuss HHC to see if patient would be interested in therapy at home. Will need home O2 eval prior to DC as well.  Disposition: Home Alone/ Healthy at Home/   Barrier: MOOK, wean o2,   ADOD:  1-2 days       06/16/25 0754   Discharge Planning   Home or Post Acute Services In home services   Type of Home Care Services Home OT;Home PT   Expected Discharge Disposition Home H   Does the patient need discharge transport arranged? Yes   RoundTrip coordination needed? Yes   Has discharge transport been arranged? No   What day is the transport expected? 06/18/25   Patient Choice   Provider Choice list and CMS website (https://medicare.gov/care-compare#search) for post-acute Quality and Resource Measure Data were provided and reviewed with: Patient   Patient / Family choosing to utilize agency / facility established prior to hospitalization Yes   Intensity of Service   Intensity of Service >30 min       Michelle Obrien RN

## 2025-06-16 NOTE — PROGRESS NOTES
"Maci Catalan is a 89 y.o. female on day 5 of admission presenting with Elevated troponin.  Patient with h/o HTN, DLP, CAD s/p PCI, HFrED, AAA s/p EVAR in 2019, complete heart block s/p PPM, PAD, CKD, T2DM, hiatal hernia, esophagitis, GI bleed due to PUD, pancreatic cancer s/p Whipple procedure, who p/w cough and URI symptoms x 1 week. In the ED found to be hypoxic requiring supplemental O2. Work up also revealed MOOK on CKD, very high BP consistent with hypertensive urgency/emergency, elevated troponin and concern for emphysema on chest imaging. Admitted to [facility] for NSTEMI, MOOK on CK and blood pressure control. Pulmonary is consulted for COPD.     Subjective   No acute events overnight.  Patient sitting up in bed watching TV reports that she is feeling significantly better than on admission.  Denies shortness of breath, pain, fever, chills and nausea.  Reports an occasional cough with no sputum.  Reports she can \"feel it rattling in there\".  Remained stable on 1 L NC.  Discussed weaning to room air in the morning.  Ordering I-S and Acapella.    Objective     Physical Exam  Constitutional:   Generally well-appearing, NAD, cooperative  HENT: Atraumatic, moist mucous membranes  Eyes: EOMI  Neck: Supple, no obvious JVD  Cardiovascular: Regular rate and rhythm, no murmur appreciated  Pulmonary: Fair air entry with posterior bibasilar mild crackles, otherwise clear and without wheezing; no conversational dyspnea noted  Abdominal: Soft, nontender, nondistended, + BS  Musculoskeletal: Fair active range of motion with fair strength  Extremities:   No BLE edema  Lymphadenopathy: mildly enlarged  left submental lymph node (non-tender)  Skin: Warm and dry  Neurological: Alert and oriented with clear and appropriate speech  Psychiatric:     Appropriate mood and behavior    Scheduled medications  cholecalciferol, 50 mcg, oral, Daily  epoetin fady or biosimilar, 100 Units/kg (Ideal), subcutaneous, Weekly  ferrous sulfate, 65 mg " of elemental iron, oral, Daily with breakfast  heparin (porcine), 5,000 Units, subcutaneous, q8h LISA  insulin lispro, 0-5 Units, subcutaneous, Before meals & nightly  ipratropium-albuteroL, 3 mL, nebulization, TID  [Held by provider] losartan, 50 mg, oral, Daily  melatonin, 9 mg, oral, Nightly  methylPREDNISolone sodium succinate (PF), 40 mg, intravenous, q24h  metoprolol succinate XL, 25 mg, oral, Daily  pantoprazole, 40 mg, oral, Daily before breakfast  piperacillin-tazobactam, 2.25 g, intravenous, q8h  polyethylene glycol, 17 g, oral, Daily  sodium bicarbonate, 650 mg, oral, BID  sucralfate, 1 g, oral, Before meals & nightly    Continuous medications     PRN medications  PRN medications: acetaminophen **OR** acetaminophen **OR** acetaminophen, guaiFENesin, ipratropium-albuteroL, nitroglycerin, oxygen, sodium chloride   Last Recorded Vitals  Blood pressure 126/90, pulse 78, temperature 37 °C (98.6 °F), temperature source Temporal, resp. rate 15, height 1.524 m (5'), weight (!) 43.1 kg (95 lb), SpO2 95%.  Intake/Output last 3 Shifts:  I/O last 3 completed shifts:  In: 2050 (47.6 mL/kg) [P.O.:1800; IV Piggyback:250]  Out: - (0 mL/kg)   Weight: 43.1 kg     Relevant Results  Results for orders placed or performed during the hospital encounter of 06/10/25 (from the past 24 hours)   POCT GLUCOSE   Result Value Ref Range    POCT Glucose 221 (H) 74 - 99 mg/dL   POCT GLUCOSE   Result Value Ref Range    POCT Glucose 352 (H) 74 - 99 mg/dL   Basic Metabolic Panel   Result Value Ref Range    Glucose 284 (H) 74 - 99 mg/dL    Sodium 138 136 - 145 mmol/L    Potassium 3.8 3.5 - 5.3 mmol/L    Chloride 107 98 - 107 mmol/L    Bicarbonate 20 (L) 21 - 32 mmol/L    Anion Gap 15 10 - 20 mmol/L    Urea Nitrogen 38 (H) 6 - 23 mg/dL    Creatinine 2.43 (H) 0.50 - 1.05 mg/dL    eGFR 19 (L) >60 mL/min/1.73m*2    Calcium 8.1 (L) 8.6 - 10.3 mg/dL   POCT GLUCOSE   Result Value Ref Range    POCT Glucose 254 (H) 74 - 99 mg/dL   POCT GLUCOSE    Result Value Ref Range    POCT Glucose 233 (H) 74 - 99 mg/dL   No results found.    Assessment & Plan  Elevated troponin    Benign essential HTN    3-vessel coronary artery disease    Chronic systolic congestive heart failure    Diabetes mellitus type 2, noninsulin dependent (Multi)    Acute bronchitis, unspecified organism    Hyperglycemia    89 YOF with h/o HTN, DLP, CAD s/p PCI, HFrEF, AAA s/p EVAR in 2019, complete heart block s/p PPM, PAD, CKD, T2DM, hiatal hernia, esophagitis, GI bleed due to PUD, pancreatic cancer s/p Whipple procedure, who p/w cough and URI symptoms x 1 week. In the ED found to be hypoxic requiring supplemental O2. Work up also revealed MOOK on CKD, very high BP consistent with hypertensive urgency/emergency, elevated troponin and concern for emphysema on chest imaging. Admitted to [facility] for NSTEMI, MOOK on CK and blood pressure control. Pulmonary is consulted for COPD.      Respiratory failure: acute with hypoxia, likely due to HFrEF and also hypertensive urgency/emergency.  Baseline was room air, 90% on room air placed on 3L supplemental O2 on admission; O2 need currently minimal.        Continue supplemental O2, wean off as tolerates       Home O2 evaluation before DC       BPH with IS, Acapella -ordered       Management of the individual causes as below.      Emphysema/COPD: states she was told she had emphysema many years ago, not on any inhaler at home. Baseline functional status OK, no wheezing. CT moderate emphysema.        Continue Duo-Neb       PFT as outpatient       Agreed with discharging on rescue inhaler.        Pulmonary referral after DC     HFmrEF: EF 45%.        Volume status optimization as per primary team and cardiology       Currently seems euvolemic on my exam     Pulmonary HTN: very mild, likely group 2. RV size and systolic function normal.        CHF management as above.      DVT prophylaxis: currently on SCD and subcutaneous heparin.      Pulmonary will FU while in  [place].     I spent 35 minutes in the professional and overall care of this patient.   Yessy Shetty, APRN-CNS

## 2025-06-17 ENCOUNTER — DOCUMENTATION (OUTPATIENT)
Dept: HOME HEALTH SERVICES | Facility: HOME HEALTH | Age: OVER 89
End: 2025-06-17
Payer: MEDICARE

## 2025-06-17 ENCOUNTER — HOME HEALTH ADMISSION (OUTPATIENT)
Dept: HOME HEALTH SERVICES | Facility: HOME HEALTH | Age: OVER 89
End: 2025-06-17
Payer: MEDICARE

## 2025-06-17 ENCOUNTER — PHARMACY VISIT (OUTPATIENT)
Dept: PHARMACY | Facility: CLINIC | Age: OVER 89
End: 2025-06-17
Payer: COMMERCIAL

## 2025-06-17 VITALS
RESPIRATION RATE: 16 BRPM | WEIGHT: 95 LBS | HEART RATE: 87 BPM | TEMPERATURE: 97.9 F | OXYGEN SATURATION: 92 % | BODY MASS INDEX: 18.65 KG/M2 | SYSTOLIC BLOOD PRESSURE: 161 MMHG | DIASTOLIC BLOOD PRESSURE: 81 MMHG | HEIGHT: 60 IN

## 2025-06-17 LAB
EST. AVERAGE GLUCOSE BLD GHB EST-MCNC: 223 MG/DL
GLUCOSE BLD MANUAL STRIP-MCNC: 119 MG/DL (ref 74–99)
GLUCOSE BLD MANUAL STRIP-MCNC: 277 MG/DL (ref 74–99)
GLUCOSE BLD MANUAL STRIP-MCNC: 295 MG/DL (ref 74–99)
HBA1C MFR BLD: 9.4 % (ref ?–5.7)
HOLD SPECIMEN: NORMAL

## 2025-06-17 PROCEDURE — 94761 N-INVAS EAR/PLS OXIMETRY MLT: CPT

## 2025-06-17 PROCEDURE — 94640 AIRWAY INHALATION TREATMENT: CPT

## 2025-06-17 PROCEDURE — 2500000005 HC RX 250 GENERAL PHARMACY W/O HCPCS: Performed by: FAMILY MEDICINE

## 2025-06-17 PROCEDURE — 2500000002 HC RX 250 W HCPCS SELF ADMINISTERED DRUGS (ALT 637 FOR MEDICARE OP, ALT 636 FOR OP/ED): Performed by: NURSE PRACTITIONER

## 2025-06-17 PROCEDURE — RXMED WILLOW AMBULATORY MEDICATION CHARGE

## 2025-06-17 PROCEDURE — 2500000001 HC RX 250 WO HCPCS SELF ADMINISTERED DRUGS (ALT 637 FOR MEDICARE OP): Performed by: INTERNAL MEDICINE

## 2025-06-17 PROCEDURE — 2500000004 HC RX 250 GENERAL PHARMACY W/ HCPCS (ALT 636 FOR OP/ED): Performed by: INTERNAL MEDICINE

## 2025-06-17 PROCEDURE — 2500000001 HC RX 250 WO HCPCS SELF ADMINISTERED DRUGS (ALT 637 FOR MEDICARE OP): Performed by: NURSE PRACTITIONER

## 2025-06-17 PROCEDURE — 2500000004 HC RX 250 GENERAL PHARMACY W/ HCPCS (ALT 636 FOR OP/ED): Performed by: NURSE PRACTITIONER

## 2025-06-17 PROCEDURE — 94668 MNPJ CHEST WALL SBSQ: CPT

## 2025-06-17 PROCEDURE — 2500000002 HC RX 250 W HCPCS SELF ADMINISTERED DRUGS (ALT 637 FOR MEDICARE OP, ALT 636 FOR OP/ED): Performed by: FAMILY MEDICINE

## 2025-06-17 PROCEDURE — 82947 ASSAY GLUCOSE BLOOD QUANT: CPT

## 2025-06-17 RX ORDER — SODIUM BICARBONATE 650 MG/1
650 TABLET ORAL 2 TIMES DAILY
Qty: 60 TABLET | Refills: 0 | Status: SHIPPED | OUTPATIENT
Start: 2025-06-17 | End: 2025-07-17

## 2025-06-17 RX ORDER — AMOXICILLIN AND CLAVULANATE POTASSIUM 500; 125 MG/1; MG/1
1 TABLET, FILM COATED ORAL 2 TIMES DAILY
Qty: 6 TABLET | Refills: 0 | Status: SHIPPED | OUTPATIENT
Start: 2025-06-17 | End: 2025-06-20

## 2025-06-17 RX ORDER — PANTOPRAZOLE SODIUM 40 MG/1
40 TABLET, DELAYED RELEASE ORAL
Qty: 30 TABLET | Refills: 0 | Status: SHIPPED | OUTPATIENT
Start: 2025-06-17

## 2025-06-17 RX ORDER — FLUTICASONE FUROATE, UMECLIDINIUM BROMIDE AND VILANTEROL TRIFENATATE 100; 62.5; 25 UG/1; UG/1; UG/1
1 POWDER RESPIRATORY (INHALATION) DAILY
Qty: 60 EACH | Refills: 0 | Status: SHIPPED | OUTPATIENT
Start: 2025-06-17 | End: 2025-07-17

## 2025-06-17 RX ORDER — FERROUS SULFATE 325(65) MG
65 TABLET ORAL
Qty: 30 TABLET | Refills: 0 | Status: SHIPPED | OUTPATIENT
Start: 2025-06-18 | End: 2025-07-18

## 2025-06-17 RX ADMIN — IPRATROPIUM BROMIDE AND ALBUTEROL SULFATE 3 ML: 2.5; .5 SOLUTION RESPIRATORY (INHALATION) at 07:52

## 2025-06-17 RX ADMIN — SUCRALFATE 1 G: 1 TABLET ORAL at 15:58

## 2025-06-17 RX ADMIN — METOPROLOL SUCCINATE 25 MG: 25 TABLET, EXTENDED RELEASE ORAL at 08:27

## 2025-06-17 RX ADMIN — POLYETHYLENE GLYCOL 3350 17 G: 17 POWDER, FOR SOLUTION ORAL at 08:27

## 2025-06-17 RX ADMIN — PIPERACILLIN SODIUM AND TAZOBACTAM SODIUM 2.25 G: 2; .25 INJECTION, SOLUTION INTRAVENOUS at 14:06

## 2025-06-17 RX ADMIN — HEPARIN SODIUM 5000 UNITS: 5000 INJECTION, SOLUTION INTRAVENOUS; SUBCUTANEOUS at 06:41

## 2025-06-17 RX ADMIN — SUCRALFATE 1 G: 1 TABLET ORAL at 06:40

## 2025-06-17 RX ADMIN — SODIUM BICARBONATE 650 MG: 650 TABLET ORAL at 08:27

## 2025-06-17 RX ADMIN — SITAGLIPTIN 25 MG: 25 TABLET, FILM COATED ORAL at 16:01

## 2025-06-17 RX ADMIN — INSULIN LISPRO 3 UNITS: 100 INJECTION, SOLUTION INTRAVENOUS; SUBCUTANEOUS at 11:32

## 2025-06-17 RX ADMIN — Medication 3 L/MIN: at 07:56

## 2025-06-17 RX ADMIN — PIPERACILLIN SODIUM AND TAZOBACTAM SODIUM 2.25 G: 2; .25 INJECTION, SOLUTION INTRAVENOUS at 06:40

## 2025-06-17 RX ADMIN — PANTOPRAZOLE SODIUM 40 MG: 40 TABLET, DELAYED RELEASE ORAL at 06:41

## 2025-06-17 RX ADMIN — INSULIN LISPRO 3 UNITS: 100 INJECTION, SOLUTION INTRAVENOUS; SUBCUTANEOUS at 07:32

## 2025-06-17 RX ADMIN — SUCRALFATE 1 G: 1 TABLET ORAL at 11:02

## 2025-06-17 RX ADMIN — IPRATROPIUM BROMIDE AND ALBUTEROL SULFATE 3 ML: 2.5; .5 SOLUTION RESPIRATORY (INHALATION) at 14:41

## 2025-06-17 RX ADMIN — Medication 50 MCG: at 08:26

## 2025-06-17 RX ADMIN — FERROUS SULFATE TAB 325 MG (65 MG ELEMENTAL FE) 1 TABLET: 325 (65 FE) TAB at 08:26

## 2025-06-17 ASSESSMENT — COGNITIVE AND FUNCTIONAL STATUS - GENERAL
MOBILITY SCORE: 24
DAILY ACTIVITIY SCORE: 24

## 2025-06-17 ASSESSMENT — PAIN SCALES - GENERAL: PAINLEVEL_OUTOF10: 0 - NO PAIN

## 2025-06-17 NOTE — NURSING NOTE
PATIENT EDUCATED ON CHECKING HER BLOOD SUGAR AT HOME. VERBALIZED UNDERSTANDING. ALSO EDUCATED ON FREESTYLE ALISE THAT SHE WILL  AT DR. Shrestha OFFICE WHEN SHE GOES FOR APPOINTMENT IN ONE WEEK. PATIENT VERBALIZED UNDERSTANDING

## 2025-06-17 NOTE — NURSING NOTE

## 2025-06-17 NOTE — HH CARE COORDINATION
Home Care received a Referral for Nursing, Physical Therapy, Occupational Therapy, and Home Health Aide. We have processed the referral for a Start of Care on 6.19.2025.     If you have any questions or concerns, please feel free to contact us at [phone]. Follow the prompts, enter your five digit zip code, and you will be directed to your care team on CENTL 1.

## 2025-06-17 NOTE — CARE PLAN
The patient's goals for the shift include      The clinical goals for the shift include PT WILL REMAIN SAFE   THIS SHIFT    Over the shift, the patient did not make progress toward the following goals. Barriers to progression include . Recommendations to address these barriers include ENCOURAGE REST.

## 2025-06-17 NOTE — PROGRESS NOTES
***Pre rounding note - This note prepared and pended in anticipation of rounding. The content, physical exam, assessment and plan are not finalized till note is signed as complete ***     Maci Catalan is a 89 y.o. female       Sitting up in bed  Remains on oxygen  Tells me she is feeling much improved     Concerned about her COPD and the need for oxygen and getting short of breath on minimal exertion    Review of Systems     Constitutional: no fever, no chills, not feeling poorly, not feeling tired   Cardiovascular: no chest pain   Respiratory: Wheezing  Gastrointestinal: no abdominal pain, no constipation, no melena, no nausea, no diarrhea, no vomiting and no blood in stools.   Neurological: no headache,   All other systems have been reviewed and are negative for complaint.       Vitals:    06/17/25 1113   BP: 138/67   Pulse: 79   Resp: 18   Temp: 36.7 °C (98.1 °F)   SpO2: 96%        Scheduled medications  Scheduled Medications[1]  Continuous medications  Continuous Medications[2]  PRN medications  PRN Medications[3]    Lab Review   Results from last 7 days   Lab Units 06/15/25  0606 06/14/25  0505 06/13/25  0506   WBC AUTO x10*3/uL 9.8 9.4 9.8   HEMOGLOBIN g/dL 7.9* 8.4* 8.3*   HEMATOCRIT % 25.4* 26.0* 28.0*   PLATELETS AUTO x10*3/uL 201 178 185     Results from last 7 days   Lab Units 06/16/25  0524 06/15/25  0606 06/14/25  0505 06/13/25  0506   SODIUM mmol/L 138 139 139 137   POTASSIUM mmol/L 3.8 3.8 3.6 3.7   CHLORIDE mmol/L 107 108* 108* 106   CO2 mmol/L 20* 23 20* 21   BUN mg/dL 38* 41* 41* 45*   CREATININE mg/dL 2.43* 2.35* 2.43* 2.51*   CALCIUM mg/dL 8.1* 7.7* 7.9* 8.1*   PROTEIN TOTAL g/dL  --   --   --  5.9*  5.9*   BILIRUBIN TOTAL mg/dL  --   --   --  0.3   ALK PHOS U/L  --   --   --  111   ALT U/L  --   --   --  12   AST U/L  --   --   --  13   GLUCOSE mg/dL 284* 120* 199* 141*     Results from last 7 days   Lab Units 06/11/25  1458 06/11/25  1010 06/11/25  0437   TROPHS ng/L 166* 196* 245*         US abdomen limited liver   Final Result   Simple cyst in the left hepatic lobe corresponding to the hypodensity   noted on CT and measures 1.1 cm.        Cirrhotic liver morphology.        Status post cholecystectomy.        MACRO:   None.        Signed by: Dillon Miguel 6/15/2025 2:02 PM   Dictation workstation:   [id number]      CT chest wo IV contrast   Final Result   Moderate emphysema with central bronchial wall thickening. Small   patchy/nodular infiltrates in both lung bases. Follow-up to ensure   resolution after appropriate treatment recommended.        Distal esophageal wall thickening. Correlate with possible   esophagitis.        Enlarged lymph node in the precarinal space. Attention at follow-up   recommended.        Small pericardial effusion.        Incidental findings in the upper abdomen including air in the biliary   and pancreatic ductal systems and probable sizable pancreatic duct   calculi.        Other findings as described above.        MACRO:   None.        Signed by: Irene Javed 6/12/2025 3:09 PM   Dictation workstation:   [id number]      US renal complete   Final Result   Normal size kidneys without hydronephrosis.        Right renal cysts.        Echoes/debris in the urinary bladder. Correlation with urinalysis may   be helpful.        MACRO:   None.        Signed by: Irene Javed 6/12/2025 1:31 PM   Dictation workstation:   [id number]      Transthoracic Echo Complete   Final Result      XR chest 1 view   Final Result   Stable cardiac pacemaker on the left.        Findings of underlying COPD and emphysema.        No significant or acute interval change.        MACRO:   None        Signed by: Dillon Catalan 6/10/2025 11:33 AM   Dictation workstation:   [id number]            Physical Exam    Constitutional   General appearance: Alert and in no acute distress.   Pulmonary   Respiratory assessment: On nasal cannula  Auscultation of Lungs: Clear to auscultation  Cardiovascular    Auscultation of heart: Apical pulse normal, heart rate and rhythm normal, normal S1 and S2, no murmurs and no pericardial rub.    Exam for edema: No peripheral edema.   Abdomen   Abdominal Exam: No bruits, normal bowel sounds, soft, non-tender, no abdominal mass palpated.    Liver and Spleen exam: No hepato-splenomegaly.   Musculoskeletal     Inspection/palpation of joints, bones and muscles: No joint swelling. Normal movement of all extremities.   Neurologic   Cranial nerves: Nerves 2-12 were intact, no focal neuro defects.         Assessment/Plan      #Exacerbation of COPD  #Acute kidney injury  #Hypertension  #Esophagitis  #Anemia of chronic disease      Pulmn following input noted  Cont with steroids and antibiotics  Wean oxygen as able, may be needed at DC >> desat study in am    Continue PPI  DuoNebs   Would benefit from outpatient pulmonary rehab for her COPD  Kidney functions are improving     Gi following  For liver US, pending    -Continue current treatment as ordered. Will make adjustments as necessary.    VTE Prophylaxis  -See Orders    Poss dc today    Plan of care discussed with: Provider, RN, Patient     Glucose noted to be high   Cont with sliding scale   Will need to stop solumedrol  Cotnw with breathign treatments   Pulm following for emphysema  Will need home oxy assessement   CKD will need out pt follow p   Dc plan home today  .       ##Transcribed for Dr. JACQUELYN Hendrix##    ***Pre rounding note - This note prepared and pended in anticipation of rounding. The content, physical exam, assessment and plan are not finalized till note is signed as complete ***           [1] cholecalciferol, 50 mcg, oral, Daily  epoetin fady or biosimilar, 100 Units/kg (Ideal), subcutaneous, Weekly  ferrous sulfate, 65 mg of elemental iron, oral, Daily with breakfast  heparin (porcine), 5,000 Units, subcutaneous, q8h LISA  insulin lispro, 0-5 Units, subcutaneous, Before meals & nightly  ipratropium-albuteroL, 3 mL,  nebulization, TID  [Held by provider] losartan, 50 mg, oral, Daily  melatonin, 9 mg, oral, Nightly  metoprolol succinate XL, 25 mg, oral, Daily  pantoprazole, 40 mg, oral, Daily before breakfast  piperacillin-tazobactam, 2.25 g, intravenous, q8h  polyethylene glycol, 17 g, oral, Daily  SITagliptin phosphate, 25 mg, oral, Daily  sodium bicarbonate, 650 mg, oral, BID  sucralfate, 1 g, oral, Before meals & nightly     [2]    [3] PRN medications: acetaminophen **OR** acetaminophen **OR** acetaminophen, guaiFENesin, ipratropium-albuteroL, nitroglycerin, oxygen, sodium chloride

## 2025-06-17 NOTE — DISCHARGE SUMMARY
Discharge Diagnosis  Elevated troponin           Issues Requiring Follow-Up  Pcp - hospital follow up, general health maintenance and medication refills.    Luis Enrique - renal     HTN and cardiac follow up - cards   Discharge Meds     Medication List      START taking these medications     amoxicillin-clavulanate 500-125 mg tablet; Commonly known as: Augmentin;   Take 1 tablet by mouth 2 times a day for 3 days.   ferrous sulfate 325 mg (65 mg elemental) tablet; Take 1 tablet by mouth   once daily with breakfast.; Start taking on: June 18, 2025   pantoprazole 40 mg EC tablet; Commonly known as: ProtoNix; Take 1 tablet   (40 mg) by mouth once daily in the morning. Take before meals. Do not   crush, chew, or split.   SITagliptin phosphate 25 mg tablet; Commonly known as: Januvia; Take 1   tablet (25 mg) by mouth once daily.   sodium bicarbonate 650 mg tablet; Take 1 tablet (650 mg) by mouth 2   times a day.   Trelegy Ellipta 100-62.5-25 mcg blister with device; Generic drug:   fluticasone-umeclidin-vilanter; Inhale 1 puff once daily.     CONTINUE taking these medications     acetaminophen 325 mg tablet; Commonly known as: Tylenol; Take 2 tablets   (650 mg) by mouth every 4 hours if needed for mild pain (1 - 3) or   moderate pain (4 - 6).   empagliflozin 10 mg tablet; Commonly known as: Jardiance; Take 1 tablet   (10 mg) by mouth once daily.   famotidine 20 mg tablet; Commonly known as: Pepcid   melatonin 10 mg tablet   metFORMIN 500 mg tablet; Commonly known as: Glucophage   metoprolol succinate XL 25 mg 24 hr tablet; Commonly known as:   Toprol-XL; Take 1 tablet (25 mg) by mouth once daily. Do not crush or   chew.   nitroglycerin 0.4 mg SL tablet; Commonly known as: Nitrostat   sucralfate 1 gram tablet; Commonly known as: Carafate     STOP taking these medications     furosemide 40 mg tablet; Commonly known as: Lasix   losartan 50 mg tablet; Commonly known as: Cozaar       Test Results Pending At Discharge  Pending Labs        Order Current Status    Serum Protein Electrophoresis + Immunofixation In process    Serum Protein Electrophoresis + Immunofixation In process            History Of Present Illness  Maci Catalan is a 89 y.o. female presenting with complaint of cough and congestion and sort throat for past few days. No fever no chest pain no SOB. Was brought to ER work up noted felt to have bronchitis. Labs noted for Her troponin is slowly uptrending. She is denying chest pain. EKG is nonischemic. She was started on a heparin infusion for NSTEMI. Pt admitted for further care .  Cardio signed off          Hospital Course     Assessment/Plan  #Exacerbation of COPD  #Acute kidney injury  #Hypertension  #Esophagitis  #Anemia of chronic disease        Pulmn following input noted  Cont with steroids and antibiotics  Wean oxygen as able, may be needed at DC >> desat study in am     Continue PPI  DuoNebs   Would benefit from outpatient pulmonary rehab for her COPD  Kidney functions are improving      Gi following  For liver US, showing liver cyst        For her DM she will follow up in office  No systemic steroids on dc    Dc plan home today    Pertinent Physical Exam At Time of Discharge  Physical Exam    Outpatient Follow-Up  Future Appointments   Date Time Provider Department Center   7/29/2025 11:40 AM DO FRANCISCO [id number] [facility]   11/24/2025  9:30 AM U [facility] 2 [facility]   11/24/2025 10:40 AM Heladio Higginbotham MD PhD [facility]         Ulises Wu, GLENIS-CNP  agree [facility] discharge summary , reviewd and dw NP  Jacky Hendrix MD

## 2025-06-17 NOTE — SIGNIFICANT EVENT
Pt assessed for need of home oxygen.  Pt's oxygen saturation on room air at rest was 96%.  Pt's oxygen saturation on room air while ambulating in pt's room was 92%.  Pt. Does not qualify for home oxygen.

## 2025-06-18 NOTE — PROGRESS NOTES
Maci Catalan is a 89 y.o. female       Sitting up in bed  Remains on oxygen  Tells me she is feeling much improved     Denies any chest apin  Pt seen this morning with the team    Review of Systems     Constitutional: no fever, no chills, not feeling poorly, not feeling tired   Cardiovascular: no chest pain   Respiratory: Wheezing  Gastrointestinal: no abdominal pain, no constipation, no melena, no nausea, no diarrhea, no vomiting and no blood in stools.   Neurological: no headache,   All other systems have been reviewed and are negative for complaint.       Vitals:    06/17/25 1604   BP: 161/81   Pulse: 87   Resp: 16   Temp: 36.6 °C (97.9 °F)   SpO2: 92%        Scheduled medications  Scheduled Medications[1]  Continuous medications  Continuous Medications[2]  PRN medications  PRN Medications[3]    Lab Review   Results from last 7 days   Lab Units 06/15/25  0606 06/14/25  0505 06/13/25  0506   WBC AUTO x10*3/uL 9.8 9.4 9.8   HEMOGLOBIN g/dL 7.9* 8.4* 8.3*   HEMATOCRIT % 25.4* 26.0* 28.0*   PLATELETS AUTO x10*3/uL 201 178 185     Results from last 7 days   Lab Units 06/16/25  0524 06/15/25  0606 06/14/25  0505 06/13/25  0506   SODIUM mmol/L 138 139 139 137   POTASSIUM mmol/L 3.8 3.8 3.6 3.7   CHLORIDE mmol/L 107 108* 108* 106   CO2 mmol/L 20* 23 20* 21   BUN mg/dL 38* 41* 41* 45*   CREATININE mg/dL 2.43* 2.35* 2.43* 2.51*   CALCIUM mg/dL 8.1* 7.7* 7.9* 8.1*   PROTEIN TOTAL g/dL  --   --   --  5.9*  5.9*   BILIRUBIN TOTAL mg/dL  --   --   --  0.3   ALK PHOS U/L  --   --   --  111   ALT U/L  --   --   --  12   AST U/L  --   --   --  13   GLUCOSE mg/dL 284* 120* 199* 141*     Results from last 7 days   Lab Units 06/11/25  1458 06/11/25  1010   TROPHS ng/L 166* 196*        US abdomen limited liver   Final Result   Simple cyst in the left hepatic lobe corresponding to the hypodensity   noted on CT and measures 1.1 cm.        Cirrhotic liver morphology.        Status post cholecystectomy.        MACRO:   None.         Signed by: Dillon Miguel 6/15/2025 2:02 PM   Dictation workstation:   [id number]      CT chest wo IV contrast   Final Result   Moderate emphysema with central bronchial wall thickening. Small   patchy/nodular infiltrates in both lung bases. Follow-up to ensure   resolution after appropriate treatment recommended.        Distal esophageal wall thickening. Correlate with possible   esophagitis.        Enlarged lymph node in the precarinal space. Attention at follow-up   recommended.        Small pericardial effusion.        Incidental findings in the upper abdomen including air in the biliary   and pancreatic ductal systems and probable sizable pancreatic duct   calculi.        Other findings as described above.        MACRO:   None.        Signed by: Irene Javed 6/12/2025 3:09 PM   Dictation workstation:   [id number]      US renal complete   Final Result   Normal size kidneys without hydronephrosis.        Right renal cysts.        Echoes/debris in the urinary bladder. Correlation with urinalysis may   be helpful.        MACRO:   None.        Signed by: Irene Javed 6/12/2025 1:31 PM   Dictation workstation:   [id number]      Transthoracic Echo Complete   Final Result      XR chest 1 view   Final Result   Stable cardiac pacemaker on the left.        Findings of underlying COPD and emphysema.        No significant or acute interval change.        MACRO:   None        Signed by: Dillon Catalan 6/10/2025 11:33 AM   Dictation workstation:   [id number]            Physical Exam    Constitutional   General appearance: Alert and in no acute distress.   Pulmonary   Respiratory assessment: On nasal cannula  Auscultation of Lungs: Clear to auscultation  Cardiovascular   Auscultation of heart: Apical pulse normal, heart rate and rhythm normal, normal S1 and S2, no murmurs and no pericardial rub.    Exam for edema: No peripheral edema.   Abdomen   Abdominal Exam: No bruits, normal bowel sounds, soft, non-tender, no  abdominal mass palpated.    Liver and Spleen exam: No hepato-splenomegaly.   Musculoskeletal     Inspection/palpation of joints, bones and muscles: No joint swelling. Normal movement of all extremities.   Neurologic   Cranial nerves: Nerves 2-12 were intact, no focal neuro defects.     IMPRESSION:  Simple cyst in the left hepatic lobe corresponding to the hypodensity  noted on CT and measures 1.1 cm.      Cirrhotic liver morphology.      Status post cholecystectomy.    Assessment/Plan      #Exacerbation of COPD  #Acute kidney injury  #Hypertension  #Esophagitis  #Anemia of chronic disease      Pulmn following input noted  Cont with steroids and antibiotics  Wean oxygen as able, may be needed at DC >> desat study ordered    Continue PPI  DuoNebs   Would benefit from outpatient pulmonary rehab for her COPD  Kidney functions are improving  Will start on trelegy on dc     Gi input noted  Follow up as outpt      -Continue current treatment as ordered. Will make adjustments as necessary.    VTE Prophylaxis  -See Orders    Time > 30 min in discharge planning  Home care    Plan of care discussed with: Provider, RN, Patient    Follow up with me in office in one week with glucose monitoring   Started on januvia   Diabetic diet  Jacky Hendrix MD    .            [1] [2] [3]

## 2025-06-19 LAB
ALBUMIN: 3.2 G/DL (ref 3.4–5)
ALPHA 1 GLOBULIN: 0.4 G/DL (ref 0.2–0.6)
ALPHA 2 GLOBULIN: 0.8 G/DL (ref 0.4–1.1)
BETA GLOBULIN: 0.7 G/DL (ref 0.5–1.2)
GAMMA GLOBULIN: 0.7 G/DL (ref 0.5–1.4)
IMMUNOFIXATION COMMENT: ABNORMAL
PATH REVIEW - SERUM IMMUNOFIXATION: ABNORMAL
PATH REVIEW-SERUM PROTEIN ELECTROPHORESIS: ABNORMAL
PROTEIN ELECTROPHORESIS COMMENT: ABNORMAL

## 2025-06-21 ENCOUNTER — HOME CARE VISIT (OUTPATIENT)
Dept: HOME HEALTH SERVICES | Facility: HOME HEALTH | Age: OVER 89
End: 2025-06-21
Payer: MEDICARE

## 2025-06-21 VITALS
SYSTOLIC BLOOD PRESSURE: 110 MMHG | BODY MASS INDEX: 18.26 KG/M2 | HEART RATE: 72 BPM | WEIGHT: 93 LBS | OXYGEN SATURATION: 95 % | DIASTOLIC BLOOD PRESSURE: 70 MMHG | RESPIRATION RATE: 20 BRPM | HEIGHT: 60 IN | TEMPERATURE: 97.9 F

## 2025-06-21 PROCEDURE — 169592 NO-PAY CLAIM PROCEDURE

## 2025-06-21 PROCEDURE — 0023 HH SOC

## 2025-06-21 PROCEDURE — G0299 HHS/HOSPICE OF RN EA 15 MIN: HCPCS | Mod: HHH

## 2025-06-21 PROCEDURE — 1090000001 HH PPS REVENUE CREDIT

## 2025-06-21 PROCEDURE — 1090000002 HH PPS REVENUE DEBIT

## 2025-06-22 PROCEDURE — 1090000002 HH PPS REVENUE DEBIT

## 2025-06-22 PROCEDURE — 1090000001 HH PPS REVENUE CREDIT

## 2025-06-22 ASSESSMENT — ACTIVITIES OF DAILY LIVING (ADL)
AMBULATION ASSISTANCE: STAND BY ASSIST
CURRENT_FUNCTION: STAND BY ASSIST
OASIS_M1830: 02
ENTERING_EXITING_HOME: NEEDS ASSISTANCE

## 2025-06-22 ASSESSMENT — ENCOUNTER SYMPTOMS
PERSON REPORTING PAIN: PATIENT
MUSCLE WEAKNESS: 1
FATIGUES EASILY: 1
APPETITE LEVEL: FAIR
LAST BOWEL MOVEMENT: 67377
DENIES PAIN: 1

## 2025-06-23 ENCOUNTER — HOME CARE VISIT (OUTPATIENT)
Dept: HOME HEALTH SERVICES | Facility: HOME HEALTH | Age: OVER 89
End: 2025-06-23
Payer: MEDICARE

## 2025-06-23 LAB
ATRIAL RATE: 110 BPM
ATRIAL RATE: 95 BPM
P AXIS: 74 DEGREES
P AXIS: 74 DEGREES
P OFFSET: 164 MS
P OFFSET: 167 MS
P ONSET: 112 MS
P ONSET: 123 MS
PR INTERVAL: 142 MS
PR INTERVAL: 158 MS
Q ONSET: 191 MS
Q ONSET: 194 MS
QRS COUNT: 15 BEATS
QRS COUNT: 18 BEATS
QRS DURATION: 140 MS
QRS DURATION: 154 MS
QT INTERVAL: 370 MS
QT INTERVAL: 426 MS
QTC CALCULATION(BAZETT): 500 MS
QTC CALCULATION(BAZETT): 535 MS
QTC FREDERICIA: 453 MS
QTC FREDERICIA: 496 MS
R AXIS: 4 DEGREES
R AXIS: 54 DEGREES
T AXIS: 106 DEGREES
T AXIS: 189 DEGREES
T OFFSET: 379 MS
T OFFSET: 404 MS
VENTRICULAR RATE: 110 BPM
VENTRICULAR RATE: 95 BPM

## 2025-06-23 PROCEDURE — 1090000002 HH PPS REVENUE DEBIT

## 2025-06-23 PROCEDURE — G0156 HHCP-SVS OF AIDE,EA 15 MIN: HCPCS | Mod: HHH

## 2025-06-23 PROCEDURE — 1090000001 HH PPS REVENUE CREDIT

## 2025-06-24 PROCEDURE — 1090000001 HH PPS REVENUE CREDIT

## 2025-06-24 PROCEDURE — 1090000002 HH PPS REVENUE DEBIT

## 2025-06-25 ENCOUNTER — HOME CARE VISIT (OUTPATIENT)
Dept: HOME HEALTH SERVICES | Facility: HOME HEALTH | Age: OVER 89
End: 2025-06-25
Payer: MEDICARE

## 2025-06-25 VITALS
HEART RATE: 66 BPM | DIASTOLIC BLOOD PRESSURE: 60 MMHG | TEMPERATURE: 98.2 F | SYSTOLIC BLOOD PRESSURE: 108 MMHG | RESPIRATION RATE: 18 BRPM

## 2025-06-25 VITALS
HEART RATE: 68 BPM | OXYGEN SATURATION: 99 % | SYSTOLIC BLOOD PRESSURE: 120 MMHG | TEMPERATURE: 97.5 F | DIASTOLIC BLOOD PRESSURE: 70 MMHG | RESPIRATION RATE: 16 BRPM

## 2025-06-25 PROCEDURE — 1090000002 HH PPS REVENUE DEBIT

## 2025-06-25 PROCEDURE — G0151 HHCP-SERV OF PT,EA 15 MIN: HCPCS | Mod: HHH

## 2025-06-25 PROCEDURE — G0299 HHS/HOSPICE OF RN EA 15 MIN: HCPCS | Mod: HHH

## 2025-06-25 PROCEDURE — 1090000001 HH PPS REVENUE CREDIT

## 2025-06-25 ASSESSMENT — GAIT ASSESSMENTS
STEP SYMMETRY: 1 - RIGHT AND LEFT STEP LENGTH APPEAR EQUAL
PATH SCORE: 1
TRUNK: 1 - NO SWAY BUT FLEXION OF KNEES OR BACK OR SPREADS ARMS WHILE WALKING
BALANCE AND GAIT SCORE: 18
TRUNK SCORE: 1
PATH: 1 - MILD/MODERATE DEVIATION OR USES WALKING AID
GAIT SCORE: 7
STEP CONTINUITY: 1 - STEPS APPEAR CONTINUOUS
INITIATION OF GAIT IMMEDIATELY AFTER GO: 0 - ANY HESITANCY OR MULTIPLE ATTEMPTS TO START
WALKING STANCE: 1 - HEELS ALMOST TOUCHING WHILE WALKING

## 2025-06-25 ASSESSMENT — ACTIVITIES OF DAILY LIVING (ADL)
AMBULATION ASSISTANCE ON FLAT SURFACES: 1
OASIS_M1830: 03

## 2025-06-25 ASSESSMENT — ENCOUNTER SYMPTOMS
MUSCLE WEAKNESS: 1
DENIES PAIN: 1
DENIES PAIN: 1
PERSON REPORTING PAIN: PATIENT
APPETITE LEVEL: FAIR
CHANGE IN APPETITE: UNCHANGED
DRY SKIN: 1
PERSON REPORTING PAIN: PATIENT

## 2025-06-25 ASSESSMENT — BALANCE ASSESSMENTS
SITTING DOWN: 1 - USES ARMS OR NOT SMOOTH MOTION
NUDGED SCORE: 2
EYES CLOSED AT MAXIMUM POSITION NUDGED: 1 - STEADY
ARISING SCORE: 1
TURNING 360 DEGREES STEPS: 0 - DISCONTINUOUS STEPS
IMMEDIATE STANDING BALANCE FIRST 5 SECONDS: 1 - STEADY BUT USES WALKER OR OTHER SUPPORT
SITTING BALANCE: 1 - STEADY, SAFE
ATTEMPTS TO ARISE: 2 - ABLE TO RISE, ONE ATTEMPT
STANDING BALANCE: 2 - NARROW STANCE WITHOUT SUPPORT
NUDGED: 2 - STEADY
ARISES: 1 - ABLE, USES ARMS TO HELP
BALANCE SCORE: 11

## 2025-06-26 PROCEDURE — 1090000002 HH PPS REVENUE DEBIT

## 2025-06-26 PROCEDURE — 1090000001 HH PPS REVENUE CREDIT

## 2025-06-27 ENCOUNTER — HOME CARE VISIT (OUTPATIENT)
Dept: HOME HEALTH SERVICES | Facility: HOME HEALTH | Age: OVER 89
End: 2025-06-27
Payer: MEDICARE

## 2025-06-27 PROCEDURE — 1090000001 HH PPS REVENUE CREDIT

## 2025-06-27 PROCEDURE — 1090000002 HH PPS REVENUE DEBIT

## 2025-06-27 PROCEDURE — G0152 HHCP-SERV OF OT,EA 15 MIN: HCPCS | Mod: HHH

## 2025-06-27 ASSESSMENT — ACTIVITIES OF DAILY LIVING (ADL)
DRESSING_LB_CURRENT_FUNCTION: INDEPENDENT
DRESSING_UB_CURRENT_FUNCTION: INDEPENDENT
BATHING_CURRENT_FUNCTION: INDEPENDENT
BATHING ASSESSED: 1
TOILETING: INDEPENDENT
PREPARING MEALS: INDEPENDENT
GROOMING ASSESSED: 1
TOILETING: 1
GROOMING_CURRENT_FUNCTION: INDEPENDENT
PHYSICAL TRANSFERS ASSESSED: 1
CURRENT_FUNCTION: INDEPENDENT

## 2025-06-28 PROCEDURE — 1090000002 HH PPS REVENUE DEBIT

## 2025-06-28 PROCEDURE — 1090000001 HH PPS REVENUE CREDIT

## 2025-06-30 ENCOUNTER — HOME CARE VISIT (OUTPATIENT)
Dept: HOME HEALTH SERVICES | Facility: HOME HEALTH | Age: OVER 89
End: 2025-06-30
Payer: MEDICARE

## 2025-07-01 ENCOUNTER — HOME CARE VISIT (OUTPATIENT)
Dept: HOME HEALTH SERVICES | Facility: HOME HEALTH | Age: OVER 89
End: 2025-07-01
Payer: MEDICARE

## 2025-07-02 ENCOUNTER — DOCUMENTATION (OUTPATIENT)
Dept: HOME HEALTH SERVICES | Facility: HOME HEALTH | Age: OVER 89
End: 2025-07-02
Payer: MEDICARE

## 2025-07-02 NOTE — HH CARE COORDINATION
Home Care received a Referral to Resume Care for Nursing, Physical Therapy, Occupational Therapy, and Home Health Aide. We have processed the referral for a Resumption of Care on 7/3-7/4.     If you have any questions or concerns, please feel free to contact us at [phone]. Follow the prompts, enter your five digit zip code, and you will be directed to your care team on CENTL 1.

## 2025-07-05 ENCOUNTER — HOME CARE VISIT (OUTPATIENT)
Dept: HOME HEALTH SERVICES | Facility: HOME HEALTH | Age: OVER 89
End: 2025-07-05
Payer: MEDICARE

## 2025-07-05 VITALS
HEART RATE: 75 BPM | OXYGEN SATURATION: 97 % | DIASTOLIC BLOOD PRESSURE: 58 MMHG | RESPIRATION RATE: 18 BRPM | SYSTOLIC BLOOD PRESSURE: 96 MMHG | TEMPERATURE: 97.8 F

## 2025-07-05 PROCEDURE — G0299 HHS/HOSPICE OF RN EA 15 MIN: HCPCS | Mod: HHH

## 2025-07-05 ASSESSMENT — ENCOUNTER SYMPTOMS
APPETITE LEVEL: FAIR
CHANGE IN APPETITE: UNCHANGED
DENIES PAIN: 1
DRY SKIN: 1
MUSCLE WEAKNESS: 1
PERSON REPORTING PAIN: PATIENT

## 2025-07-05 ASSESSMENT — ACTIVITIES OF DAILY LIVING (ADL): ENTERING_EXITING_HOME: MODERATE ASSIST

## 2025-07-06 ASSESSMENT — ACTIVITIES OF DAILY LIVING (ADL): OASIS_M1830: 01

## 2025-07-07 ENCOUNTER — HOME CARE VISIT (OUTPATIENT)
Dept: HOME HEALTH SERVICES | Facility: HOME HEALTH | Age: OVER 89
End: 2025-07-07
Payer: MEDICARE

## 2025-07-07 VITALS
RESPIRATION RATE: 16 BRPM | HEART RATE: 72 BPM | TEMPERATURE: 97.9 F | DIASTOLIC BLOOD PRESSURE: 62 MMHG | SYSTOLIC BLOOD PRESSURE: 104 MMHG

## 2025-07-07 PROCEDURE — G0151 HHCP-SERV OF PT,EA 15 MIN: HCPCS | Mod: HHH

## 2025-07-07 ASSESSMENT — BALANCE ASSESSMENTS
STANDING BALANCE: 2 - NARROW STANCE WITHOUT SUPPORT
ARISING SCORE: 1
ATTEMPTS TO ARISE: 2 - ABLE TO RISE, ONE ATTEMPT
BALANCE SCORE: 11
ARISES: 1 - ABLE, USES ARMS TO HELP
TURNING 360 DEGREES STEPS: 0 - DISCONTINUOUS STEPS
SITTING BALANCE: 1 - STEADY, SAFE
IMMEDIATE STANDING BALANCE FIRST 5 SECONDS: 1 - STEADY BUT USES WALKER OR OTHER SUPPORT
SITTING DOWN: 1 - USES ARMS OR NOT SMOOTH MOTION
EYES CLOSED AT MAXIMUM POSITION NUDGED: 1 - STEADY
NUDGED: 2 - STEADY
NUDGED SCORE: 2

## 2025-07-07 ASSESSMENT — ACTIVITIES OF DAILY LIVING (ADL): AMBULATION ASSISTANCE ON FLAT SURFACES: 1

## 2025-07-07 ASSESSMENT — GAIT ASSESSMENTS
INITIATION OF GAIT IMMEDIATELY AFTER GO: 0 - ANY HESITANCY OR MULTIPLE ATTEMPTS TO START
STEP CONTINUITY: 0 - STOPPING OR DISCONTINUITY BETWEEN STEPS
BALANCE AND GAIT SCORE: 18
STEP SYMMETRY: 1 - RIGHT AND LEFT STEP LENGTH APPEAR EQUAL
TRUNK: 1 - NO SWAY BUT FLEXION OF KNEES OR BACK OR SPREADS ARMS WHILE WALKING
WALKING STANCE: 0 - HEELS APART
TRUNK SCORE: 1
GAIT SCORE: 7
PATH SCORE: 1
PATH: 1 - MILD/MODERATE DEVIATION OR USES WALKING AID

## 2025-07-08 ENCOUNTER — HOME CARE VISIT (OUTPATIENT)
Dept: HOME HEALTH SERVICES | Facility: HOME HEALTH | Age: OVER 89
End: 2025-07-08
Payer: MEDICARE

## 2025-07-09 ENCOUNTER — HOME CARE VISIT (OUTPATIENT)
Dept: HOME HEALTH SERVICES | Facility: HOME HEALTH | Age: OVER 89
End: 2025-07-09
Payer: MEDICARE

## 2025-07-09 ENCOUNTER — APPOINTMENT (OUTPATIENT)
Dept: NEPHROLOGY | Facility: CLINIC | Age: OVER 89
End: 2025-07-09
Payer: MEDICARE

## 2025-07-09 VITALS
SYSTOLIC BLOOD PRESSURE: 98 MMHG | OXYGEN SATURATION: 98 % | DIASTOLIC BLOOD PRESSURE: 60 MMHG | TEMPERATURE: 97.4 F | RESPIRATION RATE: 18 BRPM | HEART RATE: 68 BPM

## 2025-07-09 VITALS
TEMPERATURE: 97.9 F | HEART RATE: 74 BPM | DIASTOLIC BLOOD PRESSURE: 54 MMHG | SYSTOLIC BLOOD PRESSURE: 102 MMHG | OXYGEN SATURATION: 96 %

## 2025-07-09 PROCEDURE — G0300 HHS/HOSPICE OF LPN EA 15 MIN: HCPCS | Mod: HHH

## 2025-07-09 PROCEDURE — G0157 HHC PT ASSISTANT EA 15: HCPCS | Mod: CQ,HHH

## 2025-07-09 ASSESSMENT — PAIN SCALES - PAIN ASSESSMENT IN ADVANCED DEMENTIA (PAINAD)
BREATHING: 0
CONSOLABILITY: 0 - NO NEED TO CONSOLE.
CONSOLABILITY: 0
BODYLANGUAGE: 0 - RELAXED.
NEGVOCALIZATION: 0 - NONE.
BODYLANGUAGE: 0
FACIALEXPRESSION: 0
NEGVOCALIZATION: 0
TOTALSCORE: 0
FACIALEXPRESSION: 0 - SMILING OR INEXPRESSIVE.

## 2025-07-09 ASSESSMENT — ENCOUNTER SYMPTOMS
PERSON REPORTING PAIN: PATIENT
DENIES PAIN: 1
DENIES PAIN: 1

## 2025-07-14 ENCOUNTER — HOME CARE VISIT (OUTPATIENT)
Dept: HOME HEALTH SERVICES | Facility: HOME HEALTH | Age: OVER 89
End: 2025-07-14
Payer: MEDICARE

## 2025-07-14 VITALS
HEART RATE: 77 BPM | SYSTOLIC BLOOD PRESSURE: 110 MMHG | TEMPERATURE: 97.4 F | DIASTOLIC BLOOD PRESSURE: 58 MMHG | OXYGEN SATURATION: 96 %

## 2025-07-14 PROCEDURE — G0157 HHC PT ASSISTANT EA 15: HCPCS | Mod: CQ,HHH

## 2025-07-16 ENCOUNTER — HOME CARE VISIT (OUTPATIENT)
Dept: HOME HEALTH SERVICES | Facility: HOME HEALTH | Age: OVER 89
End: 2025-07-16
Payer: MEDICARE

## 2025-07-16 VITALS
OXYGEN SATURATION: 96 % | HEART RATE: 66 BPM | RESPIRATION RATE: 16 BRPM | DIASTOLIC BLOOD PRESSURE: 64 MMHG | TEMPERATURE: 97.8 F | SYSTOLIC BLOOD PRESSURE: 108 MMHG

## 2025-07-16 PROCEDURE — G0299 HHS/HOSPICE OF RN EA 15 MIN: HCPCS | Mod: HHH

## 2025-07-16 PROCEDURE — G0157 HHC PT ASSISTANT EA 15: HCPCS | Mod: CQ,HHH

## 2025-07-16 ASSESSMENT — BALANCE ASSESSMENTS
ATTEMPTS TO ARISE: 2 - ABLE TO RISE, ONE ATTEMPT
ARISING SCORE: 2
EYES CLOSED AT MAXIMUM POSITION NUDGED: 0 - UNSTEADY
NUDGED: 1 - STAGGERS, GRABS, CATCHES SELF
NUDGED SCORE: 1
BALANCE SCORE: 14
SITTING DOWN: 2 - SAFE, SMOOTH MOTION
TURNING 360 DEGREES STEPS: 1 - CONTINUOUS STEPS
SITTING BALANCE: 1 - STEADY, SAFE
STANDING BALANCE: 2 - NARROW STANCE WITHOUT SUPPORT
IMMEDIATE STANDING BALANCE FIRST 5 SECONDS: 2 - STEADY WITHOUT WALKER OR OTHER SUPPORT
ARISES: 2 - ABLE WITHOUT USING ARMS

## 2025-07-16 ASSESSMENT — GAIT ASSESSMENTS
WALKING STANCE: 1 - HEELS ALMOST TOUCHING WHILE WALKING
INITIATION OF GAIT IMMEDIATELY AFTER GO: 1 - NO HESITANCY
STEP CONTINUITY: 1 - STEPS APPEAR CONTINUOUS
TRUNK SCORE: 2
PATH SCORE: 2
STEP SYMMETRY: 1 - RIGHT AND LEFT STEP LENGTH APPEAR EQUAL
PATH: 2 - STRAIGHT WITHOUT WALKING AID
GAIT SCORE: 12
TRUNK: 2 - NO SWAY, NO FLEXION, NO USE OF ARMS, NO WALKING AID
BALANCE AND GAIT SCORE: 26

## 2025-07-16 ASSESSMENT — ENCOUNTER SYMPTOMS
DENIES PAIN: 1
DENIES PAIN: 1
PERSON REPORTING PAIN: PATIENT
APPETITE LEVEL: FAIR
PERSON REPORTING PAIN: PATIENT
MUSCLE WEAKNESS: 1
CHANGE IN APPETITE: UNCHANGED

## 2025-07-21 ENCOUNTER — HOME CARE VISIT (OUTPATIENT)
Dept: HOME HEALTH SERVICES | Facility: HOME HEALTH | Age: OVER 89
End: 2025-07-21
Payer: MEDICARE

## 2025-07-21 VITALS
HEART RATE: 68 BPM | SYSTOLIC BLOOD PRESSURE: 94 MMHG | TEMPERATURE: 97.9 F | DIASTOLIC BLOOD PRESSURE: 60 MMHG | RESPIRATION RATE: 16 BRPM

## 2025-07-21 PROCEDURE — G0151 HHCP-SERV OF PT,EA 15 MIN: HCPCS | Mod: HHH

## 2025-07-21 ASSESSMENT — BALANCE ASSESSMENTS
NUDGED: 2 - STEADY
NUDGED SCORE: 2
ARISES: 1 - ABLE, USES ARMS TO HELP
SITTING BALANCE: 1 - STEADY, SAFE
EYES CLOSED AT MAXIMUM POSITION NUDGED: 1 - STEADY
IMMEDIATE STANDING BALANCE FIRST 5 SECONDS: 2 - STEADY WITHOUT WALKER OR OTHER SUPPORT
SITTING DOWN: 2 - SAFE, SMOOTH MOTION
STANDING BALANCE: 2 - NARROW STANCE WITHOUT SUPPORT
TURNING 360 DEGREES STEPS: 1 - CONTINUOUS STEPS
BALANCE SCORE: 15
ARISING SCORE: 1
ATTEMPTS TO ARISE: 2 - ABLE TO RISE, ONE ATTEMPT

## 2025-07-21 ASSESSMENT — GAIT ASSESSMENTS
PATH: 2 - STRAIGHT WITHOUT WALKING AID
GAIT SCORE: 12
PATH SCORE: 2
TRUNK: 2 - NO SWAY, NO FLEXION, NO USE OF ARMS, NO WALKING AID
TRUNK SCORE: 2
STEP SYMMETRY: 1 - RIGHT AND LEFT STEP LENGTH APPEAR EQUAL
WALKING STANCE: 1 - HEELS ALMOST TOUCHING WHILE WALKING
STEP CONTINUITY: 1 - STEPS APPEAR CONTINUOUS
BALANCE AND GAIT SCORE: 27
INITIATION OF GAIT IMMEDIATELY AFTER GO: 1 - NO HESITANCY

## 2025-07-21 ASSESSMENT — ENCOUNTER SYMPTOMS
PERSON REPORTING PAIN: PATIENT
DENIES PAIN: 1

## 2025-07-21 ASSESSMENT — ACTIVITIES OF DAILY LIVING (ADL)
OASIS_M1830: 01
HOME_HEALTH_OASIS: 00

## 2025-07-29 ENCOUNTER — APPOINTMENT (OUTPATIENT)
Dept: CARDIOLOGY | Facility: CLINIC | Age: OVER 89
End: 2025-07-29
Payer: MEDICARE

## 2025-08-04 ENCOUNTER — HOSPITAL ENCOUNTER (OUTPATIENT)
Dept: CARDIOLOGY | Facility: CLINIC | Age: OVER 89
Discharge: HOME | End: 2025-08-04
Payer: MEDICARE

## 2025-08-04 DIAGNOSIS — Z95.0 PACEMAKER: ICD-10-CM

## 2025-08-04 DIAGNOSIS — I25.10 CORONARY ARTERY DISEASE INVOLVING NATIVE CORONARY ARTERY OF NATIVE HEART WITHOUT ANGINA PECTORIS: ICD-10-CM

## 2025-08-04 DIAGNOSIS — I21.4 NON-ST ELEVATION MYOCARDIAL INFARCTION (NSTEMI), SUBSEQUENT EPISODE OF CARE (MULTI): ICD-10-CM

## 2025-08-04 PROCEDURE — 93296 REM INTERROG EVL PM/IDS: CPT

## 2025-08-26 ENCOUNTER — OFFICE VISIT (OUTPATIENT)
Dept: CARDIOLOGY | Facility: CLINIC | Age: OVER 89
End: 2025-08-26
Payer: MEDICARE

## 2025-08-26 VITALS
SYSTOLIC BLOOD PRESSURE: 156 MMHG | OXYGEN SATURATION: 95 % | WEIGHT: 90 LBS | RESPIRATION RATE: 17 BRPM | HEIGHT: 60 IN | BODY MASS INDEX: 17.67 KG/M2 | HEART RATE: 71 BPM | DIASTOLIC BLOOD PRESSURE: 81 MMHG

## 2025-08-26 DIAGNOSIS — I10 BENIGN ESSENTIAL HTN: ICD-10-CM

## 2025-08-26 DIAGNOSIS — I50.22 CHRONIC SYSTOLIC CONGESTIVE HEART FAILURE: Primary | ICD-10-CM

## 2025-08-26 DIAGNOSIS — I25.10 3-VESSEL CORONARY ARTERY DISEASE: ICD-10-CM

## 2025-08-26 LAB
ATRIAL RATE: 71 BPM
PR INTERVAL: 164 MS
Q ONSET: 206 MS
QRS COUNT: 12 BEATS
QRS DURATION: 118 MS
QT INTERVAL: 408 MS
QTC CALCULATION(BAZETT): 443 MS
QTC FREDERICIA: 431 MS
R AXIS: 13 DEGREES
T AXIS: -82 DEGREES
T OFFSET: 410 MS
VENTRICULAR RATE: 71 BPM

## 2025-08-26 PROCEDURE — 99212 OFFICE O/P EST SF 10 MIN: CPT

## 2025-08-26 PROCEDURE — 93005 ELECTROCARDIOGRAM TRACING: CPT | Performed by: INTERNAL MEDICINE

## 2025-08-26 ASSESSMENT — ENCOUNTER SYMPTOMS
DEPRESSION: 0
OCCASIONAL FEELINGS OF UNSTEADINESS: 0

## 2025-08-26 ASSESSMENT — COLUMBIA-SUICIDE SEVERITY RATING SCALE - C-SSRS
6. HAVE YOU EVER DONE ANYTHING, STARTED TO DO ANYTHING, OR PREPARED TO DO ANYTHING TO END YOUR LIFE?: NO
2. HAVE YOU ACTUALLY HAD ANY THOUGHTS OF KILLING YOURSELF?: NO
1. IN THE PAST MONTH, HAVE YOU WISHED YOU WERE DEAD OR WISHED YOU COULD GO TO SLEEP AND NOT WAKE UP?: NO

## 2025-08-26 ASSESSMENT — PAIN SCALES - GENERAL
PAINLEVEL_OUTOF10: 0-NO PAIN
PAINLEVEL_OUTOF10: 0-NO PAIN

## 2025-08-27 LAB
ALBUMIN SERPL-MCNC: 4.2 G/DL (ref 3.6–5.1)
BNP SERPL-MCNC: 368 PG/ML
BUN SERPL-MCNC: 52 MG/DL (ref 7–25)
BUN/CREAT SERPL: 24 (CALC) (ref 6–22)
CALCIUM SERPL-MCNC: 8.9 MG/DL (ref 8.6–10.4)
CHLORIDE SERPL-SCNC: 108 MMOL/L (ref 98–110)
CO2 SERPL-SCNC: 22 MMOL/L (ref 20–32)
CREAT SERPL-MCNC: 2.18 MG/DL (ref 0.6–0.95)
EGFRCR SERPLBLD CKD-EPI 2021: 21 ML/MIN/1.73M2
ERYTHROCYTE [DISTWIDTH] IN BLOOD BY AUTOMATED COUNT: 19.8 % (ref 11–15)
GLUCOSE SERPL-MCNC: 88 MG/DL (ref 65–99)
HCT VFR BLD AUTO: 37.6 % (ref 35–45)
HGB BLD-MCNC: 11.4 G/DL (ref 11.7–15.5)
MAGNESIUM SERPL-MCNC: 2.2 MG/DL (ref 1.5–2.5)
MCH RBC QN AUTO: 25.1 PG (ref 27–33)
MCHC RBC AUTO-ENTMCNC: 30.3 G/DL (ref 32–36)
MCV RBC AUTO: 82.8 FL (ref 80–100)
PHOSPHATE SERPL-MCNC: 5 MG/DL (ref 2.1–4.3)
PLATELET # BLD AUTO: 211 THOUSAND/UL (ref 140–400)
PMV BLD REES-ECKER: 11.4 FL (ref 7.5–12.5)
POTASSIUM SERPL-SCNC: 5.2 MMOL/L (ref 3.5–5.3)
RBC # BLD AUTO: 4.54 MILLION/UL (ref 3.8–5.1)
SODIUM SERPL-SCNC: 137 MMOL/L (ref 135–146)
WBC # BLD AUTO: 6.8 THOUSAND/UL (ref 3.8–10.8)

## 2025-09-02 ENCOUNTER — TELEPHONE (OUTPATIENT)
Dept: CARDIOLOGY | Facility: CLINIC | Age: OVER 89
End: 2025-09-02
Payer: MEDICARE

## (undated) DEVICE — KIT, HIP FRACTURES AND SCOPES, CUSTOM, AHUJA

## (undated) DEVICE — ADHESIVE, SKIN, LIQUIBAND EXCEED

## (undated) DEVICE — Device

## (undated) DEVICE — SOLUTION, IRRIGATION, SODIUM CHLORIDE 0.9%, 1000 ML, POUR BOTTLE

## (undated) DEVICE — GLOVE, SURGICAL, PROTEXIS PI MICRO, 8.0, PF, LF

## (undated) DEVICE — GLOVE, SURGICAL, PROTEXIS PI ORTHO, 8.0, PF, LF

## (undated) DEVICE — WIRE, KIRSCHNER 3.2 X 450

## (undated) DEVICE — TIP, SUCTION, YANKAUER, BULB, ADULT

## (undated) DEVICE — GLOVE, SURGICAL, PROTEXIS PI BLUE W/NEUTHERA, 8.0, PF, LF

## (undated) DEVICE — COVER, MAYO STAND, W/PAD, 23 IN, DISPOSABLE, PLASTIC, LF, STERILE

## (undated) DEVICE — DRESSING, GAUZE, SPONGE, KERLIX, SUPER, 6 X 6.75 IN, STERILE 10PK

## (undated) DEVICE — DRAPE, INCISE, ANTIMICROBIAL, IOBAN 2, LARGE, 17 X 23 IN, DISPOSABLE, STERILE

## (undated) DEVICE — TOWEL, SURGICAL, NEURO, O/R, 16 X 26, BLUE, STERILE

## (undated) DEVICE — COVER, C-ARM W/CLIPS, OEC GE

## (undated) DEVICE — DRILL BIT, 4.2 X 340MM

## (undated) DEVICE — SUTURE, VICRYL, 1, 27 IN, CT-1, VIOLET

## (undated) DEVICE — SUTURE, MONOCRYL, 4-0, 18 IN, PS2, UNDYED

## (undated) DEVICE — SUTURE, VICRYL, 2-0, 27 IN, FSL, UNDYED

## (undated) DEVICE — SPONGE, LAP, XRAY DECT, 18IN X 18IN, W/MASTER DMT, STERILE